# Patient Record
Sex: FEMALE | Race: WHITE | NOT HISPANIC OR LATINO | Employment: FULL TIME | URBAN - NONMETROPOLITAN AREA
[De-identification: names, ages, dates, MRNs, and addresses within clinical notes are randomized per-mention and may not be internally consistent; named-entity substitution may affect disease eponyms.]

---

## 2019-09-12 ENCOUNTER — OFFICE VISIT (OUTPATIENT)
Dept: FAMILY MEDICINE CLINIC | Facility: CLINIC | Age: 24
End: 2019-09-12
Payer: COMMERCIAL

## 2019-09-12 VITALS
HEIGHT: 64 IN | WEIGHT: 173.8 LBS | BODY MASS INDEX: 29.67 KG/M2 | DIASTOLIC BLOOD PRESSURE: 82 MMHG | SYSTOLIC BLOOD PRESSURE: 120 MMHG

## 2019-09-12 DIAGNOSIS — F41.9 ANXIETY: Primary | ICD-10-CM

## 2019-09-12 DIAGNOSIS — Z13.31 DEPRESSION SCREENING NEGATIVE: ICD-10-CM

## 2019-09-12 PROBLEM — E78.5 HYPERLIPIDEMIA: Status: ACTIVE | Noted: 2019-09-12

## 2019-09-12 PROBLEM — F33.1 MODERATE EPISODE OF RECURRENT MAJOR DEPRESSIVE DISORDER (HCC): Status: ACTIVE | Noted: 2017-01-31

## 2019-09-12 PROBLEM — E55.9 VITAMIN D DEFICIENCY: Status: ACTIVE | Noted: 2019-09-12

## 2019-09-12 PROCEDURE — 99204 OFFICE O/P NEW MOD 45 MIN: CPT | Performed by: PHYSICIAN ASSISTANT

## 2019-09-12 RX ORDER — SERTRALINE HYDROCHLORIDE 100 MG/1
100 TABLET, FILM COATED ORAL DAILY
COMMUNITY
End: 2019-09-12 | Stop reason: SDUPTHER

## 2019-09-12 RX ORDER — NITROFURANTOIN 25; 75 MG/1; MG/1
100 CAPSULE ORAL AS NEEDED
COMMUNITY
End: 2019-10-15 | Stop reason: ALTCHOICE

## 2019-09-12 RX ORDER — SERTRALINE HYDROCHLORIDE 100 MG/1
100 TABLET, FILM COATED ORAL DAILY
Qty: 90 TABLET | Refills: 0 | Status: SHIPPED | OUTPATIENT
Start: 2019-09-12 | End: 2020-01-07 | Stop reason: SDUPTHER

## 2019-09-12 RX ORDER — PROPRANOLOL HYDROCHLORIDE 10 MG/1
10 TABLET ORAL DAILY PRN
COMMUNITY
End: 2019-09-12 | Stop reason: SDUPTHER

## 2019-09-12 RX ORDER — PROPRANOLOL HYDROCHLORIDE 10 MG/1
TABLET ORAL
Qty: 90 TABLET | Refills: 0 | Status: SHIPPED | OUTPATIENT
Start: 2019-09-12 | End: 2020-08-31

## 2019-09-12 NOTE — PROGRESS NOTES
Assessment/Plan:    Problem List Items Addressed This Visit        Other    Anxiety - Primary    Relevant Medications    propranolol (INDERAL) 10 mg tablet    sertraline (ZOLOFT) 100 mg tablet      Other Visit Diagnoses     Depression screening negative        BMI 29 0-29 9,adult               Diagnoses and all orders for this visit:    Anxiety  -     propranolol (INDERAL) 10 mg tablet; Take 10 mg by mouth daily PRN  -     sertraline (ZOLOFT) 100 mg tablet; Take 1 tablet (100 mg total) by mouth daily    Depression screening negative    BMI 29 0-29 9,adult    Other orders  -     Discontinue: sertraline (ZOLOFT) 100 mg tablet; Take 100 mg by mouth daily  -     Norethin-Eth Estrad-Fe Biphas (LO LOESTRIN FE PO); Take by mouth  -     Discontinue: propranolol (INDERAL) 10 mg tablet; Take 10 mg by mouth daily as needed  -     nitrofurantoin (MACROBID) 100 mg capsule; Take 100 mg by mouth as needed              Subjective:      Patient ID: Solo Valladares is a 30 y o  female  Kavya is a new patient to our office  She is here to get established and to have medication refilled  She is doing well and does not have any new complaints/problems  The following portions of the patient's history were reviewed and updated as appropriate:   She has no past medical history on file  ,  does not have any pertinent problems on file  ,   has a past surgical history that includes Appendectomy  ,  family history includes No Known Problems in her mother  ,   reports that she has never smoked  She has never used smokeless tobacco  She reports that she drinks alcohol  Her drug history is not on file  ,  is allergic to lac bovis and pollen extract     Current Outpatient Medications   Medication Sig Dispense Refill    nitrofurantoin (MACROBID) 100 mg capsule Take 100 mg by mouth as needed      Norethin-Eth Estrad-Fe Biphas (LO LOESTRIN FE PO) Take by mouth      propranolol (INDERAL) 10 mg tablet Take 10 mg by mouth daily PRN 90 tablet 0    sertraline (ZOLOFT) 100 mg tablet Take 1 tablet (100 mg total) by mouth daily 90 tablet 0     No current facility-administered medications for this visit  Review of Systems   Constitutional: Negative for activity change, appetite change, chills, diaphoresis, fatigue, fever and unexpected weight change  HENT: Negative for congestion, ear pain, postnasal drip, rhinorrhea, sinus pressure, sinus pain, sneezing, sore throat, tinnitus and voice change  Eyes: Negative for pain, redness and visual disturbance  Respiratory: Negative for cough, chest tightness, shortness of breath and wheezing  Cardiovascular: Negative for chest pain, palpitations and leg swelling  Gastrointestinal: Negative for abdominal pain, blood in stool, constipation, diarrhea, nausea and vomiting  Genitourinary: Negative for difficulty urinating, dysuria, frequency, hematuria and urgency  Musculoskeletal: Negative for arthralgias, back pain, gait problem, joint swelling, myalgias, neck pain and neck stiffness  Skin: Negative for color change, pallor, rash and wound  Neurological: Negative for dizziness, tremors, weakness, light-headedness and headaches  Psychiatric/Behavioral: Negative for dysphoric mood, self-injury, sleep disturbance and suicidal ideas  The patient is not nervous/anxious  Objective:  Vitals:    09/12/19 1259   BP: 120/82   BP Location: Left arm   Patient Position: Sitting   Weight: 78 8 kg (173 lb 12 8 oz)   Height: 5' 4" (1 626 m)     Body mass index is 29 83 kg/m²  Physical Exam   Constitutional: She is oriented to person, place, and time  She appears well-developed and well-nourished  No distress  HENT:   Head: Normocephalic and atraumatic     Right Ear: Hearing, tympanic membrane, external ear and ear canal normal    Left Ear: Hearing, tympanic membrane, external ear and ear canal normal    Mouth/Throat: Uvula is midline, oropharynx is clear and moist and mucous membranes are normal  No oropharyngeal exudate  Eyes: Conjunctivae are normal  Right eye exhibits no discharge  Left eye exhibits no discharge  No scleral icterus  Neck: Neck supple  Carotid bruit is not present  No thyromegaly present  Cardiovascular: Normal rate, regular rhythm and normal heart sounds  No murmur heard  Pulmonary/Chest: Effort normal and breath sounds normal  No respiratory distress  She has no wheezes  Abdominal: Soft  Bowel sounds are normal  She exhibits no distension and no mass  There is no tenderness  There is no rebound and no guarding  Musculoskeletal: Normal range of motion  She exhibits no edema or tenderness  Lymphadenopathy:     She has no cervical adenopathy  Neurological: She is alert and oriented to person, place, and time  Skin: Skin is warm and dry  No rash noted  She is not diaphoretic  No erythema  Psychiatric: She has a normal mood and affect  Her behavior is normal  Judgment and thought content normal    Vitals reviewed  PHQ-9 Depression Screening    PHQ-9:    Frequency of the following problems over the past two weeks:       Little interest or pleasure in doing things:  1 - several days  Feeling down, depressed, or hopeless:  1 - several days  PHQ-2 Score:  2       BMI Counseling: Body mass index is 29 83 kg/m²  The BMI is above normal  Nutrition recommendations include reducing portion sizes  Exercise recommendations include exercising 3-5 times per week

## 2019-10-08 ENCOUNTER — TELEPHONE (OUTPATIENT)
Dept: FAMILY MEDICINE CLINIC | Facility: CLINIC | Age: 24
End: 2019-10-08

## 2019-10-08 DIAGNOSIS — Z12.4 SCREENING FOR CERVICAL CANCER: Primary | ICD-10-CM

## 2019-10-08 NOTE — TELEPHONE ENCOUNTER
Called requesting a referral for OBGyn for Yearly visit - Referral was put in    Pt has [employer] & will call back for ins referral when appt is set up and speak to someone at  about putting in referral    Pt will also be working on getting an eye appt for as well

## 2019-10-15 ENCOUNTER — OFFICE VISIT (OUTPATIENT)
Dept: FAMILY MEDICINE CLINIC | Facility: CLINIC | Age: 24
End: 2019-10-15
Payer: COMMERCIAL

## 2019-10-15 VITALS
TEMPERATURE: 97.8 F | BODY MASS INDEX: 30.05 KG/M2 | SYSTOLIC BLOOD PRESSURE: 112 MMHG | DIASTOLIC BLOOD PRESSURE: 70 MMHG | WEIGHT: 176 LBS | HEIGHT: 64 IN

## 2019-10-15 DIAGNOSIS — M54.50 ACUTE RIGHT-SIDED LOW BACK PAIN WITHOUT SCIATICA: ICD-10-CM

## 2019-10-15 DIAGNOSIS — R10.9 RIGHT FLANK PAIN: Primary | ICD-10-CM

## 2019-10-15 LAB
SL AMB  POCT GLUCOSE, UA: NORMAL
SL AMB LEUKOCYTE ESTERASE,UA: NORMAL
SL AMB POCT BILIRUBIN,UA: NORMAL
SL AMB POCT BLOOD,UA: NORMAL
SL AMB POCT CLARITY,UA: CLEAR
SL AMB POCT COLOR,UA: YELLOW
SL AMB POCT KETONES,UA: NORMAL
SL AMB POCT NITRITE,UA: NORMAL
SL AMB POCT PH,UA: 6
SL AMB POCT SPECIFIC GRAVITY,UA: 1.03
SL AMB POCT URINE PROTEIN: NORMAL
SL AMB POCT UROBILINOGEN: 0.2

## 2019-10-15 PROCEDURE — 3008F BODY MASS INDEX DOCD: CPT | Performed by: PHYSICIAN ASSISTANT

## 2019-10-15 PROCEDURE — 87086 URINE CULTURE/COLONY COUNT: CPT | Performed by: PHYSICIAN ASSISTANT

## 2019-10-15 PROCEDURE — 87186 SC STD MICRODIL/AGAR DIL: CPT | Performed by: PHYSICIAN ASSISTANT

## 2019-10-15 PROCEDURE — 81002 URINALYSIS NONAUTO W/O SCOPE: CPT | Performed by: PHYSICIAN ASSISTANT

## 2019-10-15 PROCEDURE — 99213 OFFICE O/P EST LOW 20 MIN: CPT | Performed by: PHYSICIAN ASSISTANT

## 2019-10-15 RX ORDER — CIPROFLOXACIN 500 MG/1
500 TABLET, FILM COATED ORAL EVERY 12 HOURS SCHEDULED
Qty: 14 TABLET | Refills: 0 | Status: SHIPPED | OUTPATIENT
Start: 2019-10-15 | End: 2019-10-22

## 2019-10-18 LAB
BACTERIA UR CULT: ABNORMAL
BACTERIA UR CULT: ABNORMAL

## 2020-01-07 DIAGNOSIS — F41.9 ANXIETY: ICD-10-CM

## 2020-01-07 RX ORDER — SERTRALINE HYDROCHLORIDE 100 MG/1
100 TABLET, FILM COATED ORAL DAILY
Qty: 90 TABLET | Refills: 0 | Status: SHIPPED | OUTPATIENT
Start: 2020-01-07 | End: 2020-03-31

## 2020-01-07 RX ORDER — SERTRALINE HYDROCHLORIDE 100 MG/1
100 TABLET, FILM COATED ORAL DAILY
Qty: 90 TABLET | Refills: 0 | Status: CANCELLED | OUTPATIENT
Start: 2020-01-07

## 2020-03-10 ENCOUNTER — OFFICE VISIT (OUTPATIENT)
Dept: URGENT CARE | Facility: CLINIC | Age: 25
End: 2020-03-10
Payer: COMMERCIAL

## 2020-03-10 VITALS
SYSTOLIC BLOOD PRESSURE: 123 MMHG | RESPIRATION RATE: 20 BRPM | DIASTOLIC BLOOD PRESSURE: 79 MMHG | HEIGHT: 64 IN | OXYGEN SATURATION: 98 % | HEART RATE: 74 BPM | BODY MASS INDEX: 30.56 KG/M2 | WEIGHT: 179 LBS | TEMPERATURE: 98.6 F

## 2020-03-10 DIAGNOSIS — A09 TRAVELER'S DIARRHEA: Primary | ICD-10-CM

## 2020-03-10 PROCEDURE — G0382 LEV 3 HOSP TYPE B ED VISIT: HCPCS | Performed by: PHYSICIAN ASSISTANT

## 2020-03-10 RX ORDER — CIPROFLOXACIN 500 MG/1
500 TABLET, FILM COATED ORAL EVERY 12 HOURS SCHEDULED
Qty: 6 TABLET | Refills: 0 | Status: SHIPPED | OUTPATIENT
Start: 2020-03-10 | End: 2020-03-13

## 2020-03-10 NOTE — PROGRESS NOTES
[facility] [address] [facility]  ([facility] [phone]  (fax) [phone]        NAME: Katiana Sanchez is a 30 y o  female  : 1995    MRN: [id number]  DATE: March 10, 2020  TIME: 4:11 PM    Assessment and Plan   Traveler's diarrhea [A09]  1  Traveler's diarrhea  ciprofloxacin (CIPRO) 500 mg tablet       Patient Instructions   Cipro as prescribed  The antibiotic you have been prescribed can affect the tendons  Patient warned of the risk of tendon rupture due to the antibiotic  Patient is to limit physical activity for 3-4 weeks after finishing the antibiotic  I have prescribed an antibiotic for the infection  Please take the antibiotic as prescribed and finish the entire prescription  I recommend that the patient takes an over the counter probiotic or eats yogurt with live cultures in it [place]) to keep good bacteria in the gut and help prevent diarrhea  If not improving over the next 3-5 days, follow up with PCP  Patient did verbalize understanding  To present to the ER if symptoms worsen  Chief Complaint     Chief Complaint   Patient presents with    Diarrhea     x 3 days Just got home from [address] [place] [address] ,[address] Unit Like Symptoms    Sore Throat         History of Present Illness   Katiana Sanchez presents to the clinic c/o  NO fevers or SOB  Diarrhea    This is a new problem  The current episode started in the past 7 days  The problem occurs 2 to 4 times per day  The problem has been unchanged  The stool consistency is described as watery  Associated symptoms include bloating  Pertinent negatives include no abdominal pain, arthralgias, chills, coughing, fever, headaches, increased  flatus, myalgias or vomiting  Nothing aggravates the symptoms  Risk factors include travel to endemic area ([facility])  Treatments tried: peptobismol  The treatment provided no relief         Review of Systems   Review of Systems   Constitutional: Negative for activity change, appetite change, chills, diaphoresis, fatigue and fever  HENT: Positive for sore throat  Negative for congestion, ear discharge, ear pain, facial swelling, rhinorrhea, sinus pressure, sinus pain and sneezing  Eyes: Negative for photophobia, pain, discharge, redness, itching and visual disturbance  Respiratory: Negative for apnea, cough, chest tightness, shortness of breath and wheezing  Cardiovascular: Negative for chest pain  Gastrointestinal: Positive for bloating and diarrhea  Negative for abdominal distention, abdominal pain, constipation, flatus, nausea and vomiting  Genitourinary: Negative for dysuria, flank pain, frequency, hematuria and urgency  Musculoskeletal: Negative for arthralgias, back pain, gait problem, joint swelling, myalgias, neck pain and neck stiffness  Skin: Negative for color change, rash and wound  Allergic/Immunologic: Negative for immunocompromised state  Neurological: Negative for dizziness and headaches  Hematological: Negative for adenopathy  Psychiatric/Behavioral: Negative for confusion  Current Medications     Long-Term Medications   Medication Sig Dispense Refill    propranolol (INDERAL) 10 mg tablet Take 10 mg by mouth daily PRN 90 tablet 0    sertraline (ZOLOFT) 100 mg tablet Take 1 tablet (100 mg total) by mouth daily 90 tablet 0       Current Allergies     Allergies as of 03/10/2020 - Reviewed 03/10/2020   Allergen Reaction Noted    Lac bovis GI Intolerance 11/04/2015    Pollen extract Other (See Comments) 06/01/2016            The following portions of the patient's history were reviewed and updated as appropriate: allergies, current medications, past family history, past medical history, past social history, past surgical history and problem list   History reviewed  No pertinent past medical history    Past Surgical History:   Procedure Laterality Date    APPENDECTOMY       Social History     Socioeconomic History    Marital status: /Civil Union     Spouse name: Not on file    Number of children: Not on file    Years of education: Not on file    Highest education level: Not on file   Occupational History    Not on file   Social Needs    Financial resource strain: Not on file    Food insecurity:     Worry: Not on file     Inability: Not on file    Transportation needs:     Medical: Not on file     Non-medical: Not on file   Tobacco Use    Smoking status: Never Smoker    Smokeless tobacco: Never Used   Substance and Sexual Activity    Alcohol use: Yes     Frequency: Monthly or less    Drug use: Never    Sexual activity: Not on file   Lifestyle    Physical activity:     Days per week: Not on file     Minutes per session: Not on file    Stress: Not on file   Relationships    Social connections:     Talks on phone: Not on file     Gets together: Not on file     Attends Catholic service: Not on file     Active member of club or organization: Not on file     Attends meetings of clubs or organizations: Not on file     Relationship status: Not on file    Intimate partner violence:     Fear of current or ex partner: Not on file     Emotionally abused: Not on file     Physically abused: Not on file     Forced sexual activity: Not on file   Other Topics Concern    Not on file   Social History Narrative    Not on file       Objective   /79 (BP Location: Right arm, Patient Position: Sitting, Cuff Size: Standard)   Pulse 74   Temp 98 6 °F (37 °C) (Tympanic)   Resp 20   Ht 5' 4" (1 626 m)   Wt 81 2 kg (179 lb)   LMP 02/18/2020 (Approximate)   SpO2 98%   BMI 30 73 kg/m²      Physical Exam     Physical Exam   Constitutional: She is oriented to person, place, and time  She appears well-developed and well-nourished  No distress  HENT:   Head: Normocephalic and atraumatic     Right Ear: Tympanic membrane and external ear normal    Left Ear: Tympanic membrane and external ear normal    Nose: Nose normal    Mouth/Throat: Oropharynx is clear and moist  No oropharyngeal exudate  Eyes: Pupils are equal, round, and reactive to light  Conjunctivae and EOM are normal  Right eye exhibits no discharge  Left eye exhibits no discharge  No scleral icterus  Neck: Normal range of motion  Neck supple  No JVD present  No tracheal deviation present  No thyromegaly present  Cardiovascular: Normal rate, regular rhythm and normal heart sounds  Exam reveals no gallop and no friction rub  No murmur heard  Pulmonary/Chest: Effort normal and breath sounds normal  No stridor  No respiratory distress  She has no decreased breath sounds  She has no wheezes  She has no rhonchi  She has no rales  She exhibits no tenderness  Abdominal: Soft  Normal appearance  Bowel sounds are increased  There is no hepatosplenomegaly  There is no tenderness  There is no rigidity, no rebound, no guarding, no CVA tenderness, no tenderness at McBurney's point and negative Ruiz's sign  Musculoskeletal: Normal range of motion  She exhibits no tenderness or deformity  Lymphadenopathy:     She has no cervical adenopathy  Neurological: She is alert and oriented to person, place, and time  Coordination normal    Skin: Skin is warm and dry  No rash noted  She is not diaphoretic  No erythema  No pallor  Psychiatric: She has a normal mood and affect  Her behavior is normal  Judgment and thought content normal    Nursing note and vitals reviewed        Marco Cullen PA-C

## 2020-03-18 ENCOUNTER — OFFICE VISIT (OUTPATIENT)
Dept: OBGYN CLINIC | Facility: MEDICAL CENTER | Age: 25
End: 2020-03-18
Payer: COMMERCIAL

## 2020-03-18 VITALS
SYSTOLIC BLOOD PRESSURE: 122 MMHG | DIASTOLIC BLOOD PRESSURE: 60 MMHG | HEIGHT: 64 IN | BODY MASS INDEX: 30.39 KG/M2 | WEIGHT: 178 LBS

## 2020-03-18 DIAGNOSIS — Z01.419 ENCNTR FOR GYN EXAM (GENERAL) (ROUTINE) W/O ABN FINDINGS: Primary | ICD-10-CM

## 2020-03-18 PROCEDURE — 99385 PREV VISIT NEW AGE 18-39: CPT | Performed by: NURSE PRACTITIONER

## 2020-03-18 NOTE — PROGRESS NOTES
ASSESSMENT & PLAN: Ethan Huston is a 30 y o  [place] with normal gynecologic exam     1   Routine well woman exam done today  2  Pap:  The patient's last pap was 2018  It was normal     Pap was  Not done today  Current ASCCP Guidelines reviewed  3   STD testing  was not done   4  Gardasil recommendations reviewed   5  The following were reviewed in today's visit: breast self exam, adequate intake of calcium and vitamin D, exercise, healthy diet and preconceptual counseling       Given  6  rto yearly gyn exam or with + upt    CC:  Annual Gynecologic Examination    HPI: Ethan Huston is a 30 y o  [place] who presents for annual gynecologic examination  She has the following concerns: about her meds with pregnancy, should d/c propanolol with + upt, can stay on zoloft   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, states needs to train someone so she can stop doing it when pregnant  Is discussing concerns  with manager  Health Maintenance:    She wears her seatbelt routinely  She does perform regular monthly self breast exams  She feels safe at home  History reviewed  No pertinent past medical history  Past Surgical History:   Procedure Laterality Date    APPENDECTOMY         OB/Gyn History:    Pt does not have menstrual issues  History of sexually transmitted infection: No   History of abnormal pap smears: No      Patient is currently sexually active  The current method of family planning is none      OB History        0    Para   0    Term   0       0    AB   0    Living   0       SAB   0    TAB   0    Ectopic   0    Multiple   0    Live Births   0                 Family History   Problem Relation Age of Onset    No Known Problems Mother        Social History:  Social History     Socioeconomic History    Marital status: /Civil Union     Spouse name: Not on file    Number of children: Not on file    Years of education: Not on file    Highest education level: Not on file Occupational History    Not on file   Social Needs    Financial resource strain: Not on file    Food insecurity:     Worry: Not on file     Inability: Not on file    Transportation needs:     Medical: Not on file     Non-medical: Not on file   Tobacco Use    Smoking status: Never Smoker    Smokeless tobacco: Never Used   Substance and Sexual Activity    Alcohol use: Yes     Frequency: Monthly or less    Drug use: Never    Sexual activity: Yes     Partners: Male     Birth control/protection: None   Lifestyle    Physical activity:     Days per week: Not on file     Minutes per session: Not on file    Stress: Not on file   Relationships    Social connections:     Talks on phone: Not on file     Gets together: Not on file     Attends Denominational service: Not on file     Active member of club or organization: Not on file     Attends meetings of clubs or organizations: Not on file     Relationship status: Not on file    Intimate partner violence:     Fear of current or ex partner: Not on file     Emotionally abused: Not on file     Physically abused: Not on file     Forced sexual activity: Not on file   Other Topics Concern    Not on file   Social History Narrative    Not on file     Patient is   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[place]  [employer]    Allergies   Allergen Reactions    Lac Bovis GI Intolerance    Pollen Extract Other (See Comments)     Post nasal drip         Current Outpatient Medications:     propranolol (INDERAL) 10 mg tablet, Take 10 mg by mouth daily PRN, Disp: 90 tablet, Rfl: 0    sertraline (ZOLOFT) 100 mg tablet, Take 1 tablet (100 mg total) by mouth daily, Disp: 90 tablet, Rfl: 0    Review of Systems:  Constitutional :no fever, feels well, no tiredness, no recent weight gain or loss  ENT: no ear ache, no loss of hearing, no nosebleeds or nasal discharge, no sore throat or hoarseness    Cardiovascular: no complaints of slow or fast heart beat, no chest pain, no palpitations, no leg claudication or lower extremity edema  Respiratory: no complaints of shortness of shortness of breath, no NERI  Breasts:no complaints of breast pain, breast lump, or nipple discharge  Gastrointestinal: no complaints of abdominal pain, constipation, nausea, vomiting, or diarrhea or bloody stools  Genitourinary : no complaints of dysuria, incontinence, pelvic pain, no dysmenorrhea, vaginal discharge or abnormal vaginal bleeding and as noted in HPI  Musculoskeletal: no complaints of arthralgia, no myalgia, no joint swelling or stiffness, no limb pain or swelling  Integumentary: no complaints of skin rash or lesion, itching or dry skin  Neurological: no complaints of headache, no confusion, no numbness or tingling, no dizziness or fainting    Objective      /60   Ht 5' 4" (1 626 m)   Wt 80 7 kg (178 lb)   LMP 02/24/2020   BMI 30 55 kg/m²     General:   appears stated age, cooperative, alert normal mood and affect   Neck: normal, supple,trachea midline, no masses   Heart: regular rate and rhythm, S1, S2 normal, no murmur, click, rub or gallop   Lungs: clear to auscultation bilaterally   Breasts: normal appearance, no masses or tenderness   Abdomen: soft, non-tender, without masses or organomegaly   Vulva: normal   Vagina: normal vagina   Urethra: normal   Cervix: Normal, no discharge  Uterus: normal size, contour, position, consistency, mobility, non-tender   Adnexa: no mass, fullness, tenderness   Lymphatic palpation of lymph nodes in neck, axilla, groin and/or other locations: no lymphadenopathy or masses noted   Skin normal skin turgor and no rashes     Psychiatric orientation to person, place, and time: normal  mood and affect: normal

## 2020-03-31 DIAGNOSIS — F41.9 ANXIETY: ICD-10-CM

## 2020-03-31 RX ORDER — SERTRALINE HYDROCHLORIDE 100 MG/1
TABLET, FILM COATED ORAL
Qty: 90 TABLET | Refills: 0 | Status: SHIPPED | OUTPATIENT
Start: 2020-03-31 | End: 2020-07-16

## 2020-07-15 DIAGNOSIS — F41.9 ANXIETY: ICD-10-CM

## 2020-07-16 RX ORDER — SERTRALINE HYDROCHLORIDE 100 MG/1
TABLET, FILM COATED ORAL
Qty: 90 TABLET | Refills: 0 | Status: SHIPPED | OUTPATIENT
Start: 2020-07-16 | End: 2020-12-01 | Stop reason: SDUPTHER

## 2020-08-17 ENCOUNTER — OFFICE VISIT (OUTPATIENT)
Dept: OBGYN CLINIC | Facility: MEDICAL CENTER | Age: 25
End: 2020-08-17
Payer: COMMERCIAL

## 2020-08-17 VITALS
SYSTOLIC BLOOD PRESSURE: 110 MMHG | TEMPERATURE: 99 F | HEIGHT: 64 IN | DIASTOLIC BLOOD PRESSURE: 80 MMHG | WEIGHT: 176 LBS | BODY MASS INDEX: 30.05 KG/M2

## 2020-08-17 DIAGNOSIS — N91.2 AMENORRHEA: Primary | ICD-10-CM

## 2020-08-17 LAB — SL AMB POCT URINE HCG: NEGATIVE

## 2020-08-17 PROCEDURE — 76801 OB US < 14 WKS SINGLE FETUS: CPT | Performed by: OBSTETRICS & GYNECOLOGY

## 2020-08-17 PROCEDURE — 1036F TOBACCO NON-USER: CPT | Performed by: OBSTETRICS & GYNECOLOGY

## 2020-08-17 PROCEDURE — 81025 URINE PREGNANCY TEST: CPT | Performed by: OBSTETRICS & GYNECOLOGY

## 2020-08-17 PROCEDURE — 99214 OFFICE O/P EST MOD 30 MIN: CPT | Performed by: OBSTETRICS & GYNECOLOGY

## 2020-08-17 PROCEDURE — 3008F BODY MASS INDEX DOCD: CPT | Performed by: OBSTETRICS & GYNECOLOGY

## 2020-08-18 NOTE — PROGRESS NOTES
Assessment Sebastian Renee was seen today for amenorrhea  Diagnoses and all orders for this visit:    Amenorrhea  -     POCT urine HCG  -     AMB US OB < 14 weeks single or first gestation level 1    US confirms IUP - see report   Will return for OB intake and prenatal care  PNV encouraged     Subjective   Sheldon Rocha is a 30 y o  female here for a problem visit  Patient is complaining of no menses , LMP 6/17/2020 states had positive pregnancy test  States feeling well   Denies nausea or vomiting  Denies fevers    Patient Active Problem List   Diagnosis    Hyperlipidemia    Moderate episode of recurrent major depressive disorder ([facility] [address] )    Vitamin D deficiency    Anxiety       Gynecologic History  Patient's last menstrual period was 06/17/2020 (exact date)  The current method of family planning is none  History reviewed  No pertinent past medical history    Past Surgical History:   Procedure Laterality Date    APPENDECTOMY       Family History   Problem Relation Age of Onset    No Known Problems Mother      Social History     Socioeconomic History    Marital status: /Civil Union     Spouse name: Not on file    Number of children: Not on file    Years of education: Not on file    Highest education level: Not on file   Occupational History    Not on file   Social Needs    Financial resource strain: Not on file    Food insecurity     Worry: Not on file     Inability: Not on file   [employer] needs     Medical: Not on file     Non-medical: Not on file   Tobacco Use    Smoking status: Never Smoker    Smokeless tobacco: Never Used   Substance and Sexual Activity    Alcohol use: Yes     Frequency: Monthly or less    Drug use: Never    Sexual activity: Yes     Partners: Male     Birth control/protection: None   Lifestyle    Physical activity     Days per week: Not on file     Minutes per session: Not on file    Stress: Not on file   Relationships    Social connections     Talks on phone: Not on file     Gets together: Not on file     Attends Muslim service: Not on file     Active member of club or organization: Not on file     Attends meetings of clubs or organizations: Not on file     Relationship status: Not on file    Intimate partner violence     Fear of current or ex partner: Not on file     Emotionally abused: Not on file     Physically abused: Not on file     Forced sexual activity: Not on file   Other Topics Concern    Not on file   Social History Narrative    Not on file     Allergies   Allergen Reactions    Lac Bovis GI Intolerance    Pollen Extract Other (See Comments)     Post nasal drip       Current Outpatient Medications:     Prenatal Vit-Fe Fumarate-FA (PRENATAL PO), Take by mouth, Disp: , Rfl:     sertraline (ZOLOFT) 100 mg tablet, take 1 tablet by mouth once daily, Disp: 90 tablet, Rfl: 0    propranolol (INDERAL) 10 mg tablet, Take 10 mg by mouth daily PRN (Patient not taking: Reported on 8/17/2020), Disp: 90 tablet, Rfl: 0    Review of Systems  Constitutional :no fever, feels well, no tiredness, no recent weight gain or loss  ENT: no ear ache, no loss of hearing, no nosebleeds or nasal discharge, no sore throat or hoarseness  Cardiovascular: no complaints of slow or fast heart beat, no chest pain, no palpitations, no leg claudication or lower extremity edema  Respiratory: no complaints of shortness of shortness of breath, no NERI  Breasts:no complaints of breast pain, breast lump, or nipple discharge  Gastrointestinal: no complaints of abdominal pain, constipation, nausea, vomiting, or diarrhea or bloody stools  Genitourinary : no complaints of dysuria, incontinence, pelvic pain, no dysmenorrhea, vaginal discharge or abnormal vaginal bleeding and as noted in HPI  Musculoskeletal: no complaints of arthralgia, no myalgia, no joint swelling or stiffness, no limb pain or swelling    Integumentary: no complaints of skin rash or lesion, itching or dry skin  Neurological: no complaints of headache, no confusion, no numbness or tingling, no dizziness or fainting     Objective     /80   Temp 99 °F (37 2 °C)   Ht 5' 4" (1 626 m)   Wt 79 8 kg (176 lb)   LMP 06/17/2020 (Exact Date)   BMI 30 21 kg/m²     General:   appears stated age, cooperative, alert normal mood and affect   Lungs: Unlabored breathing    Abdomen: soft, non-tender, without masses or organomegaly   Vulva: normal   Vagina: normal vagina, no discharge, exudate, lesion, or erythema   Urethra: normal   Cervix: Normal, no discharge  Nontender     Uterus: normal size, contour, position, consistency, mobility, non-tender   Adnexa: no mass, fullness, tenderness   Psychiatric orientation to person, place, and time: normal  mood and affect: normal

## 2020-08-24 DIAGNOSIS — O21.9 NAUSEA AND VOMITING IN PREGNANCY: Primary | ICD-10-CM

## 2020-08-24 RX ORDER — DOXYLAMINE SUCCINATE AND PYRIDOXINE HYDROCHLORIDE, DELAYED RELEASE TABLETS 10 MG/10 MG 10; 10 MG/1; MG/1
TABLET, DELAYED RELEASE ORAL
Qty: 60 TABLET | Refills: 1 | Status: SHIPPED | OUTPATIENT
Start: 2020-08-24 | End: 2020-11-16 | Stop reason: ALTCHOICE

## 2020-08-27 NOTE — PATIENT INSTRUCTIONS
Pregnancy at 7 to [address]:   What changes are happening to your body:  Pregnancy hormones may cause your body to go through many changes during this stage of your pregnancy  You may feel more tired than usual, and have mood swings, nausea and vomiting, and headaches  Your breasts may feel tender and swollen and you may urinate more frequently  Seek care immediately if:   · You have pain or cramping in your abdomen or low back  · You have heavy vaginal bleeding or clotting  · You pass material that looks like tissue or large clots  Collect the material and bring it with you  Contact your healthcare provider if:   · You have light bleeding  · You have chills or a fever  · You have vaginal itching, burning, or pain  · You have yellow, green, white, or foul-smelling vaginal discharge  · You have pain or burning when you urinate, less urine than usual, or pink or bloody urine  · You have questions or concerns about your condition or care  How to care for yourself at this stage of your pregnancy:   · Manage nausea and vomiting  Avoid fatty and spicy foods  Eat small meals throughout the day instead of large meals  Kristina may help to decrease nausea  Ask your healthcare provider about other ways of decreasing nausea and vomiting  · Eat a variety of healthy foods  Healthy foods include fruits, vegetables, whole-grain breads, low-fat dairy foods, beans, lean meats, and fish  Drink liquids as directed  Ask how much liquid to drink each day and which liquids are best for you  Limit caffeine to less than 200 milligrams each day  Limit your intake of fish to 2 servings each week  Choose fish low in mercury such as canned light tuna, shrimp, salmon, cod, or tilapia  Do not  eat fish high in mercury such as swordfish, tilefish, mey mackerel, and shark  · Take prenatal vitamins as directed    Your need for certain vitamins and minerals, such as folic acid, increases during pregnancy  Prenatal vitamins provide some of the extra vitamins and minerals you need  Prenatal vitamins may also help to decrease the risk of certain birth defects  · Ask how much weight you should gain each month  Too much or too little weight gain can be unhealthy for you and your baby  · Do not smoke  If you smoke, it is never too late to quit  Smoking increases your risk of a miscarriage and other health problems during your pregnancy  Smoking can cause your baby to be born too early or weigh less at birth  Ask your healthcare provider for information if you need help quitting  · Do not drink alcohol  Alcohol passes from your body to your baby through the placenta  It can affect your baby's brain development and cause fetal alcohol syndrome (FAS)  FAS is a group of conditions that causes mental, behavior, and growth problems  · Talk to your healthcare provider before you take any medicines  Many medicines may harm your baby if you take them when you are pregnant  Do not take any medicines, vitamins, herbs, or supplements without first talking to your healthcare provider  Never use illegal or street drugs (such as marijuana or cocaine) while you are pregnant  Safety tips during pregnancy:   · Avoid hot tubs and saunas  Do not use a hot tub or sauna while you are pregnant, especially during your first trimester  Hot tubs and saunas may raise your baby's temperature and increase the risk of birth defects  · Avoid toxoplasmosis  This is an infection caused by eating raw meat or being around infected cat feces  It can cause birth defects, miscarriages, and other problems  Wash your hands after you touch raw meat  Make sure any meat is well-cooked before you eat it  Avoid raw eggs and unpasteurized milk  Use gloves or ask someone else to clean your cat's litter box while you are pregnant    Changes that are happening with your baby:  By 10 weeks, your baby will be about 2 ½ inches long from the top of the head to the rump (baby's bottom)  Your baby weighs about ½ ounce  Major body organs, such as the brain, heart, and lungs, are forming  Your baby's facial features are also starting to form  What you need to know about prenatal care:  Prenatal care is a series of visits with your healthcare provider throughout your pregnancy  During the first 28 weeks of your pregnancy, you will see your healthcare provider once a month  Prenatal care can help prevent problems during pregnancy and childbirth  Your healthcare provider will ask questions about your health and any previous pregnancies you have had  He will also ask about any medicines you are taking  You may also need any of the following:  · A pap smear  will be done to check your cervix for abnormal cells  The cervix is the narrow opening at the bottom of your uterus  The cervix meets the top part of the vagina  · A pelvic exam  allows your healthcare provider to see your cervix (the bottom part of your uterus)  Your healthcare provider uses a speculum to gently open your vagina  He will check the size and shape of your uterus  · Blood tests  may be done to check for anemia or blood type  Your healthcare provider may also order other blood tests to check if you are immune to certain diseases such as Hepatitis B  He may also recommend an HIV test     · Urine tests  may also be done to check for signs of infection  · Your blood pressure and weight  will be checked  © 2017 [address] [employer] Information is for End User's use only and may not be sold, redistributed or otherwise used for commercial purposes  All illustrations and images included in CareNotes® are the copyrighted property of A D [employer] M [employer]  or Nehemias Jaime  The above information is an  only  It is not intended as medical advice for individual conditions or treatments   Talk to your doctor, nurse or pharmacist before following any medical regimen to see if it is safe and effective for you  Pregnancy at 11 to 14 Weeks   AMBULATORY CARE:   What changes are happening to your body: You are now at the end of your first trimester and entering your second trimester  Morning sickness usually goes away by this time  You may have other symptoms such as fatigue, frequent urination, and headaches  You may have gained between 2 to 4 pounds by now  Seek care immediately if:   · You have pain or cramping in your abdomen or low back  · You have heavy vaginal bleeding or clotting  · You pass material that looks like tissue or large clots  Collect the material and bring it with you  Contact your healthcare provider if:   · You cannot keep food or drinks down, and you are losing weight  · You have light bleeding  · You have chills or a fever  · You have vaginal itching, burning, or pain  · You have yellow, green, white, or foul-smelling vaginal discharge  · You have pain or burning when you urinate, less urine than usual, or pink or bloody urine  · You have questions or concerns about your condition or care  How to care for yourself at this stage of your pregnancy:   · Get plenty of rest   You may feel more tired than normal  You may need to take naps or go to bed earlier  · Manage nausea and vomiting  Avoid fatty and spicy foods  Eat small meals throughout the day instead of large meals  Kristina may help to decrease nausea  Ask your healthcare provider about other ways of decreasing nausea and vomiting  · Eat a variety of healthy foods  Healthy foods include fruits, vegetables, whole-grain breads, low-fat dairy foods, beans, lean meats, and fish  Drink liquids as directed  Ask how much liquid to drink each day and which liquids are best for you  Limit caffeine to less than 200 milligrams each day  Limit your intake of fish to 2 servings each week  Choose fish low in mercury such as canned light tuna, shrimp, salmon, cod, or tilapia   Do not  eat fish high in mercury such as swordfish, tilefish, mey mackerel, and shark  · Take prenatal vitamins as directed  Your need for certain vitamins and minerals, such as folic acid, increases during pregnancy  Prenatal vitamins provide some of the extra vitamins and minerals you need  Prenatal vitamins may also help to decrease the risk of certain birth defects  · Do not smoke  If you smoke, it is never too late to quit  Smoking increases your risk of a miscarriage and other health problems during your pregnancy  Smoking can cause your baby to be born too early or weigh less at birth  Ask your healthcare provider for information if you need help quitting  · Do not drink alcohol  Alcohol passes from your body to your baby through the placenta  It can affect your baby's brain development and cause fetal alcohol syndrome (FAS)  FAS is a group of conditions that causes mental, behavior, and growth problems  · Talk to your healthcare provider before you take any medicines  Many medicines may harm your baby if you take them when you are pregnant  Do not take any medicines, vitamins, herbs, or supplements without first talking to your healthcare provider  Never use illegal or street drugs (such as marijuana or cocaine) while you are pregnant  Safety tips during pregnancy:   · Avoid hot tubs and saunas  Do not use a hot tub or sauna while you are pregnant, especially during your first trimester  Hot tubs and saunas may raise your baby's temperature and increase the risk of birth defects  · Avoid toxoplasmosis  This is an infection caused by eating raw meat or being around infected cat feces  It can cause birth defects, miscarriages, and other problems  Wash your hands after you touch raw meat  Make sure any meat is well-cooked before you eat it  Avoid raw eggs and unpasteurized milk  Use gloves or ask someone else to clean your cat's litter box while you are pregnant    Changes that are happening with your baby: Your baby has fully formed fingernails and toenails  Your baby's heartbeat can now be heard  Ask your healthcare provider if you can listen to your baby's heartbeat  By week 14, your baby is over 4 inches long from the top of the head to the rump (baby's bottom)  Your baby weighs over 3 ounces  What you need to know about prenatal care:  During the first 28 weeks of your pregnancy, you will see your healthcare provider once a month  Prenatal care can help prevent problems during pregnancy and childbirth  Your healthcare provider will check your blood pressure and weight  You may also need any of the following:  · A urine test  may also be done to check for sugar and protein  These can be signs of gestational diabetes or infection  · Genetic disorders screening tests  may be offered to you  This screening test checks your baby's risk of genetic disorders such as Down syndrome  The screening test includes a blood test and ultrasound  · Your baby's heart rate  will be checked  © 2017 [address]  Information is for End User's use only and may not be sold, redistributed or otherwise used for commercial purposes  All illustrations and images included in CareNotes® are the copyrighted property of A [employer] M [employer]  or [employer]hem[employer] Jaime  The above information is an  only  It is not intended as medical advice for individual conditions or treatments  Talk to your doctor, nurse or pharmacist before following any medical regimen to see if it is safe and effective for you

## 2020-08-31 ENCOUNTER — INITIAL PRENATAL (OUTPATIENT)
Dept: OBGYN CLINIC | Facility: MEDICAL CENTER | Age: 25
End: 2020-08-31

## 2020-08-31 DIAGNOSIS — Z34.91 ENCOUNTER FOR PREGNANCY RELATED EXAMINATION IN FIRST TRIMESTER: Primary | ICD-10-CM

## 2020-08-31 PROCEDURE — OBC: Performed by: OBSTETRICS & GYNECOLOGY

## 2020-08-31 NOTE — PROGRESS NOTES
OB INTAKE INTERVIEW      Pt presents for OB intake    OB History    Para Term  AB Living   1 0 0 0 0 0   SAB TAB Ectopic Multiple Live Births   0 0 0 0 0      # Outcome Date GA Lbr Anirudh/2nd Weight Sex Delivery Anes PTL Lv   1 Current                  Hx of  delivery prior to 36 weeks 6 days:  NO     Last Menstrual Period:   Patient's last menstrual period was 2020 (exact date)  Ultrasound date:   2020 8 weeks  Estimated date of delivery:   Estimated Date of Delivery: 2020  confirmed by LMP ? History of Diabetes: NO  History of Hypertension: NO      Infection Screening: Does the pt have a hx of MRSA? NO    H&P visit scheduled  ?  Interview education  Information on [facility] Pregnancy Essentials reviewed  Handouts given: Baby [facility] Me support center  [facility] Vikash Barbozaon in Pregnancy information sheet   COVID-19: precautions and travel restrictions reviewed    Interview education    [facility]  Discussed genetic testing-    - pt interested   In sequential screen  Information on CF and SMA carrier screening reviewed-will let office know at next appointment if screening is desired          Discussed Tdap and Influenza vaccines         Depression Screening Follow-up Plan: Patient's depression screening was Negative  with an Wilton score of  2                  The patient was oriented to our practice and all questions were answered    Interviewed by: Adriana Cramer RN 20

## 2020-09-08 ENCOUNTER — TELEPHONE (OUTPATIENT)
Dept: OBGYN CLINIC | Facility: MEDICAL CENTER | Age: 25
End: 2020-09-08

## 2020-09-08 NOTE — TELEPHONE ENCOUNTER
----- Message from Alka Bhatia sent at 9/8/2020  7:45 AM EDT -----  Regarding: Non-Urgent Medical Question  Contact: [phone]  Hello, yesterday I had some red spotting when going to the bathroom and today its more of a brown discharge in my underwear and when going to the bathroom  Just curious if cause for concern?

## 2020-09-09 LAB
EXTERNAL HEMATOCRIT: 38.9 %
EXTERNAL HEMOGLOBIN: 13.6 G/DL
EXTERNAL HEPATITIS B SURFACE ANTIGEN: NORMAL
EXTERNAL HIV-1/2 AB-AG: NORMAL
EXTERNAL PLATELET COUNT: 160 K/ΜL
EXTERNAL RH FACTOR: POSITIVE
EXTERNAL RUBELLA IGG QUANTITATION: NORMAL
EXTERNAL SYPHILIS RPR SCREEN: NORMAL

## 2020-09-11 DIAGNOSIS — R82.71 GBS BACTERIURIA: Primary | ICD-10-CM

## 2020-09-11 LAB
ABO GROUP BLD: ABNORMAL
APPEARANCE UR: CLEAR
BASOPHILS # BLD AUTO: 13 CELLS/UL (ref 0–200)
BASOPHILS NFR BLD AUTO: 0.2 %
BLD GP AB SCN SERPL QL: ABNORMAL
COLOR UR: YELLOW
EOSINOPHIL # BLD AUTO: 98 CELLS/UL (ref 15–500)
EOSINOPHIL NFR BLD AUTO: 1.5 %
ERYTHROCYTE [DISTWIDTH] IN BLOOD BY AUTOMATED COUNT: 12.9 % (ref 11–15)
GLUCOSE UR QL STRIP: NEGATIVE
HBV SURFACE AG SERPL QL IA: ABNORMAL
HCT VFR BLD AUTO: 38.9 % (ref 35–45)
HGB BLD-MCNC: 13.4 G/DL (ref 11.7–15.5)
HGB UR QL STRIP: NEGATIVE
HIV 1+2 AB+HIV1 P24 AG SERPL QL IA: ABNORMAL
KETONES UR QL STRIP: NEGATIVE
LYMPHOCYTES # BLD AUTO: 585 CELLS/UL (ref 850–3900)
LYMPHOCYTES NFR BLD AUTO: 9 %
MCH RBC QN AUTO: 31.3 PG (ref 27–33)
MCHC RBC AUTO-ENTMCNC: 34.4 G/DL (ref 32–36)
MCV RBC AUTO: 90.9 FL (ref 80–100)
MONOCYTES # BLD AUTO: 540 CELLS/UL (ref 200–950)
MONOCYTES NFR BLD AUTO: 8.3 %
NEUTROPHILS # BLD AUTO: 5265 CELLS/UL (ref 1500–7800)
NEUTROPHILS NFR BLD AUTO: 81 %
PH UR STRIP: 6.5 [PH] (ref 5–8)
PLATELET # BLD AUTO: 160 THOUSAND/UL (ref 140–400)
PMV BLD REES-ECKER: 11.1 FL (ref 7.5–12.5)
PROT UR QL STRIP: NEGATIVE
RBC # BLD AUTO: 4.28 MILLION/UL (ref 3.8–5.1)
RH BLD: ABNORMAL
RPR SER QL: ABNORMAL
RUBV IGG SERPL IA-ACNC: 8.26 INDEX
SP GR UR STRIP: 1 (ref 1–1.03)
WBC # BLD AUTO: 6.5 THOUSAND/UL (ref 3.8–10.8)

## 2020-09-11 RX ORDER — AMOXICILLIN 875 MG/1
875 TABLET, COATED ORAL 2 TIMES DAILY
Qty: 10 TABLET | Refills: 0 | Status: SHIPPED | OUTPATIENT
Start: 2020-09-11 | End: 2020-09-16

## 2020-09-14 ENCOUNTER — INITIAL PRENATAL (OUTPATIENT)
Dept: OBGYN CLINIC | Facility: MEDICAL CENTER | Age: 25
End: 2020-09-14

## 2020-09-14 VITALS — BODY MASS INDEX: 30.04 KG/M2 | WEIGHT: 175 LBS

## 2020-09-14 DIAGNOSIS — K59.00 CONSTIPATION IN PREGNANCY IN FIRST TRIMESTER: ICD-10-CM

## 2020-09-14 DIAGNOSIS — O21.9 NAUSEA AND VOMITING OF PREGNANCY, ANTEPARTUM: ICD-10-CM

## 2020-09-14 DIAGNOSIS — O99.211 OBESITY AFFECTING PREGNANCY IN FIRST TRIMESTER: ICD-10-CM

## 2020-09-14 DIAGNOSIS — Z34.91 ENCOUNTER FOR PREGNANCY RELATED EXAMINATION IN FIRST TRIMESTER: Primary | ICD-10-CM

## 2020-09-14 DIAGNOSIS — O99.611 CONSTIPATION IN PREGNANCY IN FIRST TRIMESTER: ICD-10-CM

## 2020-09-14 LAB
EXTERNAL CHLAMYDIA SCREEN: NORMAL
EXTERNAL GONORRHEA SCREEN: NORMAL

## 2020-09-14 PROCEDURE — PNV: Performed by: STUDENT IN AN ORGANIZED HEALTH CARE EDUCATION/TRAINING PROGRAM

## 2020-09-14 RX ORDER — VITAMIN A, VITAMIN C, VITAMIN D-3, VITAMIN E, VITAMIN B-1, VITAMIN B-2, NIACIN, VITAMIN B-6, CALCIUM, IRON, ZINC, COPPER 4000; 120; 400; 22; 1.84; 3; 20; 10; 1; 12; 200; 27; 25; 2 [IU]/1; MG/1; [IU]/1; MG/1; MG/1; MG/1; MG/1; MG/1; MG/1; UG/1; MG/1; MG/1; MG/1; MG/1
1 TABLET ORAL DAILY
Qty: 90 TABLET | Refills: 3 | Status: SHIPPED | OUTPATIENT
Start: 2020-09-14 | End: 2021-09-23

## 2020-09-14 RX ORDER — PYRIDOXINE HCL (VITAMIN B6) 50 MG
50 TABLET ORAL 3 TIMES DAILY
Qty: 270 TABLET | Refills: 0 | Status: SHIPPED | OUTPATIENT
Start: 2020-09-14 | End: 2020-11-16 | Stop reason: ALTCHOICE

## 2020-09-14 NOTE — PROGRESS NOTES
Initial Prenatal Visit  [facility] [address] [place] [address]    Assessment/Plan:  Syl Hampton is a 30y o  year old  at 12w5d who presents for initial prenatal visit  Supervision of normal pregnancy  - Prenatal labs reviewed and normal   Blood type: A positive  - Aneuploidy screening discussed  Patient declines aneuploidy screening  Ordered Level 2 US today to be completed 18-20 weeks  - Routine cervical cancer screening: Pap Up to date  - Routine STI Screening: GC/Chlamydia sent today  HIV/Hep B/Syphilis ordered in prenatal panel   - Patient Education: Patient was counseled regarding diet, exercise, weight gain, foods to avoid, vaccines in pregnancy, aneuploidy screening, travel precautions to include seat belt use and VTE risk reduction  She has been provided our pregnancy packet which includes how and when to contact providers, medication recommendations, dietary suggestions, breastfeeding information as well as websites for additional information, hospital and delivery concerns  Additional Pregnancy Problems:   1  Encounter for pregnancy related examination in first trimester  -     Chlamydia/GC amplified DNA by PCR  -     Ambulatory Referral to Maternal Fetal Medicine; Future; Expected date: 09/15/2020  -     Prenatal Vit-Fe Fumarate-FA (Prenatal Vitamin Plus Low Iron) 27-1 MG TABS; Take 1 tablet by mouth daily    2  Nausea and vomiting of pregnancy, antepartum  Assessment & Plan:  - Discussed options including Pyridoxine 50 mg q 8 hours     Orders:  -     pyridoxine (VITAMIN B6) 50 mg tablet; Take 1 tablet (50 mg total) by mouth 3 (three) times a day    3  Constipation in pregnancy in first trimester  Assessment & Plan:  - We discussed various over the counter options for management of constipation in pregnancy      4   Obesity affecting pregnancy in first trimester  Assessment & Plan:  - Only 1 risk factor (BMI 30) and no first degree relatives with diabetes so no indication for early glucola at this time  Subjective:   CC:  Desires prenatal care  Tatiana Dubois is a 30 y o   female who presents for prenatal care  Pregnancy ROS:  Reports some nausea/vomiting  Reports constipation  The following portions of the patient's history were reviewed and updated as appropriate: allergies, current medications, past family history, past medical history, obstetric history, gynecologic history, past social history, past surgical history and problem list       Objective: Wt 79 4 kg (175 lb)   LMP 2020 (Exact Date)   BMI 30 04 kg/m²   Pregravid Weight/BMI: 77 1 kg (170 lb) (BMI 29 17)  Current Weight: 79 4 kg (175 lb)   Total Weight Gain: 2 268 kg (5 lb)   Pre- Vitals      Most Recent Value   Prenatal Assessment   Fetal Heart Rate  164   Prenatal Vitals   Weight - Scale  79 4 kg (175 lb)   Urine Albumin/Glucose   Dilation/Effacement/Station   Vaginal Drainage   Edema   LLE Edema  None   RLE Edema  None         General: Well appearing, no distress  Respiratory: Normal respiratory rate, lungs clear to auscultation, no wheezing or rales  Cardiovascular: Regular rate and rhythm, no murmurs, rubs, or gallops  Breasts: Normal bilaterally, nontender without masses, asymmetry, or nipple discharge  Abdomen: Soft, gravid, nontender  : Urethra normal  Normal labia majora and minora  Vagina normal   No vaginal bleeding  No vaginal discharge  Cervix visually closed  Extremities: Warm and well perfused  Non tender  No edema      Ernestine Hampton MD  [address] [facility]  2020 3:58 PM

## 2020-09-14 NOTE — ASSESSMENT & PLAN NOTE
- Only 1 risk factor (BMI 30) and no first degree relatives with diabetes so no indication for early glucola at this time

## 2020-09-15 LAB
C TRACH RRNA SPEC QL NAA+PROBE: NOT DETECTED
N GONORRHOEA RRNA SPEC QL NAA+PROBE: NOT DETECTED

## 2020-10-12 ENCOUNTER — ROUTINE PRENATAL (OUTPATIENT)
Dept: OBGYN CLINIC | Facility: MEDICAL CENTER | Age: 25
End: 2020-10-12

## 2020-10-12 VITALS — DIASTOLIC BLOOD PRESSURE: 70 MMHG | WEIGHT: 175.3 LBS | SYSTOLIC BLOOD PRESSURE: 120 MMHG | BODY MASS INDEX: 30.09 KG/M2

## 2020-10-12 DIAGNOSIS — Z3A.16 16 WEEKS GESTATION OF PREGNANCY: ICD-10-CM

## 2020-10-12 DIAGNOSIS — R82.71 GBS BACTERIURIA: ICD-10-CM

## 2020-10-12 DIAGNOSIS — O21.9 NAUSEA AND VOMITING OF PREGNANCY, ANTEPARTUM: Primary | ICD-10-CM

## 2020-10-12 PROCEDURE — PNV: Performed by: ADVANCED PRACTICE MIDWIFE

## 2020-10-12 RX ORDER — ONDANSETRON 8 MG/1
8 TABLET, ORALLY DISINTEGRATING ORAL EVERY 8 HOURS PRN
Qty: 20 TABLET | Refills: 1 | Status: SHIPPED | OUTPATIENT
Start: 2020-10-12 | End: 2020-10-29 | Stop reason: SDUPTHER

## 2020-10-29 DIAGNOSIS — O21.9 NAUSEA AND VOMITING OF PREGNANCY, ANTEPARTUM: ICD-10-CM

## 2020-10-29 DIAGNOSIS — F41.9 ANXIETY: ICD-10-CM

## 2020-10-29 RX ORDER — ONDANSETRON 8 MG/1
8 TABLET, ORALLY DISINTEGRATING ORAL EVERY 8 HOURS PRN
Qty: 20 TABLET | Refills: 1 | Status: SHIPPED | OUTPATIENT
Start: 2020-10-29 | End: 2020-11-16 | Stop reason: ALTCHOICE

## 2020-10-29 RX ORDER — SERTRALINE HYDROCHLORIDE 100 MG/1
TABLET, FILM COATED ORAL
Qty: 90 TABLET | Refills: 0 | OUTPATIENT
Start: 2020-10-29

## 2020-11-09 ENCOUNTER — ROUTINE PRENATAL (OUTPATIENT)
Dept: OBGYN CLINIC | Facility: MEDICAL CENTER | Age: 25
End: 2020-11-09

## 2020-11-09 VITALS
SYSTOLIC BLOOD PRESSURE: 122 MMHG | BODY MASS INDEX: 30.66 KG/M2 | TEMPERATURE: 97.9 F | HEIGHT: 64 IN | WEIGHT: 179.6 LBS | DIASTOLIC BLOOD PRESSURE: 74 MMHG

## 2020-11-09 DIAGNOSIS — Z34.91 ENCOUNTER FOR PREGNANCY RELATED EXAMINATION IN FIRST TRIMESTER: Primary | ICD-10-CM

## 2020-11-09 PROCEDURE — PNV: Performed by: OBSTETRICS & GYNECOLOGY

## 2020-11-13 ENCOUNTER — TELEPHONE (OUTPATIENT)
Dept: PERINATAL CARE | Facility: CLINIC | Age: 25
End: 2020-11-13

## 2020-11-16 ENCOUNTER — ROUTINE PRENATAL (OUTPATIENT)
Dept: PERINATAL CARE | Facility: OTHER | Age: 25
End: 2020-11-16
Payer: COMMERCIAL

## 2020-11-16 VITALS
SYSTOLIC BLOOD PRESSURE: 106 MMHG | HEIGHT: 64 IN | WEIGHT: 177 LBS | TEMPERATURE: 97.7 F | HEART RATE: 89 BPM | DIASTOLIC BLOOD PRESSURE: 64 MMHG | BODY MASS INDEX: 30.22 KG/M2

## 2020-11-16 DIAGNOSIS — Z3A.22 22 WEEKS GESTATION OF PREGNANCY: ICD-10-CM

## 2020-11-16 DIAGNOSIS — Z36.86 ENCOUNTER FOR ANTENATAL SCREENING FOR CERVICAL LENGTH: ICD-10-CM

## 2020-11-16 DIAGNOSIS — O99.212 OBESITY AFFECTING PREGNANCY IN SECOND TRIMESTER: Primary | ICD-10-CM

## 2020-11-16 DIAGNOSIS — O21.9 NAUSEA AND VOMITING OF PREGNANCY, ANTEPARTUM: ICD-10-CM

## 2020-11-16 DIAGNOSIS — Z34.91 ENCOUNTER FOR PREGNANCY RELATED EXAMINATION IN FIRST TRIMESTER: ICD-10-CM

## 2020-11-16 PROCEDURE — 1036F TOBACCO NON-USER: CPT | Performed by: OBSTETRICS & GYNECOLOGY

## 2020-11-16 PROCEDURE — 76805 OB US >/= 14 WKS SNGL FETUS: CPT | Performed by: OBSTETRICS & GYNECOLOGY

## 2020-11-16 PROCEDURE — 76817 TRANSVAGINAL US OBSTETRIC: CPT | Performed by: OBSTETRICS & GYNECOLOGY

## 2020-11-16 PROCEDURE — 3008F BODY MASS INDEX DOCD: CPT | Performed by: OBSTETRICS & GYNECOLOGY

## 2020-11-16 PROCEDURE — 99213 OFFICE O/P EST LOW 20 MIN: CPT | Performed by: OBSTETRICS & GYNECOLOGY

## 2020-11-16 PROCEDURE — NC001 PR NO CHARGE: Performed by: OBSTETRICS & GYNECOLOGY

## 2020-12-01 DIAGNOSIS — F41.9 ANXIETY: ICD-10-CM

## 2020-12-01 RX ORDER — SERTRALINE HYDROCHLORIDE 100 MG/1
100 TABLET, FILM COATED ORAL DAILY
Qty: 90 TABLET | Refills: 0 | Status: SHIPPED | OUTPATIENT
Start: 2020-12-01 | End: 2021-02-23

## 2020-12-07 ENCOUNTER — ROUTINE PRENATAL (OUTPATIENT)
Dept: OBGYN CLINIC | Facility: MEDICAL CENTER | Age: 25
End: 2020-12-07

## 2020-12-07 VITALS — BODY MASS INDEX: 31.07 KG/M2 | DIASTOLIC BLOOD PRESSURE: 70 MMHG | WEIGHT: 181 LBS | SYSTOLIC BLOOD PRESSURE: 120 MMHG

## 2020-12-07 DIAGNOSIS — Z34.02 ENCOUNTER FOR SUPERVISION OF NORMAL FIRST PREGNANCY IN SECOND TRIMESTER: Primary | ICD-10-CM

## 2020-12-07 DIAGNOSIS — O21.9 NAUSEA AND VOMITING OF PREGNANCY, ANTEPARTUM: ICD-10-CM

## 2020-12-07 DIAGNOSIS — Z3A.24 24 WEEKS GESTATION OF PREGNANCY: ICD-10-CM

## 2020-12-07 PROCEDURE — PNV: Performed by: OBSTETRICS & GYNECOLOGY

## 2020-12-15 DIAGNOSIS — O21.9 NAUSEA AND VOMITING DURING PREGNANCY: Primary | ICD-10-CM

## 2020-12-15 RX ORDER — ONDANSETRON 8 MG/1
TABLET, ORALLY DISINTEGRATING ORAL
Qty: 20 TABLET | Refills: 1 | Status: SHIPPED | OUTPATIENT
Start: 2020-12-15 | End: 2021-03-05 | Stop reason: HOSPADM

## 2020-12-21 ENCOUNTER — ROUTINE PRENATAL (OUTPATIENT)
Dept: OBGYN CLINIC | Facility: MEDICAL CENTER | Age: 25
End: 2020-12-21

## 2020-12-21 VITALS
BODY MASS INDEX: 31.41 KG/M2 | WEIGHT: 184 LBS | DIASTOLIC BLOOD PRESSURE: 80 MMHG | SYSTOLIC BLOOD PRESSURE: 122 MMHG | HEIGHT: 64 IN

## 2020-12-21 DIAGNOSIS — Z34.03 ENCOUNTER FOR SUPERVISION OF NORMAL FIRST PREGNANCY IN THIRD TRIMESTER: ICD-10-CM

## 2020-12-21 DIAGNOSIS — Z3A.28 28 WEEKS GESTATION OF PREGNANCY: Primary | ICD-10-CM

## 2020-12-21 PROCEDURE — 3008F BODY MASS INDEX DOCD: CPT | Performed by: OBSTETRICS & GYNECOLOGY

## 2020-12-21 PROCEDURE — PNV: Performed by: OBSTETRICS & GYNECOLOGY

## 2020-12-22 LAB
BASOPHILS # BLD AUTO: 17 CELLS/UL (ref 0–200)
BASOPHILS NFR BLD AUTO: 0.2 %
EOSINOPHIL # BLD AUTO: 50 CELLS/UL (ref 15–500)
EOSINOPHIL NFR BLD AUTO: 0.6 %
ERYTHROCYTE [DISTWIDTH] IN BLOOD BY AUTOMATED COUNT: 13.2 % (ref 11–15)
GLUCOSE 1H P 50 G GLC PO SERPL-MCNC: 132 MG/DL
HCT VFR BLD AUTO: 36.3 % (ref 35–45)
HGB BLD-MCNC: 12.3 G/DL (ref 11.7–15.5)
LYMPHOCYTES # BLD AUTO: 730 CELLS/UL (ref 850–3900)
LYMPHOCYTES NFR BLD AUTO: 8.8 %
MCH RBC QN AUTO: 31.6 PG (ref 27–33)
MCHC RBC AUTO-ENTMCNC: 33.9 G/DL (ref 32–36)
MCV RBC AUTO: 93.3 FL (ref 80–100)
MONOCYTES # BLD AUTO: 531 CELLS/UL (ref 200–950)
MONOCYTES NFR BLD AUTO: 6.4 %
NEUTROPHILS # BLD AUTO: 6972 CELLS/UL (ref 1500–7800)
NEUTROPHILS NFR BLD AUTO: 84 %
PLATELET # BLD AUTO: 151 THOUSAND/UL (ref 140–400)
PMV BLD REES-ECKER: 11.3 FL (ref 7.5–12.5)
RBC # BLD AUTO: 3.89 MILLION/UL (ref 3.8–5.1)
WBC # BLD AUTO: 8.3 THOUSAND/UL (ref 3.8–10.8)

## 2021-01-11 ENCOUNTER — ROUTINE PRENATAL (OUTPATIENT)
Dept: OBGYN CLINIC | Facility: MEDICAL CENTER | Age: 26
End: 2021-01-11
Payer: COMMERCIAL

## 2021-01-11 VITALS — WEIGHT: 188.6 LBS | SYSTOLIC BLOOD PRESSURE: 124 MMHG | DIASTOLIC BLOOD PRESSURE: 70 MMHG | BODY MASS INDEX: 32.37 KG/M2

## 2021-01-11 DIAGNOSIS — F41.9 ANXIETY: ICD-10-CM

## 2021-01-11 DIAGNOSIS — Z23 NEED FOR TDAP VACCINATION: ICD-10-CM

## 2021-01-11 DIAGNOSIS — Z3A.29 29 WEEKS GESTATION OF PREGNANCY: Primary | ICD-10-CM

## 2021-01-11 DIAGNOSIS — R82.71 GBS BACTERIURIA: ICD-10-CM

## 2021-01-11 PROCEDURE — PNV: Performed by: STUDENT IN AN ORGANIZED HEALTH CARE EDUCATION/TRAINING PROGRAM

## 2021-01-11 PROCEDURE — 90715 TDAP VACCINE 7 YRS/> IM: CPT | Performed by: STUDENT IN AN ORGANIZED HEALTH CARE EDUCATION/TRAINING PROGRAM

## 2021-01-11 PROCEDURE — 90471 IMMUNIZATION ADMIN: CPT | Performed by: STUDENT IN AN ORGANIZED HEALTH CARE EDUCATION/TRAINING PROGRAM

## 2021-01-11 NOTE — ASSESSMENT & PLAN NOTE
- Glucose tolerance test and CBC reviewed and normal  - Follow up US scheduled at 32 weeks per [facility] for BMI > 30  - Had GBS UTI this pregnancy  - Delivery consent previously signed  - Birth plan and gisela returned  - Tdap given today  - Patient Education: Fetal kick counts reviewed

## 2021-01-11 NOTE — PROGRESS NOTES
Routine Prenatal Visit  [facility] [address] [place] [address]    Assessment/Plan:  Ashlyn Ambrocio is a 30y o  year old  at 34w10d who presents for routine prenatal visit  1  29 weeks gestation of pregnancy  Assessment & Plan:  - Glucose tolerance test and CBC reviewed and normal  - Follow up US scheduled at 32 weeks per [facility] for BMI > 30  - Had GBS UTI this pregnancy  - Delivery consent previously signed  - Birth plan and storkpump returned  - Tdap given today  - Patient Education: Fetal kick counts reviewed  Orders:  -     TDAP Vaccine greater than or equal to 6yo  -     Ambulatory Referral to Maternal Fetal Medicine; Future; Expected date: 2021    2  Need for Tdap vaccination  -     TDAP Vaccine greater than or equal to 6yo    3  GBS bacteriuria    4  Anxiety  Assessment & Plan:  - Reports good control on Zoloft 100 mg daily  - Previously counseled about possible  side effects          Subjective:     CC: Prenatal care    Gely Marie is a 30 y o  Mariea Hauula female who presents for routine prenatal care at 29w5d  Pregnancy ROS: Denies leakage of fluid, pelvic pain, or vaginal bleeding  Reports normal fetal movement      The following portions of the patient's history were reviewed and updated as appropriate: allergies, current medications, past family history, past medical history, obstetric history, gynecologic history, past social history, past surgical history and problem list       Objective:  /70   Wt 85 5 kg (188 lb 9 6 oz)   LMP 2020 (Exact Date)   BMI 32 37 kg/m²   Pregravid Weight/BMI: 77 1 kg (170 lb) (BMI 29 17)  Current Weight: 85 5 kg (188 lb 9 6 oz)   Total Weight Gain: 8 437 kg (18 lb 9 6 oz)   Pre- Vitals      Most Recent Value   Prenatal Assessment   Fetal Heart Rate  135   Movement  Present   Prenatal Vitals   Blood Pressure  124/70   Weight - Scale  85 5 kg (188 lb 9 6 oz)   Urine Albumin/Glucose Dilation/Effacement/Station   Vaginal Drainage   Edema   LLE Edema  Trace   RLE Edema  Trace           General: Well appearing, no distress  Respiratory: Unlabored breathing  Cardiovascular: Regular rate  Abdomen: Soft, gravid, nontender  Fundal Height: Appropriate for gestational age  Extremities: Warm and well perfused  Non tender      Petr Blair MD  [address] [facility]  1/11/2021 6:10 PM

## 2021-01-12 ENCOUNTER — TELEPHONE (OUTPATIENT)
Dept: OBGYN CLINIC | Facility: MEDICAL CENTER | Age: 26
End: 2021-01-12

## 2021-01-12 NOTE — TELEPHONE ENCOUNTER
I left the pt a VM to call back with [facility] information, and how long she plans on being out

## 2021-01-18 ENCOUNTER — TELEPHONE (OUTPATIENT)
Dept: OBGYN CLINIC | Facility: MEDICAL CENTER | Age: 26
End: 2021-01-18

## 2021-01-18 NOTE — TELEPHONE ENCOUNTER
l called the patient to inform her that her fmla forms had been completed and she can complete the $15 payment   Patient will complete the payment and  forms 01/19/2021

## 2021-01-26 ENCOUNTER — ROUTINE PRENATAL (OUTPATIENT)
Dept: OBGYN CLINIC | Facility: MEDICAL CENTER | Age: 26
End: 2021-01-26
Payer: COMMERCIAL

## 2021-01-26 VITALS — SYSTOLIC BLOOD PRESSURE: 120 MMHG | BODY MASS INDEX: 31.76 KG/M2 | WEIGHT: 185 LBS | DIASTOLIC BLOOD PRESSURE: 74 MMHG

## 2021-01-26 DIAGNOSIS — Z34.03 ENCOUNTER FOR SUPERVISION OF NORMAL FIRST PREGNANCY IN THIRD TRIMESTER: Primary | ICD-10-CM

## 2021-01-26 DIAGNOSIS — R82.71 GBS BACTERIURIA: ICD-10-CM

## 2021-01-26 PROCEDURE — 99214 OFFICE O/P EST MOD 30 MIN: CPT | Performed by: OBSTETRICS & GYNECOLOGY

## 2021-01-26 NOTE — PROGRESS NOTES
Melody Spencer is a 30y o  year old  at [address] for routine prenatal visit    + FM, no vaginal bleeding, contractions, or LOF  Complaints: No   Most recent ultrasound and labs reviewed    Has repeat growth 21-      CBC     Went over the covid vaccine and recommendation

## 2021-01-27 ENCOUNTER — TELEPHONE (OUTPATIENT)
Dept: PERINATAL CARE | Facility: CLINIC | Age: 26
End: 2021-01-27

## 2021-01-27 NOTE — TELEPHONE ENCOUNTER
Spoke with patient and confirmed appointment with [facility]  1 support person ( must be over age of 15) may accompany patient  Will you and your support person be able to wear a mask ,without a valve , during entire appointment? YES   To minimize your exposure in our waiting area,check in and rooming questions will be done via phone  When you arrive in the parking lot please call the following inside line # prior to entering office:    Jim Dominguez: [phone]    Have you or your support person traveled outside the state in the last 2 weeks? NO  If yes, what state did you travel to? Do you or your support person have:  Fever or flu- like symptoms? NO  Symptoms of upper respiratory infection like runny nose, sore throat or cough? NO  Do you have new headache that you have not had in the past?NO  Have you experienced any new shortness of breath recently? NO  Do you have any new loss of taste or smell? NO  Do you have any new diarrhea, nausea or vomiting? NO  Have you recently been in contact with anyone who has been sick or diagnosed with COVID-19 infection? NO  Have you been recommended to quarantine because of an exposure to a confirmed positive COVID19 person? NO  Have you recently been tested for COVID19? NO    Patient verbalized understanding of all instructions   -------------------------------------------------------------

## 2021-01-28 ENCOUNTER — ULTRASOUND (OUTPATIENT)
Dept: PERINATAL CARE | Facility: OTHER | Age: 26
End: 2021-01-28
Payer: COMMERCIAL

## 2021-01-28 VITALS
WEIGHT: 187 LBS | DIASTOLIC BLOOD PRESSURE: 72 MMHG | BODY MASS INDEX: 31.92 KG/M2 | SYSTOLIC BLOOD PRESSURE: 118 MMHG | HEIGHT: 64 IN | HEART RATE: 90 BPM

## 2021-01-28 DIAGNOSIS — O99.212 OBESITY AFFECTING PREGNANCY IN SECOND TRIMESTER: ICD-10-CM

## 2021-01-28 DIAGNOSIS — Z3A.29 29 WEEKS GESTATION OF PREGNANCY: ICD-10-CM

## 2021-01-28 DIAGNOSIS — Z36.89 ENCOUNTER FOR ULTRASOUND TO ASSESS FETAL GROWTH: Primary | ICD-10-CM

## 2021-01-28 DIAGNOSIS — Z3A.32 32 WEEKS GESTATION OF PREGNANCY: ICD-10-CM

## 2021-01-28 PROCEDURE — 76816 OB US FOLLOW-UP PER FETUS: CPT | Performed by: OBSTETRICS & GYNECOLOGY

## 2021-01-28 NOTE — LETTER
January 28, 2021     Al Ochoa MD  [address]     Patient: Siddharth Burdick   YOB: 1995   Date of Visit: 1/28/2021       Dear Dr Maya Zuñiga: Thank you for referring Siddharth Burdick to me for evaluation  Below are my notes for this consultation  If you have questions, please do not hesitate to call me  I look forward to following your patient along with you  Sincerely,        Ada Rivera MD        CC: No Recipients  Ada Rivera MD  1/28/2021  5:11 PM  Sign when Signing Visit  [address]: Ms Sean Ferrell was seen today at 32w1d for fetal growth assessment ultrasound  See ultrasound report under "OB Procedures" tab    Please don't hesitate to contact our office with any concerns or questions   -Ada Rivera MD

## 2021-01-28 NOTE — PROGRESS NOTES
[address]: Ms Denys Duran was seen today at 32w1d for fetal growth assessment ultrasound  See ultrasound report under "OB Procedures" tab    Please don't hesitate to contact our office with any concerns or questions   -Dennis Horvath MD

## 2021-02-08 ENCOUNTER — TELEPHONE (OUTPATIENT)
Dept: OBGYN CLINIC | Facility: MEDICAL CENTER | Age: 26
End: 2021-02-08

## 2021-02-08 DIAGNOSIS — O99.212 OBESITY AFFECTING PREGNANCY IN SECOND TRIMESTER: Primary | ICD-10-CM

## 2021-02-08 DIAGNOSIS — R82.71 GBS BACTERIURIA: ICD-10-CM

## 2021-02-08 NOTE — TELEPHONE ENCOUNTER
----- Message from Reji Memory sent at 2/5/2021  4:09 PM EST -----  Regarding: Non-Urgent Medical Question  Contact: [phone]  Gildardo Ireland been having Luzmaria Woods or what seems to be  Its a hard lump mostly on the right side of my stomach that comes and goes, tightening more frequently  Is this something to be concerned of? I have an appointment Wednesday afternoon

## 2021-02-10 ENCOUNTER — ROUTINE PRENATAL (OUTPATIENT)
Dept: OBGYN CLINIC | Facility: MEDICAL CENTER | Age: 26
End: 2021-02-10

## 2021-02-10 VITALS — BODY MASS INDEX: 32.84 KG/M2 | SYSTOLIC BLOOD PRESSURE: 126 MMHG | DIASTOLIC BLOOD PRESSURE: 78 MMHG | WEIGHT: 191.3 LBS

## 2021-02-10 DIAGNOSIS — Z3A.34 34 WEEKS GESTATION OF PREGNANCY: Primary | ICD-10-CM

## 2021-02-10 PROCEDURE — PNV: Performed by: STUDENT IN AN ORGANIZED HEALTH CARE EDUCATION/TRAINING PROGRAM

## 2021-02-10 NOTE — ASSESSMENT & PLAN NOTE
- GBS positive by bacteruria  - Delivery Plan: Await spontaneous labor  - APFS not indicated  - Last growth: 1/28/21  EFW (Ac/Fl/Hc)  2239 grams  (74%)  - Feeding: Breastfeeding awaiting Storkpump  - Wet prep done today for vaginal discharge; no yeast no pathogens no ferning or pooling; c/w normal dscharge  - Patient Education: Fetal kick counts and labor precautions reviewed  Perineal massage education reviewed

## 2021-02-10 NOTE — PROGRESS NOTES
Routine Prenatal Visit  [facility] [address] [place] [address]    Assessment/Plan:  Sofia Wright is a 30y o  year old  at 34w0d who presents for routine prenatal visit  1  34 weeks gestation of pregnancy  Assessment & Plan:  - GBS positive by bacteruria  - Delivery Plan: Await spontaneous labor  - APFS not indicated  - Last growth: 21  EFW (Ac/Fl/Hc)  2239 grams  (74%)  - Feeding: Breastfeeding awaiting Storkpump  - Wet prep done today for vaginal discharge; no yeast no pathogens no ferning or pooling; c/w normal dscharge  - Patient Education: Fetal kick counts and labor precautions reviewed  Perineal massage education reviewed  Subjective:     CC: Prenatal care    Fang Peña is a 30 y o   female who presents for routine prenatal care at 34w0d  Pregnancy ROS: Denies leakage of fluid, pelvic pain, or vaginal bleeding  Reports normal fetal movement  Vaginal discharge  Pressure in groin  The following portions of the patient's history were reviewed and updated as appropriate: allergies, current medications, past family history, past medical history, obstetric history, gynecologic history, past social history, past surgical history and problem list       Objective:  /78   Wt 86 8 kg (191 lb 4 8 oz)   LMP 2020 (Exact Date)   BMI 32 84 kg/m²   Pregravid Weight/BMI: 77 1 kg (170 lb) (BMI 29 17)  Current Weight: 86 8 kg (191 lb 4 8 oz)   Total Weight Gain: 9 662 kg (21 lb 4 8 oz)   Pre-Dana Vitals      Most Recent Value   Prenatal Assessment   Fetal Heart Rate  136   Movement  Present   Prenatal Vitals   Blood Pressure  126/78   Weight - Scale  86 8 kg (191 lb 4 8 oz)   Urine Albumin/Glucose   Dilation/Effacement/Station   Vaginal Drainage   Edema   LLE Edema  Trace   RLE Edema  Trace           General: Well appearing, no distress  Respiratory: Unlabored breathing  Cardiovascular: Regular rate    Abdomen: Soft, gravid, nontender  Fundal Height: Appropriate for gestational age  Extremities: Warm and well perfused  Non tender      Astrid Rosales MD  [address] [place] [address]  2/10/2021 3:50 PM

## 2021-02-23 DIAGNOSIS — F41.9 ANXIETY: ICD-10-CM

## 2021-02-23 RX ORDER — SERTRALINE HYDROCHLORIDE 100 MG/1
TABLET, FILM COATED ORAL
Qty: 90 TABLET | Refills: 0 | Status: SHIPPED | OUTPATIENT
Start: 2021-02-23 | End: 2021-06-29

## 2021-02-25 ENCOUNTER — ROUTINE PRENATAL (OUTPATIENT)
Dept: OBGYN CLINIC | Facility: MEDICAL CENTER | Age: 26
End: 2021-02-25
Payer: COMMERCIAL

## 2021-02-25 VITALS
DIASTOLIC BLOOD PRESSURE: 78 MMHG | SYSTOLIC BLOOD PRESSURE: 136 MMHG | WEIGHT: 194 LBS | HEIGHT: 64 IN | BODY MASS INDEX: 33.12 KG/M2

## 2021-02-25 DIAGNOSIS — O16.3 HYPERTENSION DURING PREGNANCY IN THIRD TRIMESTER, UNSPECIFIED HYPERTENSION IN PREGNANCY TYPE: Primary | ICD-10-CM

## 2021-02-25 DIAGNOSIS — R82.71 GBS BACTERIURIA: ICD-10-CM

## 2021-02-25 LAB
ALBUMIN SERPL BCP-MCNC: 3.1 G/DL (ref 3.5–5)
ALP SERPL-CCNC: 135 U/L (ref 46–116)
ALT SERPL W P-5'-P-CCNC: 21 U/L (ref 12–78)
ANION GAP SERPL CALCULATED.3IONS-SCNC: 11 MMOL/L (ref 4–13)
AST SERPL W P-5'-P-CCNC: 12 U/L (ref 5–45)
BASOPHILS # BLD AUTO: 0.02 THOUSANDS/ΜL (ref 0–0.1)
BASOPHILS NFR BLD AUTO: 0 % (ref 0–1)
BILIRUB SERPL-MCNC: 0.27 MG/DL (ref 0.2–1)
BUN SERPL-MCNC: 8 MG/DL (ref 5–25)
CALCIUM ALBUM COR SERPL-MCNC: 11 MG/DL (ref 8.3–10.1)
CALCIUM SERPL-MCNC: 10.3 MG/DL (ref 8.3–10.1)
CHLORIDE SERPL-SCNC: 111 MMOL/L (ref 100–108)
CO2 SERPL-SCNC: 22 MMOL/L (ref 21–32)
CREAT SERPL-MCNC: 0.76 MG/DL (ref 0.6–1.3)
CREAT UR-MCNC: 57.1 MG/DL
EOSINOPHIL # BLD AUTO: 0.05 THOUSAND/ΜL (ref 0–0.61)
EOSINOPHIL NFR BLD AUTO: 1 % (ref 0–6)
ERYTHROCYTE [DISTWIDTH] IN BLOOD BY AUTOMATED COUNT: 13.5 % (ref 11.6–15.1)
GFR SERPL CREATININE-BSD FRML MDRD: 108 ML/MIN/1.73SQ M
GLUCOSE SERPL-MCNC: 76 MG/DL (ref 65–140)
HCT VFR BLD AUTO: 41 % (ref 34.8–46.1)
HGB BLD-MCNC: 13.9 G/DL (ref 11.5–15.4)
IMM GRANULOCYTES # BLD AUTO: 0.07 THOUSAND/UL (ref 0–0.2)
IMM GRANULOCYTES NFR BLD AUTO: 1 % (ref 0–2)
LYMPHOCYTES # BLD AUTO: 0.8 THOUSANDS/ΜL (ref 0.6–4.47)
LYMPHOCYTES NFR BLD AUTO: 9 % (ref 14–44)
MCH RBC QN AUTO: 31.1 PG (ref 26.8–34.3)
MCHC RBC AUTO-ENTMCNC: 33.9 G/DL (ref 31.4–37.4)
MCV RBC AUTO: 92 FL (ref 82–98)
MONOCYTES # BLD AUTO: 0.93 THOUSAND/ΜL (ref 0.17–1.22)
MONOCYTES NFR BLD AUTO: 10 % (ref 4–12)
NEUTROPHILS # BLD AUTO: 7.24 THOUSANDS/ΜL (ref 1.85–7.62)
NEUTS SEG NFR BLD AUTO: 79 % (ref 43–75)
NRBC BLD AUTO-RTO: 0 /100 WBCS
PLATELET # BLD AUTO: 147 THOUSANDS/UL (ref 149–390)
PMV BLD AUTO: 12.2 FL (ref 8.9–12.7)
POTASSIUM SERPL-SCNC: 3.9 MMOL/L (ref 3.5–5.3)
PROT SERPL-MCNC: 6.5 G/DL (ref 6.4–8.2)
PROT UR-MCNC: 13 MG/DL
PROT/CREAT UR: 0.23 MG/G{CREAT} (ref 0–0.1)
RBC # BLD AUTO: 4.47 MILLION/UL (ref 3.81–5.12)
SODIUM SERPL-SCNC: 144 MMOL/L (ref 136–145)
WBC # BLD AUTO: 9.11 THOUSAND/UL (ref 4.31–10.16)

## 2021-02-25 PROCEDURE — 85025 COMPLETE CBC W/AUTO DIFF WBC: CPT | Performed by: OBSTETRICS & GYNECOLOGY

## 2021-02-25 PROCEDURE — 84156 ASSAY OF PROTEIN URINE: CPT | Performed by: OBSTETRICS & GYNECOLOGY

## 2021-02-25 PROCEDURE — 80053 COMPREHEN METABOLIC PANEL: CPT | Performed by: OBSTETRICS & GYNECOLOGY

## 2021-02-25 PROCEDURE — PNV: Performed by: OBSTETRICS & GYNECOLOGY

## 2021-02-25 PROCEDURE — 82570 ASSAY OF URINE CREATININE: CPT | Performed by: OBSTETRICS & GYNECOLOGY

## 2021-02-25 PROCEDURE — 36415 COLL VENOUS BLD VENIPUNCTURE: CPT | Performed by: OBSTETRICS & GYNECOLOGY

## 2021-02-26 NOTE — PROGRESS NOTES
Routine Prenatal Visit  [facility] [address] [place] [address]    Assessment/Plan:  Melody Spencer is a 30y o  year old  at 36w1d who presents for routine prenatal visit  1  Hypertension during pregnancy in third trimester, unspecified hypertension in pregnancy type  -     Protein / creatinine ratio, urine  -     CBC and differential  -     Comprehensive metabolic panel          Subjective:     CC: Prenatal care    Wil Smith is a 30 y o  Evlyn Netters female who presents for routine prenatal care at 36w1d  Pregnancy ROS: no leakage of fluid, pelvic pain, or vaginal bleeding  yes fetal movement  Pt complaints of hands swelling she does have history of carpal tunnel   Concern for developing PEC     The following portions of the patient's history were reviewed and updated as appropriate: allergies, current medications, past family history, past medical history, obstetric history, gynecologic history, past social history, past surgical history and problem list       Objective:  /78   Ht 5' 4" (1 626 m)   Wt 88 kg (194 lb)   LMP 2020 (Exact Date)   BMI 33 30 kg/m²   Pregravid Weight/BMI: 77 1 kg (170 lb) (BMI 29 17)  Current Weight: 88 kg (194 lb)   Total Weight Gain: 10 9 kg (24 lb)   Pre-Dana Vitals      Most Recent Value   Prenatal Assessment   Fetal Heart Rate  135   Movement  Present   Prenatal Vitals   Blood Pressure  136/78   Weight - Scale  88 kg (194 lb)   Urine Albumin/Glucose   Dilation/Effacement/Station   Vaginal Drainage   Edema   LLE Edema  Trace   RLE Edema  Trace           General: Well appearing, no distress  Respiratory: Unlabored breathing  Cardiovascular: Regular rate  Abdomen: Soft, gravid, nontender  Fundal Height: Appropriate for gestational age  Extremities: Warm and well perfused  Non tender

## 2021-02-27 ENCOUNTER — HOSPITAL ENCOUNTER (OUTPATIENT)
Facility: HOSPITAL | Age: 26
Discharge: HOME | End: 2021-02-27
Attending: OBSTETRICS & GYNECOLOGY | Admitting: OBSTETRICS & GYNECOLOGY
Payer: COMMERCIAL

## 2021-02-27 ENCOUNTER — TELEPHONE (OUTPATIENT)
Dept: OBGYN CLINIC | Facility: CLINIC | Age: 26
End: 2021-02-27

## 2021-02-27 ENCOUNTER — NURSE TRIAGE (OUTPATIENT)
Dept: OTHER | Facility: OTHER | Age: 26
End: 2021-02-27

## 2021-02-27 VITALS
WEIGHT: 190 LBS | HEIGHT: 64 IN | DIASTOLIC BLOOD PRESSURE: 81 MMHG | BODY MASS INDEX: 32.44 KG/M2 | SYSTOLIC BLOOD PRESSURE: 123 MMHG | RESPIRATION RATE: 18 BRPM | HEART RATE: 76 BPM | TEMPERATURE: 98.3 F

## 2021-02-27 DIAGNOSIS — D69.6 BENIGN GESTATIONAL THROMBOCYTOPENIA IN THIRD TRIMESTER (HCC): Primary | ICD-10-CM

## 2021-02-27 DIAGNOSIS — O99.113 BENIGN GESTATIONAL THROMBOCYTOPENIA IN THIRD TRIMESTER (HCC): Primary | ICD-10-CM

## 2021-02-27 PROBLEM — O14.00 ANTEPARTUM MILD PREECLAMPSIA: Status: ACTIVE | Noted: 2021-02-27

## 2021-02-27 PROBLEM — Z3A.36 36 WEEKS GESTATION OF PREGNANCY: Status: ACTIVE | Noted: 2020-12-07

## 2021-02-27 LAB
ALBUMIN SERPL BCP-MCNC: 2.6 G/DL (ref 3.5–5)
ALP SERPL-CCNC: 116 U/L (ref 46–116)
ALT SERPL W P-5'-P-CCNC: 17 U/L (ref 12–78)
ANION GAP SERPL CALCULATED.3IONS-SCNC: 8 MMOL/L (ref 4–13)
AST SERPL W P-5'-P-CCNC: 10 U/L (ref 5–45)
BILIRUB SERPL-MCNC: 0.19 MG/DL (ref 0.2–1)
BUN SERPL-MCNC: 8 MG/DL (ref 5–25)
CALCIUM ALBUM COR SERPL-MCNC: 10.1 MG/DL (ref 8.3–10.1)
CALCIUM SERPL-MCNC: 9 MG/DL (ref 8.3–10.1)
CHLORIDE SERPL-SCNC: 105 MMOL/L (ref 100–108)
CO2 SERPL-SCNC: 24 MMOL/L (ref 21–32)
CREAT SERPL-MCNC: 0.69 MG/DL (ref 0.6–1.3)
CREAT UR-MCNC: 27.3 MG/DL
ERYTHROCYTE [DISTWIDTH] IN BLOOD BY AUTOMATED COUNT: 13.3 % (ref 11.6–15.1)
GFR SERPL CREATININE-BSD FRML MDRD: 120 ML/MIN/1.73SQ M
GLUCOSE SERPL-MCNC: 72 MG/DL (ref 65–140)
HCT VFR BLD AUTO: 38 % (ref 34.8–46.1)
HGB BLD-MCNC: 12.7 G/DL (ref 11.5–15.4)
MCH RBC QN AUTO: 31.3 PG (ref 26.8–34.3)
MCHC RBC AUTO-ENTMCNC: 33.4 G/DL (ref 31.4–37.4)
MCV RBC AUTO: 94 FL (ref 82–98)
PLATELET # BLD AUTO: 130 THOUSANDS/UL (ref 149–390)
PMV BLD AUTO: 11.5 FL (ref 8.9–12.7)
POTASSIUM SERPL-SCNC: 3.6 MMOL/L (ref 3.5–5.3)
PROT SERPL-MCNC: 6.4 G/DL (ref 6.4–8.2)
PROT UR-MCNC: 12 MG/DL
PROT/CREAT UR: 0.44 MG/G{CREAT} (ref 0–0.1)
RBC # BLD AUTO: 4.06 MILLION/UL (ref 3.81–5.12)
SODIUM SERPL-SCNC: 137 MMOL/L (ref 136–145)
WBC # BLD AUTO: 7.18 THOUSAND/UL (ref 4.31–10.16)

## 2021-02-27 PROCEDURE — 84156 ASSAY OF PROTEIN URINE: CPT | Performed by: OBSTETRICS & GYNECOLOGY

## 2021-02-27 PROCEDURE — 85027 COMPLETE CBC AUTOMATED: CPT | Performed by: OBSTETRICS & GYNECOLOGY

## 2021-02-27 PROCEDURE — 99213 OFFICE O/P EST LOW 20 MIN: CPT

## 2021-02-27 PROCEDURE — 80053 COMPREHEN METABOLIC PANEL: CPT | Performed by: OBSTETRICS & GYNECOLOGY

## 2021-02-27 PROCEDURE — 82570 ASSAY OF URINE CREATININE: CPT | Performed by: OBSTETRICS & GYNECOLOGY

## 2021-02-27 PROCEDURE — 99214 OFFICE O/P EST MOD 30 MIN: CPT | Performed by: OBSTETRICS & GYNECOLOGY

## 2021-02-27 RX ORDER — METHYLPREDNISOLONE 4 MG/1
TABLET ORAL
Qty: 21 TABLET | Refills: 0 | Status: SHIPPED | OUTPATIENT
Start: 2021-02-27 | End: 2021-03-05 | Stop reason: HOSPADM

## 2021-02-27 NOTE — TELEPHONE ENCOUNTER
Additional Information   Commented on: [1] Follow-up call from patient regarding patient's clinical status AND [2] information urgent     TT sent to on-call provider with patient's concerns and symptoms      Protocols used: PCP CALL - NO TRIAGE-ADULT-

## 2021-02-27 NOTE — DISCHARGE INSTRUCTIONS
Preeclampsia During Pregnancy   WHAT YOU NEED TO KNOW:   Preeclampsia is high blood pressure (BP) that usually develops after week 20 of pregnancy  It can also develop days or weeks after delivery  Your blood pressure may be 140/90 or higher  One or both numbers may be high  You may also have protein in your urine or damage to organs such as your kidneys or liver  Chronic hypertension with superimposed preeclampsia is preeclampsia in a woman with a history of hypertension before pregnancy  It can also be preeclampsia that develops before week 20 of pregnancy  Preeclampsia can lead to life-threatening conditions such as a stroke, eclampsia (seizures), or HELLP syndrome (blood cell destruction)  It is important to get screened for high BP during pregnancy  High BP does not always cause symptoms  Symptoms that do develop may be general, such as headaches and swelling that you may think are not serious  DISCHARGE INSTRUCTIONS:   Call your local emergency number (911 in the [address] [place] [address],[address]) if:   · You have a seizure  · You have chest pain  Seek care immediately if:   · You have severe abdominal pain with or without nausea and vomiting  · You develop a severe headache that does not go away with medicine  · You have blurred or spotted vision that does not go away  · You are bleeding from your vagina  Call your doctor or obstetrician if:   · You have new or increased swelling in your face or hands  · You are urinating little or not at all  · You do not feel your baby's movements as often as usual     · You have questions or concerns about your condition or care  Medicines: You may need any of the following:  · Blood pressure medicine  helps lower your blood pressure and protects your heart, lungs, brain, and kidneys  Take your blood pressure medicine exactly as directed  · Steroid medicine  helps your baby's lungs develop   These may be given if you have to deliver before 37 weeks of pregnancy  · Low doses of aspirin  may be recommended after 12 weeks of pregnancy if you are at high risk for preeclampsia  Aspirin may help prevent preeclampsia or problems that can happen from preeclampsia  Do not take aspirin unless directed by your healthcare provider  · Take your medicine as directed  Contact your healthcare provider if you think your medicine is not helping or if you have side effects  Tell him or her if you are allergic to any medicine  Keep a list of the medicines, vitamins, and herbs you take  Include the amounts, and when and why you take them  Bring the list or the pill bottles to follow-up visits  Carry your medicine list with you in case of an emergency  Manage preeclampsia:  Your BP will need to be checked by healthcare providers 1 to 2 times each week until your baby is born  The following are ways you can help manage high BP during pregnancy:  · Rest as directed  Your healthcare provider may tell you to rest more often if you have mild symptoms of preeclampsia  You may need to be in the hospital if your condition worsens  · Do not drink alcohol or smoke  Alcohol, nicotine, and other chemicals in cigarettes and cigars, can increase your BP  They can also harm your baby  Ask your healthcare provider for information if you currently drink alcohol or smoke and need help to quit  E-cigarettes or smokeless tobacco still contain nicotine  Talk to your healthcare provider before you use these products  · Do kick counts as directed  You may need to keep track of how often your baby moves or kicks over a certain amount of time  Ask your obstetrician how to do kick counts and how often to do them  · Check your weight each day  Weigh yourself every day before breakfast  Weight gain can be a sign of extra fluid in your body  Call your obstetrician if you have gained 2 or more pounds in a week  Follow up with your obstetrician as directed:   You will need tests 1 to 2 times a week to check your condition  Tests include blood pressure checks, urine and blood tests, and fetal monitoring  Write down your questions so you remember to ask them during your visits  © Copyright [address] Information is for End User's use only and may not be sold, redistributed or otherwise used for commercial purposes  All illustrations and images included in CareNotes® are the copyrighted property of A D A M , Inc  or [facility]   The above information is an  only  It is not intended as medical advice for individual conditions or treatments  Talk to your doctor, nurse or pharmacist before following any medical regimen to see if it is safe and effective for you

## 2021-02-27 NOTE — TELEPHONE ENCOUNTER
Regarding: " I am concerned about my blood pressure, 139/101  I am 36 weeks pregnant "   ----- Message from Lennox Burrs, RN sent at 2/27/2021 11:16 AM EST -----  Pt instructed to take additional BP measurement and await RN call

## 2021-02-27 NOTE — PROGRESS NOTES
L&D Triage Note - OB/GYN  Denia Melvin 30 y o  female MRN: [id number]  Unit/Bed#: L&D 327-01 Encounter: [id number]      Assessment:  30 y o  Evelio Hurtado at 36w3d with preeclampsia withOUT severe features    Plan:  1  PreE w/o SF  - Elevated blood pressure of 133/91 while in triage  - P:C ratio 0 44 elevated from 0 23 on 2/25  - Platelets decreased from 147 to 130 from 2/25  - No other signs or symptoms of preeclampsia at this time  - Recommended 37 week IOL which patient is agreeable to  She is currently scheduled for Wednesday morning at 0800  We reviewed methods of induction  - Reviewed preeclampsia and labor precautions    Patient for discharge home  Discussed with Dr Latina Lanes  ______________________________________________________________________      Chief Compliant: elevated blood pressures at home    TIME: 1400  Subjective:  30 y o  Evelio Hurtado at 36w3d who presents with elevated blood pressures at home  Her BP at home was 139/101 and 145/89  Her preeclampsia labs completed on 2/25/2021 showed gestational thrombocytopenia  She reports a mild headache earlier today that resolved without intervention  She has occasional RUQ pain from baby's foot in her ribs  She denies changes in her vision  She does not report feeling contractions  She denies loss of fluid or vaginal bleeding  She reports good fetal movement         Objective:  Vitals:    02/27/21 1348   BP: 123/81   Pulse: 76   Resp: 18   Temp: 98 3 °F (36 8 °C)       SVE: Closed/ thick/ high  FHT:  120 / Moderate 6 - 25 bpm / accelerations, no decelerations  [place]: q2-6 minutes, irritability, mild    Belia Gordon MD 2/27/2021 2:43 PM

## 2021-02-27 NOTE — TELEPHONE ENCOUNTER
Patient was contacted by triage nurse  Patient's OB/Gyn office is not an office that [id number] [place] [address] for  Listened to patient's BP readings and symptoms and told patient that her concerns will be paged out to the on-call provider for the office  TT sent to on-call, Dr Justyna Min, "[facility]   1356/ patient is 36w3d and called due to high BP readings  This morning was 139/101 and about 10 minutes ago it was 145/89  Reports weakness, denies other symptoms  Normal fetal movement  She said she was in the office last week and was being tested for preeclampsia but no results yet  Patient's contact number is [phone]"

## 2021-02-27 NOTE — TELEPHONE ENCOUNTER
Patient called with recordings of elevated BPs at home  139/101 and 145/89  Patient states she had a headache last night, but has since resolved  No epigastric pain or blurry vision  Reviewed labs from 2/25/2021  P:C ratio 0 23   Hb 13 9, platelets 216  Instructed to proceed to L&D for rule out pre-eclampsia

## 2021-03-01 ENCOUNTER — TELEPHONE (OUTPATIENT)
Dept: OBGYN CLINIC | Facility: MEDICAL CENTER | Age: 26
End: 2021-03-01

## 2021-03-03 ENCOUNTER — ANESTHESIA (INPATIENT)
Dept: ANESTHESIOLOGY | Facility: HOSPITAL | Age: 26
End: 2021-03-03
Payer: COMMERCIAL

## 2021-03-03 ENCOUNTER — ANESTHESIA EVENT (INPATIENT)
Dept: ANESTHESIOLOGY | Facility: HOSPITAL | Age: 26
End: 2021-03-03
Payer: COMMERCIAL

## 2021-03-03 ENCOUNTER — HOSPITAL ENCOUNTER (OUTPATIENT)
Dept: LABOR AND DELIVERY | Facility: HOSPITAL | Age: 26
Discharge: HOME | End: 2021-03-03
Payer: COMMERCIAL

## 2021-03-03 ENCOUNTER — HOSPITAL ENCOUNTER (INPATIENT)
Facility: HOSPITAL | Age: 26
LOS: 2 days | Discharge: HOME | End: 2021-03-05
Attending: OBSTETRICS & GYNECOLOGY | Admitting: OBSTETRICS & GYNECOLOGY
Payer: COMMERCIAL

## 2021-03-03 PROBLEM — O99.611 CONSTIPATION IN PREGNANCY IN FIRST TRIMESTER: Status: RESOLVED | Noted: 2020-09-14 | Resolved: 2021-03-03

## 2021-03-03 PROBLEM — O14.93 PRE-ECLAMPSIA IN THIRD TRIMESTER: Status: ACTIVE | Noted: 2021-02-27

## 2021-03-03 PROBLEM — K59.00 CONSTIPATION IN PREGNANCY IN FIRST TRIMESTER: Status: RESOLVED | Noted: 2020-09-14 | Resolved: 2021-03-03

## 2021-03-03 LAB
ABO GROUP BLD: NORMAL
ALBUMIN SERPL BCP-MCNC: 2.9 G/DL (ref 3.5–5)
ALP SERPL-CCNC: 125 U/L (ref 46–116)
ALT SERPL W P-5'-P-CCNC: 23 U/L (ref 12–78)
ANION GAP SERPL CALCULATED.3IONS-SCNC: 12 MMOL/L (ref 4–13)
AST SERPL W P-5'-P-CCNC: 16 U/L (ref 5–45)
BASE EXCESS BLDCOV CALC-SCNC: -6.4 MMOL/L (ref 1–9)
BASOPHILS # BLD AUTO: 0.01 THOUSANDS/ΜL (ref 0–0.1)
BASOPHILS NFR BLD AUTO: 0 % (ref 0–1)
BILIRUB SERPL-MCNC: 0.23 MG/DL (ref 0.2–1)
BLD GP AB SCN SERPL QL: NEGATIVE
BUN SERPL-MCNC: 11 MG/DL (ref 5–25)
CALCIUM ALBUM COR SERPL-MCNC: 10.4 MG/DL (ref 8.3–10.1)
CALCIUM SERPL-MCNC: 9.5 MG/DL (ref 8.3–10.1)
CHLORIDE SERPL-SCNC: 106 MMOL/L (ref 100–108)
CO2 SERPL-SCNC: 21 MMOL/L (ref 21–32)
CREAT SERPL-MCNC: 0.72 MG/DL (ref 0.6–1.3)
CREAT UR-MCNC: 68.7 MG/DL
EOSINOPHIL # BLD AUTO: 0.04 THOUSAND/ΜL (ref 0–0.61)
EOSINOPHIL NFR BLD AUTO: 1 % (ref 0–6)
ERYTHROCYTE [DISTWIDTH] IN BLOOD BY AUTOMATED COUNT: 13.2 % (ref 11.6–15.1)
EXTERNAL GROUP B STREP ANTIGEN: POSITIVE
GFR SERPL CREATININE-BSD FRML MDRD: 115 ML/MIN/1.73SQ M
GLUCOSE SERPL-MCNC: 91 MG/DL (ref 65–140)
HCO3 BLDCOV-SCNC: 20.1 MMOL/L (ref 12.2–28.6)
HCT VFR BLD AUTO: 38.6 % (ref 34.8–46.1)
HGB BLD-MCNC: 13.3 G/DL (ref 11.5–15.4)
IMM GRANULOCYTES # BLD AUTO: 0.06 THOUSAND/UL (ref 0–0.2)
IMM GRANULOCYTES NFR BLD AUTO: 1 % (ref 0–2)
LYMPHOCYTES # BLD AUTO: 0.51 THOUSANDS/ΜL (ref 0.6–4.47)
LYMPHOCYTES NFR BLD AUTO: 7 % (ref 14–44)
MCH RBC QN AUTO: 31.7 PG (ref 26.8–34.3)
MCHC RBC AUTO-ENTMCNC: 34.5 G/DL (ref 31.4–37.4)
MCV RBC AUTO: 92 FL (ref 82–98)
MONOCYTES # BLD AUTO: 0.6 THOUSAND/ΜL (ref 0.17–1.22)
MONOCYTES NFR BLD AUTO: 8 % (ref 4–12)
NEUTROPHILS # BLD AUTO: 6.33 THOUSANDS/ΜL (ref 1.85–7.62)
NEUTS SEG NFR BLD AUTO: 83 % (ref 43–75)
NRBC BLD AUTO-RTO: 0 /100 WBCS
OXYHGB MFR BLDCOV: 57.9 %
PCO2 BLDCOV: 43.2 MM HG (ref 27–43)
PH BLDCOV: 7.29 [PH] (ref 7.19–7.49)
PLATELET # BLD AUTO: 138 THOUSANDS/UL (ref 149–390)
PMV BLD AUTO: 11.9 FL (ref 8.9–12.7)
PO2 BLDCOV: 24.6 MM HG (ref 15–45)
POTASSIUM SERPL-SCNC: 3.6 MMOL/L (ref 3.5–5.3)
PROT SERPL-MCNC: 7 G/DL (ref 6.4–8.2)
PROT UR-MCNC: 27 MG/DL
PROT/CREAT UR: 0.39 MG/G{CREAT} (ref 0–0.1)
RBC # BLD AUTO: 4.19 MILLION/UL (ref 3.81–5.12)
RH BLD: POSITIVE
SAO2 % BLDCOV: 13 ML/DL
SODIUM SERPL-SCNC: 139 MMOL/L (ref 136–145)
SPECIMEN EXPIRATION DATE: NORMAL
WBC # BLD AUTO: 7.55 THOUSAND/UL (ref 4.31–10.16)

## 2021-03-03 PROCEDURE — 0HQ9XZZ REPAIR PERINEUM SKIN, EXTERNAL APPROACH: ICD-10-PCS | Performed by: OBSTETRICS & GYNECOLOGY

## 2021-03-03 PROCEDURE — 10907ZC DRAINAGE OF AMNIOTIC FLUID, THERAPEUTIC FROM PRODUCTS OF CONCEPTION, VIA NATURAL OR ARTIFICIAL OPENING: ICD-10-PCS | Performed by: OBSTETRICS & GYNECOLOGY

## 2021-03-03 PROCEDURE — 88307 TISSUE EXAM BY PATHOLOGIST: CPT | Performed by: PATHOLOGY

## 2021-03-03 PROCEDURE — 86900 BLOOD TYPING SEROLOGIC ABO: CPT | Performed by: OBSTETRICS & GYNECOLOGY

## 2021-03-03 PROCEDURE — 3E0P7VZ INTRODUCTION OF HORMONE INTO FEMALE REPRODUCTIVE, VIA NATURAL OR ARTIFICIAL OPENING: ICD-10-PCS | Performed by: OBSTETRICS & GYNECOLOGY

## 2021-03-03 PROCEDURE — 59400 OBSTETRICAL CARE: CPT | Performed by: OBSTETRICS & GYNECOLOGY

## 2021-03-03 PROCEDURE — 86901 BLOOD TYPING SEROLOGIC RH(D): CPT | Performed by: OBSTETRICS & GYNECOLOGY

## 2021-03-03 PROCEDURE — 85025 COMPLETE CBC W/AUTO DIFF WBC: CPT | Performed by: OBSTETRICS & GYNECOLOGY

## 2021-03-03 PROCEDURE — 86850 RBC ANTIBODY SCREEN: CPT | Performed by: OBSTETRICS & GYNECOLOGY

## 2021-03-03 PROCEDURE — 82805 BLOOD GASES W/O2 SATURATION: CPT | Performed by: OBSTETRICS & GYNECOLOGY

## 2021-03-03 PROCEDURE — 80053 COMPREHEN METABOLIC PANEL: CPT | Performed by: OBSTETRICS & GYNECOLOGY

## 2021-03-03 PROCEDURE — 84156 ASSAY OF PROTEIN URINE: CPT | Performed by: OBSTETRICS & GYNECOLOGY

## 2021-03-03 PROCEDURE — NC001 PR NO CHARGE: Performed by: OBSTETRICS & GYNECOLOGY

## 2021-03-03 PROCEDURE — 82570 ASSAY OF URINE CREATININE: CPT | Performed by: OBSTETRICS & GYNECOLOGY

## 2021-03-03 RX ORDER — DIAPER,BRIEF,INFANT-TODD,DISP
1 EACH MISCELLANEOUS 4 TIMES DAILY PRN
Status: DISCONTINUED | OUTPATIENT
Start: 2021-03-03 | End: 2021-03-06 | Stop reason: HOSPADM

## 2021-03-03 RX ORDER — ACETAMINOPHEN 325 MG/1
650 TABLET ORAL EVERY 4 HOURS PRN
Status: DISCONTINUED | OUTPATIENT
Start: 2021-03-03 | End: 2021-03-06 | Stop reason: HOSPADM

## 2021-03-03 RX ORDER — BUTORPHANOL TARTRATE 1 MG/ML
1 INJECTION, SOLUTION INTRAMUSCULAR; INTRAVENOUS ONCE
Status: COMPLETED | OUTPATIENT
Start: 2021-03-03 | End: 2021-03-03

## 2021-03-03 RX ORDER — LIDOCAINE HYDROCHLORIDE AND EPINEPHRINE 15; 5 MG/ML; UG/ML
INJECTION, SOLUTION EPIDURAL
Status: COMPLETED | OUTPATIENT
Start: 2021-03-03 | End: 2021-03-03

## 2021-03-03 RX ORDER — SERTRALINE HYDROCHLORIDE 100 MG/1
100 TABLET, FILM COATED ORAL DAILY
Status: DISCONTINUED | OUTPATIENT
Start: 2021-03-04 | End: 2021-03-06 | Stop reason: HOSPADM

## 2021-03-03 RX ORDER — DIPHENHYDRAMINE HCL 25 MG
25 TABLET ORAL EVERY 6 HOURS PRN
Status: DISCONTINUED | OUTPATIENT
Start: 2021-03-03 | End: 2021-03-06 | Stop reason: HOSPADM

## 2021-03-03 RX ORDER — ROPIVACAINE HYDROCHLORIDE 2 MG/ML
INJECTION, SOLUTION EPIDURAL; INFILTRATION; PERINEURAL
Status: COMPLETED
Start: 2021-03-03 | End: 2021-03-03

## 2021-03-03 RX ORDER — ONDANSETRON 2 MG/ML
4 INJECTION INTRAMUSCULAR; INTRAVENOUS EVERY 8 HOURS PRN
Status: DISCONTINUED | OUTPATIENT
Start: 2021-03-03 | End: 2021-03-06 | Stop reason: HOSPADM

## 2021-03-03 RX ORDER — OXYTOCIN/RINGER'S LACTATE 30/500 ML
1-30 PLASTIC BAG, INJECTION (ML) INTRAVENOUS
Status: DISCONTINUED | OUTPATIENT
Start: 2021-03-03 | End: 2021-03-03

## 2021-03-03 RX ORDER — IBUPROFEN 600 MG/1
600 TABLET ORAL EVERY 6 HOURS PRN
Status: DISCONTINUED | OUTPATIENT
Start: 2021-03-03 | End: 2021-03-06 | Stop reason: HOSPADM

## 2021-03-03 RX ORDER — ROPIVACAINE HYDROCHLORIDE 2 MG/ML
INJECTION, SOLUTION EPIDURAL; INFILTRATION; PERINEURAL AS NEEDED
Status: DISCONTINUED | OUTPATIENT
Start: 2021-03-03 | End: 2021-03-03 | Stop reason: HOSPADM

## 2021-03-03 RX ORDER — CALCIUM CARBONATE 200(500)MG
1000 TABLET,CHEWABLE ORAL DAILY PRN
Status: DISCONTINUED | OUTPATIENT
Start: 2021-03-03 | End: 2021-03-06 | Stop reason: HOSPADM

## 2021-03-03 RX ORDER — SODIUM CHLORIDE, SODIUM LACTATE, POTASSIUM CHLORIDE, CALCIUM CHLORIDE 600; 310; 30; 20 MG/100ML; MG/100ML; MG/100ML; MG/100ML
125 INJECTION, SOLUTION INTRAVENOUS CONTINUOUS
Status: DISCONTINUED | OUTPATIENT
Start: 2021-03-03 | End: 2021-03-03

## 2021-03-03 RX ADMIN — LIDOCAINE HYDROCHLORIDE AND EPINEPHRINE 3 ML: 15; 5 INJECTION, SOLUTION EPIDURAL at 19:04

## 2021-03-03 RX ADMIN — SODIUM CHLORIDE 5 MILLION UNITS: 0.9 INJECTION, SOLUTION INTRAVENOUS at 09:44

## 2021-03-03 RX ADMIN — ROPIVACAINE HYDROCHLORIDE 5 ML: 2 INJECTION, SOLUTION EPIDURAL; INFILTRATION; PERINEURAL at 19:10

## 2021-03-03 RX ADMIN — ROPIVACAINE HYDROCHLORIDE: 2 INJECTION, SOLUTION EPIDURAL; INFILTRATION at 19:20

## 2021-03-03 RX ADMIN — BUTORPHANOL TARTRATE 1 MG: 1 INJECTION, SOLUTION INTRAMUSCULAR; INTRAVENOUS at 17:39

## 2021-03-03 RX ADMIN — ROPIVACAINE HYDROCHLORIDE 5 ML: 2 INJECTION, SOLUTION EPIDURAL; INFILTRATION; PERINEURAL at 19:13

## 2021-03-03 RX ADMIN — SODIUM CHLORIDE 2.5 MILLION UNITS: 9 INJECTION, SOLUTION INTRAVENOUS at 18:30

## 2021-03-03 RX ADMIN — MISOPROSTOL 50 MCG: 100 TABLET ORAL at 09:43

## 2021-03-03 RX ADMIN — SODIUM CHLORIDE 2.5 MILLION UNITS: 9 INJECTION, SOLUTION INTRAVENOUS at 22:20

## 2021-03-03 RX ADMIN — SODIUM CHLORIDE, SODIUM LACTATE, POTASSIUM CHLORIDE, AND CALCIUM CHLORIDE 125 ML/HR: .6; .31; .03; .02 INJECTION, SOLUTION INTRAVENOUS at 09:44

## 2021-03-03 RX ADMIN — SODIUM CHLORIDE 2.5 MILLION UNITS: 9 INJECTION, SOLUTION INTRAVENOUS at 14:15

## 2021-03-03 RX ADMIN — Medication 2 MILLI-UNITS/MIN: at 14:21

## 2021-03-03 RX ADMIN — SODIUM CHLORIDE, SODIUM LACTATE, POTASSIUM CHLORIDE, AND CALCIUM CHLORIDE 125 ML/HR: .6; .31; .03; .02 INJECTION, SOLUTION INTRAVENOUS at 20:13

## 2021-03-03 RX ADMIN — SODIUM CHLORIDE, SODIUM LACTATE, POTASSIUM CHLORIDE, AND CALCIUM CHLORIDE 999 ML/HR: .6; .31; .03; .02 INJECTION, SOLUTION INTRAVENOUS at 18:30

## 2021-03-03 NOTE — PLAN OF CARE
Problem: Knowledge Deficit  Goal: Verbalizes understanding of labor plan  Description: Assess patient/family/caregiver's baseline knowledge level and ability to understand information  Provide education via patient/family/caregiver's preferred learning method at appropriate level of understanding  1  Provide teaching at level of understanding  2  Provide teaching via preferred learning method(s)  Outcome: Progressing  Goal: Patient/family/caregiver demonstrates understanding of disease process, treatment plan, medications, and discharge instructions  Description: Complete learning assessment and assess knowledge base  Interventions:  - Provide teaching at level of understanding  - Provide teaching via preferred learning methods  Outcome: Progressing     Problem: Labor & Delivery  Goal: Manages discomfort  Description: Assess and monitor for signs and symptoms of discomfort  Assess patient's pain level regularly and per hospital policy  Administer medications as ordered  Support use of nonpharmacological methods to help control pain such as distraction, imagery, relaxation, and application of heat and cold  Collaborate with interdisciplinary team and patient to determine appropriate pain management plan  1  Include patient in decisions related to comfort  2  Offer non-pharmacological pain management interventions  3  Report ineffective pain management to physician  Outcome: Progressing  Goal: Patient vital signs are stable  Description: 1  Assess vital signs - vaginal delivery    Outcome: Progressing     Problem: PAIN - ADULT  Goal: Verbalizes/displays adequate comfort level or baseline comfort level  Description: Interventions:  - Encourage patient to monitor pain and request assistance  - Assess pain using appropriate pain scale  - Administer analgesics based on type and severity of pain and evaluate response  - Implement non-pharmacological measures as appropriate and evaluate response  - Consider cultural and social influences on pain and pain management  - Notify physician/advanced practitioner if interventions unsuccessful or patient reports new pain  Outcome: Progressing     Problem: INFECTION - ADULT  Goal: Absence or prevention of progression during hospitalization  Description: INTERVENTIONS:  - Assess and monitor for signs and symptoms of infection  - Monitor lab/diagnostic results  - Monitor all insertion sites, i e  indwelling lines, tubes, and drains  - Monitor endotracheal if appropriate and nasal secretions for changes in amount and color  - [place] appropriate cooling/warming therapies per order  - Administer medications as ordered  - Instruct and encourage patient and family to use good hand hygiene technique  - Identify and instruct in appropriate isolation precautions for identified infection/condition  Outcome: Progressing  Goal: Absence of fever/infection during neutropenic period  Description: INTERVENTIONS:  - Monitor WBC    Outcome: Progressing     Problem: SAFETY ADULT  Goal: Patient will remain free of falls  Description: INTERVENTIONS:  - Assess patient frequently for physical needs  -  Identify cognitive and physical deficits and behaviors that affect risk of falls    -  [place] fall precautions as indicated by assessment   - Educate patient/family on patient safety including physical limitations  - Instruct patient to call for assistance with activity based on assessment  - Modify environment to reduce risk of injury  - Consider OT/PT consult to assist with strengthening/mobility  Outcome: Progressing  Goal: Maintain or return to baseline ADL function  Description: INTERVENTIONS:  -  Assess patient's ability to carry out ADLs; assess patient's baseline for ADL function and identify physical deficits which impact ability to perform ADLs (bathing, care of mouth/teeth, toileting, grooming, dressing, etc )  - Assess/evaluate cause of self-care deficits   - Assess range of motion  - Assess patient's mobility; develop plan if impaired  - Assess patient's need for assistive devices and provide as appropriate  - Encourage maximum independence but intervene and supervise when necessary  - Involve family in performance of ADLs  - Assess for home care needs following discharge   - Consider OT consult to assist with ADL evaluation and planning for discharge  - Provide patient education as appropriate  Outcome: Progressing  Goal: Maintain or return mobility status to optimal level  Description: INTERVENTIONS:  - Assess patient's baseline mobility status (ambulation, transfers, stairs, etc )    - Identify cognitive and physical deficits and behaviors that affect mobility  - Identify mobility aids required to assist with transfers and/or ambulation (gait belt, sit-to-stand, lift, walker, cane, etc )  - [place] fall precautions as indicated by assessment  - Record patient progress and toleration of activity level on Mobility SBAR; progress patient to next Phase/Stage  - Instruct patient to call for assistance with activity based on assessment  - Consider rehabilitation consult to assist with strengthening/weightbearing, etc   Outcome: Progressing     Problem: DISCHARGE PLANNING  Goal: Discharge to home or other facility with appropriate resources  Description: INTERVENTIONS:  - Identify barriers to discharge w/patient and caregiver  - Arrange for needed discharge resources and transportation as appropriate  - Identify discharge learning needs (meds, wound care, etc )  - Arrange for interpretive services to assist at discharge as needed  - Refer to Case Management Department for coordinating discharge planning if the patient needs post-hospital services based on physician/advanced practitioner order or complex needs related to functional status, cognitive ability, or social support system  Outcome: Progressing

## 2021-03-03 NOTE — ANESTHESIA PREPROCEDURE EVALUATION
Procedure:  LABOR ANALGESIA    Relevant Problems   CARDIO   (+) Hyperlipidemia   (+) Hypertension during pregnancy in third trimester   (+) Pre-eclampsia in third trimester      GYN   (+) 36 weeks gestation of pregnancy   (+) Encounter for supervision of normal first pregnancy in second trimester      NEURO/PSYCH   (+) Anxiety   (+) Moderate episode of recurrent major depressive disorder (HCC)        Physical Exam    Airway    Mallampati score: II  TM Distance: >3 FB  Neck ROM: full     Dental       Cardiovascular  Cardiovascular exam normal    Pulmonary  Pulmonary exam normal     Other Findings        Anesthesia Plan  ASA Score- 2     Anesthesia Type- epidural with ASA Monitors  Additional Monitors:   Airway Plan:           Plan Factors-    Chart reviewed  Existing labs reviewed  Patient summary reviewed  Induction-     Postoperative Plan-     Informed Consent- Anesthetic plan and risks discussed with patient

## 2021-03-03 NOTE — OB LABOR/OXYTOCIN SAFETY PROGRESS
Oxytocin Safety Progress Check Note - Zeenat Captain 30 y o  female MRN: [id number]    Unit/Bed#: L&D 326-01 Encounter: [id number]    Dose (marii-units/min) Oxytocin: 6 marii-units/min  Contraction Frequency (minutes): 2-4  Contraction Quality: Moderate  Tachysystole: No   Cervical Dilation: 4        Cervical Effacement: 60  Fetal Station: -3  Baseline Rate: 130 bpm  Fetal Heart Rate: 130 BPM  FHR Category: Category I               Vital Signs:     Vitals:    03/03/21 1555   BP: 122/66   Pulse: 76   Resp:    Temp:    SpO2:            Notes/comments:     AROM clear   Again noted to have a narrow pubic arch the fetal head did come down   Large amount of fluid noted       Real MD Nighat 3/3/2021 4:45 PM

## 2021-03-03 NOTE — OB LABOR/OXYTOCIN SAFETY PROGRESS
Labor Progress Note - Kalie Ly 30 y o  female MRN: [id number]    Unit/Bed#: L&D 326-01 Encounter: [id number]       Contraction Frequency (minutes): 7  Contraction Quality: Mild  Tachysystole: No   Cervical Dilation: 3        Cervical Effacement: 60  Fetal Station: Ballotable  Baseline Rate: 135 bpm  Fetal Heart Rate: 130 BPM  FHR Category: Category I               Vital Signs:   Vitals:    03/03/21 1320   BP: 141/83   Pulse: 86   Resp:    Temp:    SpO2:            Notes/comments:   Pt feeling light cramping after one dose of Cytotec placed vaginally at 0944  SVE 3/60/-4 at this time  Will start pitocin titration  Pitocin titration discussed with patient, she agrees  D/w Dr Denisse Marrero MD 3/3/2021 1:45 PM

## 2021-03-03 NOTE — H&P
H&P Exam - Obstetrics   Kris Feliz 30 y o  female MRN: [id number]  Unit/Bed#: L&D 326-01 Encounter: [id number]    Assessment/Plan     Assessment:  IOL for PEC without severe features  Plan:  Admit  Labs  cytotec  PCN for GBS     We had a discussion about the narrow pubic arch and the possibility of the baby not fitting into the pelvis and coming under such   She is aware that we will be unable to determine that with out labor but will keep and eye on such         History of Present Illness   Chief Complaint: for my induction of labor     HPI:  Kris Feliz is a 30 y o   female with an JULIETTE of 3/24/2021, by Last Menstrual Period at 37w0d weeks gestation who is being admitted for IOL  Her current obstetrical history is significant for pre-eclampsia, GBS colonizer  Contractions: None  Leakage of fluid: None  Bleeding: None  Fetal movement: present  Pregnancy complications: anxiety and depression - currently on zoloft   PEC - with out severe features     Review of Systems   Cardiovascular: Positive for leg swelling  Gastrointestinal: Positive for abdominal distention (gravid)  All other systems reviewed and are negative        Historical Information   OB History    Para Term  AB Living   1 0 0 0 0 0   SAB TAB Ectopic Multiple Live Births   0 0 0 0 0      # Outcome Date GA Lbr Anirudh/2nd Weight Sex Delivery Anes PTL Lv   1 Current              Baby complications/comments:   Past Medical History:   Diagnosis Date    Anxiety     Depression     Hypertension during pregnancy in third trimester 2021    Urinary tract infection     Varicella      Past Surgical History:   Procedure Laterality Date    APPENDECTOMY      WISDOM TOOTH EXTRACTION       Social History   Social History     Substance and Sexual Activity   Alcohol Use Not Currently    Frequency: Monthly or less    Comment: prior to pregnancy     Social History     Substance and Sexual Activity   Drug Use Never     Social History Tobacco Use   Smoking Status Never Smoker   Smokeless Tobacco Never Used     E-Cigarette/Vaping    E-Cigarette Use Never User      E-Cigarette/Vaping Substances    Nicotine No     THC No     CBD No     Flavoring No     Other No     Unknown No      Family History: non-contributory    Meds/Allergies   all medications and allergies reviewed  Allergies   Allergen Reactions    Lac Bovis GI Intolerance    Pollen Extract Other (See Comments)     Post nasal drip       Objective   Vitals: Blood pressure 122/80, pulse 98, temperature 98 4 °F (36 9 °C), temperature source Oral, resp  rate 18, height 5' 4" (1 626 m), weight 86 2 kg (190 lb), last menstrual period 06/17/2020  Body mass index is 32 61 kg/m²  Invasive Devices     Peripheral Intravenous Line            Peripheral IV 03/03/21 Distal;Dorsal (posterior); Right Forearm less than 1 day                Physical Exam  Vitals signs and nursing note reviewed  Exam conducted with a chaperone present  Cardiovascular:      Rate and Rhythm: Normal rate and regular rhythm  Pulses: Normal pulses  Heart sounds: Normal heart sounds  Pulmonary:      Effort: Pulmonary effort is normal       Breath sounds: Normal breath sounds  Abdominal:      General: There is distension (gravid)  Genitourinary:     General: Normal vulva  Vagina: Normal       Cervix: Normal       Uterus: Normal        Adnexa: Right adnexa normal       Rectum: Normal       Comments: patient has a narrow pubic arch room posterior   VE difficult to asse do to the arch narrow  External os feels about 2-3 cm internal fingertip             Prenatal Labs: I have personally reviewed pertinent reports    , Blood Type:   Lab Results   Component Value Date/Time    ABO Grouping A 09/09/2020 03:26 PM     , D (Rh type):   Lab Results   Component Value Date/Time    Rh Type RH(D) POSITIVE 09/09/2020 03:26 PM     , Antibody Screen: No results found for: ANTIBODYSCR , HCT/HGB:   Lab Results Component Value Date/Time    Hematocrit 38 6 03/03/2021 08:55 AM    Hemoglobin 13 3 03/03/2021 08:55 AM      , MCV:   Lab Results   Component Value Date/Time    MCV 92 03/03/2021 08:55 AM      , Platelets:   Lab Results   Component Value Date/Time    Platelets 851 (L) 70/91/1117 08:55 AM      , 1 hour Glucola:   Lab Results   Component Value Date/Time    Glucose 132 12/21/2020 04:15 PM   , Rubella: No results found for: RUBELLAIGGQT     , VDRL/RPR:   Lab Results   Component Value Date/Time    RPR NON-REACTIVE 09/09/2020 03:26 PM      , Urine Culture/Screen:   Lab Results   Component Value Date/Time    Urine Culture >100,000 cfu/ml Staphylococcus coagulase negative (A) 10/15/2019 03:42 PM    Urine Culture 20,000-29,000 cfu/ml  10/15/2019 03:42 PM       , Hep B:   Lab Results   Component Value Date/Time    Hepatitis B Surface Ag nr 09/09/2020     , HIV:   Lab Results   Component Value Date/Time    HIV-1/HIV-2 AB Non-Reactive 09/09/2020    HIV AG/AB, 4th Gen NON-REACTIVE 09/09/2020 03:26 PM     , Chlamydia:   Lab Results   Component Value Date/Time    External Chlamydia Screen neg 09/14/2020     , Gonorrhea: No results found for: LABNGO  , Group B Strep:  No results found for: STREPGRPB   - urine was positive for GBS first trimester     Imaging, EKG, Pathology, and Other Studies: I have personally reviewed pertinent reports

## 2021-03-04 PROCEDURE — 99024 POSTOP FOLLOW-UP VISIT: CPT | Performed by: OBSTETRICS & GYNECOLOGY

## 2021-03-04 RX ORDER — DOCUSATE SODIUM 100 MG/1
100 CAPSULE, LIQUID FILLED ORAL 2 TIMES DAILY
Status: DISCONTINUED | OUTPATIENT
Start: 2021-03-04 | End: 2021-03-06 | Stop reason: HOSPADM

## 2021-03-04 RX ADMIN — SERTRALINE HYDROCHLORIDE 100 MG: 100 TABLET ORAL at 08:46

## 2021-03-04 RX ADMIN — HYDROCORTISONE 1 APPLICATION: 1 CREAM TOPICAL at 00:15

## 2021-03-04 RX ADMIN — ACETAMINOPHEN 650 MG: 325 TABLET, COATED ORAL at 14:16

## 2021-03-04 RX ADMIN — IBUPROFEN 600 MG: 600 TABLET ORAL at 08:46

## 2021-03-04 RX ADMIN — WITCH HAZEL 1 PAD: 500 SOLUTION RECTAL; TOPICAL at 00:15

## 2021-03-04 RX ADMIN — BENZOCAINE AND LEVOMENTHOL: 200; 5 SPRAY TOPICAL at 00:15

## 2021-03-04 RX ADMIN — DOCUSATE SODIUM 100 MG: 100 CAPSULE, LIQUID FILLED ORAL at 08:46

## 2021-03-04 RX ADMIN — IBUPROFEN 600 MG: 600 TABLET ORAL at 16:48

## 2021-03-04 RX ADMIN — IBUPROFEN 600 MG: 600 TABLET ORAL at 02:44

## 2021-03-04 RX ADMIN — DOCUSATE SODIUM 100 MG: 100 CAPSULE, LIQUID FILLED ORAL at 16:49

## 2021-03-04 NOTE — OB LABOR/OXYTOCIN SAFETY PROGRESS
Oxytocin Safety Progress Check Note - Albina Jiménez 30 y o  female MRN: [id number]    Unit/Bed#: L&D 326-01 Encounter: [id number]    Dose (marii-units/min) Oxytocin: 8 marii-units/min  Contraction Frequency (minutes): 2-3 5  Contraction Quality: Mild  Tachysystole: No   Cervical Dilation: 6        Cervical Effacement: 80  Fetal Station: -2  Baseline Rate: 125 bpm  Fetal Heart Rate: 130 BPM  FHR Category: Category I               Vital Signs:     Vitals:    21   BP: 142/72   Pulse: 78   Resp:    Temp:    SpO2:            Notes/comments:     Pt is having some early decelerations with moderate LTV   Recommend to change position   She is progressing in labor and the fetal head came down nicely   She does have a prominent pubic arch but a lot of room posterior   Expect         Lavetta Nageotte, MD 3/3/2021 8:08 PM

## 2021-03-04 NOTE — ANESTHESIA POSTPROCEDURE EVALUATION
Post-Op Assessment Note    CV Status:  Stable    Pain management: adequate     Mental Status:  Alert and awake   Hydration Status:  Euvolemic   PONV Controlled:  Controlled   Airway Patency:  Patent      Post Op Vitals Reviewed: Yes      Staff: Anesthesiologist     Post-op block assessment: no complications and catheter intact      No complications documented      BP      Temp      Pulse     Resp      SpO2

## 2021-03-04 NOTE — CASE MANAGEMENT
CM consult for upgraded breast pump to Spectra S1  Submitted order in [facility] to [facility]  Received response from [facility] at [facility] that she made contact with patient and collected out of pocket cost  [facility] requested CM deliver breast pump but they are not in stock here at hospital  Met with patient and confirmed that [employer] has processed the pump but that it will have to be shipped to their home  Patient is requesting pump ship immediately  Sent follow up message in [facility] to [facility] alerting her of conversation with patient and requested they ship pump immediately  No other CM needs

## 2021-03-04 NOTE — PROGRESS NOTES
Pt was turned from side to side   She is having regular contraction q 1-3 min  The pitocin was 1/2  She having early decelerations  Cont change in position and watch for fetal distress       Close observation

## 2021-03-04 NOTE — ANESTHESIA PROCEDURE NOTES
Epidural Block    Patient location during procedure: OB  Start time: 3/3/2021 7:10 PM  Reason for block: at surgeon's request and primary anesthetic  Staffing  Anesthesiologist: Damon Rodney DO  Performed: anesthesiologist   Preanesthetic Checklist  Completed: patient identified, surgical consent, pre-op evaluation, timeout performed, IV checked, risks and benefits discussed and monitors and equipment checked  Epidural  Patient position: sitting  Prep: Betadine  Patient monitoring: frequent blood pressure checks  Approach: midline  Location: lumbar (1-5)  Injection technique: CHRIS air  Needle  Needle type: Tuohy   Needle gauge: 18 G  Catheter type: side hole  Catheter size: 20 G  Catheter at skin depth: 10 cm  Test dose: negativelidocaine 1 5% with epinephrine 1:200,000 test dose, 3 mL

## 2021-03-04 NOTE — PLAN OF CARE
Problem: Knowledge Deficit  Goal: Verbalizes understanding of labor plan  Description: Assess patient/family/caregiver's baseline knowledge level and ability to understand information  Provide education via patient/family/caregiver's preferred learning method at appropriate level of understanding  1  Provide teaching at level of understanding  2  Provide teaching via preferred learning method(s)  Outcome: Progressing  Goal: Patient/family/caregiver demonstrates understanding of disease process, treatment plan, medications, and discharge instructions  Description: Complete learning assessment and assess knowledge base  Interventions:  - Provide teaching at level of understanding  - Provide teaching via preferred learning methods  Outcome: Progressing     Problem: Labor & Delivery  Goal: Manages discomfort  Description: Assess and monitor for signs and symptoms of discomfort  Assess patient's pain level regularly and per hospital policy  Administer medications as ordered  Support use of nonpharmacological methods to help control pain such as distraction, imagery, relaxation, and application of heat and cold  Collaborate with interdisciplinary team and patient to determine appropriate pain management plan  1  Include patient in decisions related to comfort  2  Offer non-pharmacological pain management interventions  3  Report ineffective pain management to physician  Outcome: Progressing  Goal: Patient vital signs are stable  Description: 1  Assess vital signs - vaginal delivery    Outcome: Progressing     Problem: PAIN - ADULT  Goal: Verbalizes/displays adequate comfort level or baseline comfort level  Description: Interventions:  - Encourage patient to monitor pain and request assistance  - Assess pain using appropriate pain scale  - Administer analgesics based on type and severity of pain and evaluate response  - Implement non-pharmacological measures as appropriate and evaluate response  - Consider cultural and social influences on pain and pain management  - Notify physician/advanced practitioner if interventions unsuccessful or patient reports new pain  Outcome: Progressing     Problem: INFECTION - ADULT  Goal: Absence or prevention of progression during hospitalization  Description: INTERVENTIONS:  - Assess and monitor for signs and symptoms of infection  - Monitor lab/diagnostic results  - Monitor all insertion sites, i e  indwelling lines, tubes, and drains  - Monitor endotracheal if appropriate and nasal secretions for changes in amount and color  - [place] appropriate cooling/warming therapies per order  - Administer medications as ordered  - Instruct and encourage patient and family to use good hand hygiene technique  - Identify and instruct in appropriate isolation precautions for identified infection/condition  Outcome: Progressing  Goal: Absence of fever/infection during neutropenic period  Description: INTERVENTIONS:  - Monitor WBC    Outcome: Progressing     Problem: SAFETY ADULT  Goal: Patient will remain free of falls  Description: INTERVENTIONS:  - Assess patient frequently for physical needs  -  Identify cognitive and physical deficits and behaviors that affect risk of falls    -  [place] fall precautions as indicated by assessment   - Educate patient/family on patient safety including physical limitations  - Instruct patient to call for assistance with activity based on assessment  - Modify environment to reduce risk of injury  - Consider OT/PT consult to assist with strengthening/mobility  Outcome: Progressing  Goal: Maintain or return to baseline ADL function  Description: INTERVENTIONS:  -  Assess patient's ability to carry out ADLs; assess patient's baseline for ADL function and identify physical deficits which impact ability to perform ADLs (bathing, care of mouth/teeth, toileting, grooming, dressing, etc )  - Assess/evaluate cause of self-care deficits   - Assess range of motion  - Assess patient's mobility; develop plan if impaired  - Assess patient's need for assistive devices and provide as appropriate  - Encourage maximum independence but intervene and supervise when necessary  - Involve family in performance of ADLs  - Assess for home care needs following discharge   - Consider OT consult to assist with ADL evaluation and planning for discharge  - Provide patient education as appropriate  Outcome: Progressing  Goal: Maintain or return mobility status to optimal level  Description: INTERVENTIONS:  - Assess patient's baseline mobility status (ambulation, transfers, stairs, etc )    - Identify cognitive and physical deficits and behaviors that affect mobility  - Identify mobility aids required to assist with transfers and/or ambulation (gait belt, sit-to-stand, lift, walker, cane, etc )  - [place] fall precautions as indicated by assessment  - Record patient progress and toleration of activity level on Mobility SBAR; progress patient to next Phase/Stage  - Instruct patient to call for assistance with activity based on assessment  - Consider rehabilitation consult to assist with strengthening/weightbearing, etc   Outcome: Progressing     Problem: DISCHARGE PLANNING  Goal: Discharge to home or other facility with appropriate resources  Description: INTERVENTIONS:  - Identify barriers to discharge w/patient and caregiver  - Arrange for needed discharge resources and transportation as appropriate  - Identify discharge learning needs (meds, wound care, etc )  - Arrange for interpretive services to assist at discharge as needed  - Refer to Case Management Department for coordinating discharge planning if the patient needs post-hospital services based on physician/advanced practitioner order or complex needs related to functional status, cognitive ability, or social support system  Outcome: Progressing

## 2021-03-04 NOTE — OB LABOR/OXYTOCIN SAFETY PROGRESS
Oxytocin Safety Progress Check Note - Meryle Knuckles 30 y o  female MRN: [id number]    Unit/Bed#: L&D 326-01 Encounter: [id number]    Dose (marii-units/min) Oxytocin: 4 marii-units/min(per Dr [place] Izaguirre)  Contraction Frequency (minutes): 1 5-3  Contraction Quality: Mild  Tachysystole: No   Cervical Dilation: 10  Dilation Complete Date: 03/03/21  Dilation Complete Time: 2117  Cervical Effacement: 100  Fetal Station: 2  Baseline Rate: 125 bpm  Fetal Heart Rate: 130 BPM  FHR Category: Category I               Vital Signs:   /79   Pulse 74   Temp 97 7 °F (36 5 °C) (Oral)   Resp 18   Ht 5' 4" (1 626 m)   Wt 86 2 kg (190 lb)   LMP 06/17/2020 (Exact Date)   SpO2 95%   BMI 32 61 kg/m²     Vitals:    03/03/21 2050   BP: 145/79   Pulse: 74   Resp:    Temp:    SpO2:            Notes/comments:   Pt is fully dilated will start to push        Keyon Dias MD 3/3/2021 9:17 PM

## 2021-03-04 NOTE — DISCHARGE SUMMARY
Discharge Summary - OB/GYN   Zeenat Martinez 30 y o  female MRN: [id number]  Unit/Bed#: L&D 326-01 Encounter: [id number]      Admission Date: 3/3/2021     Discharge Date: 3/05/2021    Admitting Diagnosis:   1  Pregnancy at 37w0d  2  Preeclampsia without severe features   3  GBS bacteriuria  4  Anxiety     Discharge Diagnosis:   Same, delivered    Procedures: spontaneous vaginal delivery    Admitting and Delivery Attending: Justice Disla MD  Discharge Attending: Dr Meron Ibrahim Course:     Zeenat Martinez is a 30 y o   at 37w0d wks who was initially admitted for an induction of labor for preeclampsia without severe features  On presentation, her cervix was 2/50/-4  She received one dose of Cytotec for cervical ripening and was ultimately started on a pitocin titration  She received an epidural for analgesia and was artificially ruptured for clear fluids  She progressed to complete cervical dilation prior to pushing  She delivered a viable female  on 3/3/21 at 2302  Weight 6lbs 11 8oz via spontaneous vaginal delivery  Apgars were 8 (1 min) and 9 (5 min)   stayed in the delivery room after birth  Patient tolerated the procedure well and was transferred to recovery in stable condition  Her post-partum course was uncomplicated  Her blood pressures remained well-controlled  Her post-partum pain was well controlled with oral analgesics  On day of discharge, she was ambulating and able to reasonably perform all ADLs  She was voiding and had appropriate bowel function  Pain was well controlled  She was discharged home on post-partum day #2 without complications  Patient was instructed to follow up with her OB as an outpatient and was given appropriate warnings to call provider if she develops signs of infection or uncontrolled pain      Complications: none apparent    Condition at discharge: good     Discharge instructions/Information to patient and family:   See after visit summary for information provided to patient and family  Provisions for Follow-Up Care:  See after visit summary for information related to follow-up care and any pertinent home health orders  Disposition: Home    Planned Readmission: No    Discharge Medications: For a complete list of the patient's medications, please refer to her med rec

## 2021-03-04 NOTE — L&D DELIVERY NOTE
DELIVERY NOTE  Cain Zhou 30 y o  female MRN: [id number]  Unit/Bed#: L&D 326-01 Encounter: [id number]    Obstetrician:   Jenn    Assistant:     Pre-Delivery Diagnosis: Term pregnancy  Single fetus  Preeclampsia  without severe features    Post-Delivery Diagnosis: Same as above - Delivered  Viable female fetus  1st degree laceration  Labial tear    Procedure: Spontaneous vaginal delivery    Delivery Date and Time: 3/3/21 @ 1102 pm     Estimated Blood Loss:  QBL pending            Complications:  None    Brief description of labor:  Please see additional notes for details of the labor course  Description of Procedure: With maternal expulsive efforts, the patient delivered a viable female   The position at delivery was OP  The fetal vertex delivered spontaneously  There was no nuchal cord  The anterior shoulder delivered atraumatically with maternal expulsive forces and the assistance of gentle downward traction  The posterior shoulder delivered with maternal expulsive forces and the assistance of gentle upward traction  The remainder of the fetus delivered spontaneously  Upon delivery, the  was placed on the mother's abdomen and the umbilical cord was clamped and cut  The  resuscitator evaluated the  at bedside  Arterial and venous cord blood gases and cord blood were collected for analysis  These were sent to the lab  Active management of the third stage of labor included uterine massage and administration of IV Pitocin at 250 chriss-units per minute  The uterus was noted to contract down well  The placenta delivered spontaneously and was noted to have a centrally-inserted 3-vessel cord  It was sent for storage  The vagina, cervix, perineum, and rectum were inspected and there was noted to be 1st degree laceration with a labial laceration     Laceration Repair  Patient was comfortable with epidural at that time   A 1st degree and labial was identified and required repair  The laceration was repaired with 2 Vicryl rapid  The repair inspected and found to have good tissue reapproximation and hemostasis  Hemostasis was confirmed at the conclusion of this procedure  At the conclusion of the delivery, all needle, sponge, and instrument counts were noted to be correct  The patient tolerated the procedure well and was allowed to recover in labor and delivery room  Dr Ozzie Ann MD was present

## 2021-03-04 NOTE — PROGRESS NOTES
Progress Note - OB/GYN   Vee Jordan 30 y o  female MRN: [id number]  Unit/Bed#: L&D 312-01 Encounter: [id number]    ASSESSMENT:  Vee Jordan is a 30 y o  Lizzie Garza female, PPD#1 s/p Spontaneous Vaginal Delivery at 37w0d  Recovering well  PLAN:  1  Post partum   - Continue routine post partum care  - Rh positive   - Pain control: PO meds on board   - DVT ppx: SCDs, ambulation  - Encourage ambulation  - Encourage breastfeeding  - Contraception plan:   - Vaccines: n/a   - Anticipate d/c PPD#2    2  Preeclampsia w/o SF   - BP's 120-140s/60-70s  - P/C 0 39, CBC/CMP WNL       SUBJECTIVE:  Pt feels well  She has no major complaints  Pain: some, responding to PO meds  Tolerating PO: yes  Voiding: yes  Flatus: yes  Bm: no  Ambulating: yes  Breastfeeding: yes  Chest pain: no  Shortness of breath: no  Leg pain: no  Lochia: WNL    OBJECTIVE:   Vitals:    03/04/21 0303   BP: 129/75   Pulse: 80   Resp: 18   Temp: 97 8 °F (36 6 °C)   SpO2: 96%     Physical Exam:   Body mass index is 32 61 kg/m²  Constitutional: Well-developed, well-nourished  NAD  HEENT: NC/AT  Conjunctivae normal    Cardiovascular: RRR, S1/S2 normal    Pulmonary: Normal respiratory effort  Lung sounds CTAB  Abdominal: Soft, non-distended, non-tender  Uterus firm below umbilicus  MSK: Normal range of motion  Extremities: non-tender  Neurological: Alert and oriented to person, place, and time  Skin: Skin is warm and dry       I/O       03/02 0701 - 03/03 0700 03/03 0701 - 03/04 0700    Urine (mL/kg/hr)  1150    Blood  282    Total Output  1432    Net  -1432              Lab Results   Component Value Date    WBC 7 55 03/03/2021    HGB 13 3 03/03/2021    HCT 38 6 03/03/2021    MCV 92 03/03/2021     (L) 03/03/2021       Kyra Rene MD  PGY-2, OB/GYN  3/4/2021 6:35 AM

## 2021-03-04 NOTE — PLAN OF CARE
Problem: Knowledge Deficit  Goal: Verbalizes understanding of labor plan  Description: Assess patient/family/caregiver's baseline knowledge level and ability to understand information  Provide education via patient/family/caregiver's preferred learning method at appropriate level of understanding  1  Provide teaching at level of understanding  2  Provide teaching via preferred learning method(s)  Outcome: Progressing  Goal: Patient/family/caregiver demonstrates understanding of disease process, treatment plan, medications, and discharge instructions  Description: Complete learning assessment and assess knowledge base  Interventions:  - Provide teaching at level of understanding  - Provide teaching via preferred learning methods  Outcome: Progressing     Problem: Labor & Delivery  Goal: Manages discomfort  Description: Assess and monitor for signs and symptoms of discomfort  Assess patient's pain level regularly and per hospital policy  Administer medications as ordered  Support use of nonpharmacological methods to help control pain such as distraction, imagery, relaxation, and application of heat and cold  Collaborate with interdisciplinary team and patient to determine appropriate pain management plan  1  Include patient in decisions related to comfort  2  Offer non-pharmacological pain management interventions  3  Report ineffective pain management to physician  Outcome: Progressing  Goal: Patient vital signs are stable  Description: 1  Assess vital signs - vaginal delivery    Outcome: Progressing     Problem: PAIN - ADULT  Goal: Verbalizes/displays adequate comfort level or baseline comfort level  Description: Interventions:  - Encourage patient to monitor pain and request assistance  - Assess pain using appropriate pain scale  - Administer analgesics based on type and severity of pain and evaluate response  - Implement non-pharmacological measures as appropriate and evaluate response  - Consider cultural and social influences on pain and pain management  - Notify physician/advanced practitioner if interventions unsuccessful or patient reports new pain  Outcome: Progressing     Problem: INFECTION - ADULT  Goal: Absence or prevention of progression during hospitalization  Description: INTERVENTIONS:  - Assess and monitor for signs and symptoms of infection  - Monitor lab/diagnostic results  - Monitor all insertion sites, i e  indwelling lines, tubes, and drains  - Monitor nasal secretions for changes in amount and color  - Administer medications as ordered  - Instruct and encourage patient and family to use good hand hygiene technique  - Identify and instruct in appropriate isolation precautions for identified infection/condition  Outcome: Progressing  Goal: Absence of fever/infection during neutropenic period  Description: INTERVENTIONS:  - Monitor WBC    Outcome: Progressing     Problem: SAFETY ADULT  Goal: Patient will remain free of falls  Description: INTERVENTIONS:  - Assess patient frequently for physical needs  -  Identify cognitive and physical deficits and behaviors that affect risk of falls    -  [place] fall precautions as indicated by assessment   - Educate patient/family on patient safety including physical limitations  - Instruct patient to call for assistance with activity based on assessment  - Modify environment to reduce risk of injury  - Consider OT/PT consult to assist with strengthening/mobility  Outcome: Progressing  Goal: Maintain or return to baseline ADL function  Description: INTERVENTIONS:  -  Assess patient's ability to carry out ADLs; assess patient's baseline for ADL function and identify physical deficits which impact ability to perform ADLs (bathing, care of mouth/teeth, toileting, grooming, dressing, etc )  - Assess/evaluate cause of self-care deficits   - Assess range of motion  - Assess patient's mobility; develop plan if impaired  - Assess patient's need for assistive devices and provide as appropriate  - Encourage maximum independence but intervene and supervise when necessary  - Involve family in performance of ADLs  - Assess for home care needs following discharge   - Consider OT consult to assist with ADL evaluation and planning for discharge  - Provide patient education as appropriate  Outcome: Progressing  Goal: Maintain or return mobility status to optimal level  Description: INTERVENTIONS:  - Assess patient's baseline mobility status (ambulation, transfers, stairs, etc )    - Identify cognitive and physical deficits and behaviors that affect mobility  - Identify mobility aids required to assist with transfers and/or ambulation (gait belt, sit-to-stand, lift, walker, cane, etc )  - [place] fall precautions as indicated by assessment  - Record patient progress and toleration of activity level on Mobility SBAR; progress patient to next Phase/Stage  - Instruct patient to call for assistance with activity based on assessment  - Consider rehabilitation consult to assist with strengthening/weightbearing, etc   Outcome: Progressing     Problem: DISCHARGE PLANNING  Goal: Discharge to home or other facility with appropriate resources  Description: INTERVENTIONS:  - Identify barriers to discharge w/patient and caregiver  - Arrange for needed discharge resources and transportation as appropriate  - Identify discharge learning needs (meds, wound care, etc )  - Arrange for interpretive services to assist at discharge as needed  - Refer to Case Management Department for coordinating discharge planning if the patient needs post-hospital services based on physician/advanced practitioner order or complex needs related to functional status, cognitive ability, or social support system  Outcome: Progressing

## 2021-03-05 VITALS
HEIGHT: 64 IN | DIASTOLIC BLOOD PRESSURE: 80 MMHG | WEIGHT: 190 LBS | OXYGEN SATURATION: 96 % | HEART RATE: 76 BPM | RESPIRATION RATE: 16 BRPM | BODY MASS INDEX: 32.44 KG/M2 | TEMPERATURE: 97.7 F | SYSTOLIC BLOOD PRESSURE: 138 MMHG

## 2021-03-05 PROCEDURE — 99024 POSTOP FOLLOW-UP VISIT: CPT | Performed by: OBSTETRICS & GYNECOLOGY

## 2021-03-05 RX ORDER — IBUPROFEN 600 MG/1
600 TABLET ORAL EVERY 6 HOURS PRN
Qty: 30 TABLET | Refills: 0
Start: 2021-03-05 | End: 2021-12-22

## 2021-03-05 RX ORDER — DOCUSATE SODIUM 100 MG/1
100 CAPSULE, LIQUID FILLED ORAL 2 TIMES DAILY
Qty: 30 CAPSULE | Refills: 0
Start: 2021-03-05 | End: 2021-12-22

## 2021-03-05 RX ORDER — ACETAMINOPHEN 325 MG/1
650 TABLET ORAL EVERY 4 HOURS PRN
Refills: 0
Start: 2021-03-05 | End: 2021-12-22

## 2021-03-05 RX ORDER — POLYETHYLENE GLYCOL 3350 17 G/17G
17 POWDER, FOR SOLUTION ORAL DAILY
Refills: 0
Start: 2021-03-05 | End: 2021-12-22

## 2021-03-05 RX ORDER — POLYETHYLENE GLYCOL 3350 17 G/17G
17 POWDER, FOR SOLUTION ORAL ONCE
Status: COMPLETED | OUTPATIENT
Start: 2021-03-05 | End: 2021-03-05

## 2021-03-05 RX ADMIN — ACETAMINOPHEN 650 MG: 325 TABLET, COATED ORAL at 22:12

## 2021-03-05 RX ADMIN — DOCUSATE SODIUM 100 MG: 100 CAPSULE, LIQUID FILLED ORAL at 17:30

## 2021-03-05 RX ADMIN — SERTRALINE HYDROCHLORIDE 100 MG: 100 TABLET ORAL at 08:58

## 2021-03-05 RX ADMIN — IBUPROFEN 600 MG: 600 TABLET ORAL at 22:12

## 2021-03-05 RX ADMIN — POLYETHYLENE GLYCOL 3350 17 G: 17 POWDER, FOR SOLUTION ORAL at 22:12

## 2021-03-05 RX ADMIN — IBUPROFEN 600 MG: 600 TABLET ORAL at 08:58

## 2021-03-05 RX ADMIN — DOCUSATE SODIUM 100 MG: 100 CAPSULE, LIQUID FILLED ORAL at 08:58

## 2021-03-05 NOTE — PROGRESS NOTES
Discharge instructions given and explained to pt  Pt  Azar Rouse understanding  Pt  Asked about prescriptions for Colace and Ibuprofen  Called Dr Karlos Gosselin and made aware  Dr  To send Rxs to [facility]  Pt going to board overnight in room due to baby being detained due to jaundice  Pt aware that after 11:59pm she will be officially discharged as a pt, and RN will not be able to administer meds to pt  Pt  Verbalized understanding   to  meds at [facility]

## 2021-03-05 NOTE — PLAN OF CARE
Problem: Knowledge Deficit  Goal: Verbalizes understanding of labor plan  Description: Assess patient/family/caregiver's baseline knowledge level and ability to understand information  Provide education via patient/family/caregiver's preferred learning method at appropriate level of understanding  1  Provide teaching at level of understanding  2  Provide teaching via preferred learning method(s)  Outcome: Completed  Goal: Patient/family/caregiver demonstrates understanding of disease process, treatment plan, medications, and discharge instructions  Description: Complete learning assessment and assess knowledge base  Interventions:  - Provide teaching at level of understanding  - Provide teaching via preferred learning methods  Outcome: Completed     Problem: Labor & Delivery  Goal: Manages discomfort  Description: Assess and monitor for signs and symptoms of discomfort  Assess patient's pain level regularly and per hospital policy  Administer medications as ordered  Support use of nonpharmacological methods to help control pain such as distraction, imagery, relaxation, and application of heat and cold  Collaborate with interdisciplinary team and patient to determine appropriate pain management plan  1  Include patient in decisions related to comfort  2  Offer non-pharmacological pain management interventions  3  Report ineffective pain management to physician  Outcome: Completed  Goal: Patient vital signs are stable  Description: 1  Assess vital signs - vaginal delivery    Outcome: Completed     Problem: PAIN - ADULT  Goal: Verbalizes/displays adequate comfort level or baseline comfort level  Description: Interventions:  - Encourage patient to monitor pain and request assistance  - Assess pain using appropriate pain scale  - Administer analgesics based on type and severity of pain and evaluate response  - Implement non-pharmacological measures as appropriate and evaluate response  - Consider cultural and social influences on pain and pain management  - Notify physician/advanced practitioner if interventions unsuccessful or patient reports new pain  3/4/2021 2030 by Cramen Goldstein RN  Outcome: Progressing  3/4/2021 2029 by Carmen Goldstein RN  Outcome: Progressing     Problem: INFECTION - ADULT  Goal: Absence or prevention of progression during hospitalization  Description: INTERVENTIONS:  - Assess and monitor for signs and symptoms of infection  - Monitor lab/diagnostic results  - Monitor all insertion sites, i e  indwelling lines, tubes, and drains  - Monitor nasal secretions for changes in amount and color  - Administer medications as ordered  - Instruct and encourage patient and family to use good hand hygiene technique  - Identify and instruct in appropriate isolation precautions for identified infection/condition  3/4/2021 2030 by Carmen Goldstein RN  Outcome: Progressing  3/4/2021 2029 by Carmen Goldstein RN  Outcome: Progressing  Goal: Absence of fever/infection during neutropenic period  Description: INTERVENTIONS:  - Monitor WBC    3/4/2021 2030 by Carmen Goldstein RN  Outcome: Progressing  3/4/2021 2029 by Carmen Goldstein RN  Outcome: Progressing     Problem: SAFETY ADULT  Goal: Patient will remain free of falls  Description: INTERVENTIONS:  - Assess patient frequently for physical needs  -  Identify cognitive and physical deficits and behaviors that affect risk of falls    -  [place] fall precautions as indicated by assessment   - Educate patient/family on patient safety including physical limitations  - Instruct patient to call for assistance with activity based on assessment  - Modify environment to reduce risk of injury  - Consider OT/PT consult to assist with strengthening/mobility  3/4/2021 2030 by Carmen Goldstein RN  Outcome: Progressing  3/4/2021 2029 by Carmen Goldstein RN  Outcome: Progressing  Goal: Maintain or return to baseline ADL function  Description: INTERVENTIONS:  -  Assess patient's ability to carry out ADLs; assess patient's baseline for ADL function and identify physical deficits which impact ability to perform ADLs (bathing, care of mouth/teeth, toileting, grooming, dressing, etc )  - Assess/evaluate cause of self-care deficits   - Assess range of motion  - Assess patient's mobility; develop plan if impaired  - Assess patient's need for assistive devices and provide as appropriate  - Encourage maximum independence but intervene and supervise when necessary  - Involve family in performance of ADLs  - Assess for home care needs following discharge   - Consider OT consult to assist with ADL evaluation and planning for discharge  - Provide patient education as appropriate  3/4/2021 2030 by Anselmo Blanchard RN  Outcome: Progressing  3/4/2021 2029 by Anselmo Blanchard RN  Outcome: Progressing  Goal: Maintain or return mobility status to optimal level  Description: INTERVENTIONS:  - Assess patient's baseline mobility status (ambulation, transfers, stairs, etc )    - Identify cognitive and physical deficits and behaviors that affect mobility  - Identify mobility aids required to assist with transfers and/or ambulation (gait belt, sit-to-stand, lift, walker, cane, etc )  - [place] fall precautions as indicated by assessment  - Record patient progress and toleration of activity level on Mobility SBAR; progress patient to next Phase/Stage  - Instruct patient to call for assistance with activity based on assessment  - Consider rehabilitation consult to assist with strengthening/weightbearing, etc   3/4/2021 2030 by Anselmo Blanchard RN  Outcome: Progressing  3/4/2021 2029 by Anselmo Blanchard RN  Outcome: Progressing     Problem: DISCHARGE PLANNING  Goal: Discharge to home or other facility with appropriate resources  Description: INTERVENTIONS:  - Identify barriers to discharge w/patient and caregiver  - Arrange for needed discharge resources and transportation as appropriate  - Identify discharge learning needs (meds, wound care, etc )  - Arrange for interpretive services to assist at discharge as needed  - Refer to Case Management Department for coordinating discharge planning if the patient needs post-hospital services based on physician/advanced practitioner order or complex needs related to functional status, cognitive ability, or social support system  3/4/2021 2030 by Sapphire Krishnamurthy RN  Outcome: Progressing  3/4/2021 2029 by Sapphire Krishnamurthy RN  Outcome: Progressing     Problem: POSTPARTUM  Goal: Experiences normal postpartum course  Description: INTERVENTIONS:  - Monitor maternal vital signs  - Assess uterine involution and lochia  Outcome: Progressing  Goal: Appropriate maternal -  bonding  Description: INTERVENTIONS:  - Identify family support  - Assess for appropriate maternal/infant bonding   -Encourage maternal/infant bonding opportunities  - Referral to  or  as needed  Outcome: Progressing  Goal: Establishment of infant feeding pattern  Description: INTERVENTIONS:  - Assess breast/bottle feeding  - Refer to lactation as needed  Outcome: Progressing  Goal: Incision(s), wounds(s) or drain site(s) healing without S/S of infection  Description: INTERVENTIONS  - Assess and document risk factors for skin impairment   - Assess and document dressing, incision, wound bed, drain sites and surrounding tissue  - Consider nutrition services referral as needed  - Oral mucous membranes remain intact  - Provide patient/ family education  Outcome: Progressing

## 2021-03-05 NOTE — PROGRESS NOTES
Progress Note - OB/GYN   Patty Shoemaker 30 y o  female MRN: [id number]  Unit/Bed#: L&D 312-01 Encounter: [id number]    ASSESSMENT:  Patty Shoemaker is a 30 y o  [place] Yefri female, PPD#2 s/p Spontaneous Vaginal Delivery at 37w0d  Recovering well  PLAN:  1  Post partum   - Continue routine post partum care  - Rh positive   - Pain control: PO meds on board   - DVT ppx: SCDs, ambulation  - Encourage ambulation  - Encourage breastfeeding  - Contraception plan:   - Vaccines: n/a   - Anticipate d/c PPD#2    2  Preeclampsia w/o SF   - BP's 120-140s/60-70s  - P/C 0 39, CBC/CMP WNL   - Asymptomatic  - Encourage taking BP's regularly at home and 1 week follow up in the office     SUBJECTIVE:  Pt feels well  She has no major complaints  Pain: controlled, responding to PO meds  Tolerating PO: yes  Voiding: yes  Flatus: yes  Bm: no  Ambulating: yes  Breastfeeding: yes  Chest pain: no  Shortness of breath: no  Leg pain: no  Lochia: WNL    OBJECTIVE:   Vitals:    03/04/21 2300   BP: 126/76   Pulse: 77   Resp: 16   Temp: 97 6 °F (36 4 °C)   SpO2:      Physical Exam:   Body mass index is 32 61 kg/m²  Constitutional: Well-developed, well-nourished  NAD  HEENT: NC/AT  Conjunctivae normal    Cardiovascular: RRR, S1/S2 normal    Pulmonary: Normal respiratory effort  Lung sounds CTAB  Abdominal: Soft, non-distended, non-tender  Uterus firm below umbilicus  MSK: Normal range of motion  Extremities: non-tender  Neurological: Alert and oriented to person, place, and time  Skin: Skin is warm and dry       I/O       03/02 0701 - 03/03 0700 03/03 0701 - 03/04 0700    Urine (mL/kg/hr)  1150    Blood  282    Total Output  1432    Net  -1432              Lab Results   Component Value Date    WBC 7 55 03/03/2021    HGB 13 3 03/03/2021    HCT 38 6 03/03/2021    MCV 92 03/03/2021     (L) 03/03/2021       Jolanta Mcdonough MD  PGY-2, OB/GYN  3/5/2021 7:18 AM

## 2021-03-05 NOTE — DISCHARGE INSTRUCTIONS
Self Care After Delivery   AMBULATORY CARE:   The postpartum period  is the period of time from delivery to about 6 weeks  During this time you may experience many physical and emotional changes  It is important to understand what is normal and when you need to call your healthcare provider  It is also important to know how to care for yourself during this time  Call your local emergency number (911 in the [address] [facility] [address]) for any of the following:   · You see or hear things that are not there, or have thoughts of harming yourself or your baby  · You soak through 1 pad in 15 minutes, have blurry vision, clammy or pale skin, and feel faint  · You faint or lose consciousness  · You have trouble breathing  · You cough up blood  · Your  incision comes apart  Seek care immediately if:   · Your heart is beating faster than usual     · You have a bad headache or changes in your vision  · Your episiotomy or  incision is red, swollen, bleeding, or draining pus  · You have severe abdominal pain  Call your doctor or obstetrician if:   · Your leg is painful, red, and larger than usual     · You soak through 1 or more pads in an hour, or pass blood clots larger than a quarter from your vagina  · You have a fever  · You have new or worsening pain in your abdomen or vagina  · You continue to have depression 1 to 2 weeks after you deliver  · You have trouble sleeping  · You have foul-smelling discharge from your vagina  · You have pain or burning when you urinate  · You do not have a bowel movement for 3 days or more  · You have nausea or are vomiting  · You have hard lumps or red streaks over your breasts  · You have cracked nipples or bleed from your nipples  · You have questions or concerns about your condition or care  Physical changes:   The following are normal changes after you give birth:  · Pain in the area between your anus and vagina    · Breast pain    · Constipation or hemorrhoids    · Hot or cold flashes    · Vaginal bleeding or discharge    · Mild to moderate abdominal cramping    · Difficulty controlling bowel movements or urine    Emotional changes:  A drop in hormone levels after you deliver may cause changes in your emotions  You may feel irritable, sad, or anxious  You may cry easily or for no reason  You may also feel depressed  Depression that continues can be a sign of postpartum depression, a condition that can be treated  Treatment may include talk therapy, medicines, or both  Healthcare providers will ask how you are feeling and if you have any depression  These talks can happen during appointments for your medical care and for your baby's care, such as well child visits  Providers can help you find ways to care for yourself and your baby  Talk to your providers about the following:  · When emotional changes or depression started, and if it is getting worse over time    · Problems you are having with daily activities, sleep, or caring for your baby    · If anything makes you feel worse, or makes you feel better    · Feeling that you are not bonding with your baby the way you want    · Any problems your baby has with sleeping or feeding    · Your baby is fussy or cries a lot    · Support you have from friends, family, or others    Breast care for breastfeeding mothers: You may have sore breasts for 3 to 6 days after you give birth  This happens as your milk begins to fill your breasts  You may also have sore breasts if you do not breastfeed frequently  Do the following to care for your breasts:  · Apply a moist, warm, compress to your breast as directed  This may help soothe your breasts  Make sure the washcloth is not too hot before you apply it to your breast     · Nurse your baby or pump your milk frequently  This may prevent clogged milk ducts  Ask your healthcare provider how often to nurse or pump      · Massage your breasts as directed  This may help increase your milk flow  Gently rub your breasts in a circular motion before you breastfeed  You may need to gently squeeze your breast or nipple to help release milk  You can also use a breast pump to help release milk from your breast     · Wash your breasts with warm water only  Do not put soap on your nipples  Soap may cause your nipples to become dry  · Apply lanolin cream to your nipples as directed  Lanolin cream may add moisture to your skin and prevent nipple dryness  Always  wash off lanolin cream with warm water before you breastfeed  · Place pads in your bra  Your nipples may leak milk when you are not breastfeeding  You can place pads inside of your bra to help prevent leaking onto your clothing  Ask your healthcare provider where to purchase bra pads  · Get breastfeeding support if needed  Healthcare providers can answer questions about breastfeeding and provide you with support  Ask your healthcare provider who you can contact if you need breastfeeding support  Breast care for non-breastfeeding mothers:  Milk will fill your breasts even if you bottle feed your baby  Do the following to help stop your milk from filling your breasts and causing pain:  · Wear a bra with support at all times  A sports bra or a tight-fitting bra will help stop your milk from coming in  · Apply ice on each breast for 15 to 20 minutes every hour or as directed  Use an ice pack, or put crushed ice in a plastic bag  Cover it with a towel before you apply it to your breast  Ice helps your milk ducts shrink  · Keep your breasts away from warm water  Warm water will make it easier for milk to fill your breasts  Stand with your breasts away from warm water in the shower  · Limit how much you touch your breasts  This will prevent them from filling with milk  Perineum care: Your perineum is the area between your rectum and vagina   It is normal to have swelling and pain in this area after you give birth  If you had an episiotomy, your healthcare provider may give you special instructions  · Clean your perineum after you use the bathroom  This may prevent infection and help with healing  Use a spray bottle with warm water to clean your perineum  You may also gently spray warm water against your perineum when you urinate  Always wipe front to back  · Take a sitz bath as directed  A sitz bath may help relieve swelling and pain  Fill your bath tub or bucket with water up to your hips and sit in the water  Use cold water for 2 days after you deliver  Then use warm water  Ask your healthcare provider for more information about a sitz bath  · Apply ice packs for the first 24 hours or as directed  Use a plastic glove filled with ice or buy an ice pack  Wrap the ice pack or plastic glove in a small towel or wash cloth  Place the ice pack on your perineum for 20 minutes at a time  · Sit on a donut-shaped pillow  This may relieve pressure on your perineum when you sit  · Use wipes that contain medicine or take pills as directed  Your healthcare provider may tell you to use witch hazel pads  You can place witch hazel pads in the refrigerator before you apply them to your perineum  Your provider may also tell you to take NSAIDs  Ask him or her how often to take pills or use the wipes  · Do not go swimming or take tub baths for 4 to 6 weeks or as directed  This will help prevent an infection in your vagina or uterus  Bowel and bladder care: It may take 3 to 5 days to have a bowel movement after you deliver your baby  You can do the following to prevent or manage constipation, and get control of your bowel or bladder:  · Take stool softeners as directed  A stool softener is medicine that will make your bowel movements softer  This may prevent or relieve constipation  A stool softener may also make bowel movements less painful  · Drink plenty of liquids    Ask how much liquid to drink each day and which liquids are best for you  Liquids may help prevent constipation  · Eat foods high in fiber  Examples include fruits, vegetables, grains, beans, and lentils  Ask your healthcare provider how much fiber you need each day  Fiber may prevent constipation  · Do Kegel exercises as directed  Kegel exercises will help strengthen the muscles that control bowel movements and urination  Ask your healthcare provider for more information on Kegel exercises  · Apply cold compresses or medicine to hemorrhoids as directed  This may relieve swelling and pain  Your healthcare provider may tell you to apply ice or wipes that contain medicine to your hemorrhoids  He or she may also tell you to use a sitz bath  Ask your provider for more information on how to manage hemorrhoids  Nutrition:  Good nutrition is important in the postpartum period  It will help you return to a healthy weight, increase your energy levels, and prevent constipation  It will also help you get enough nutrients and calories if you are going to breastfeed your baby  · Eat a variety of healthy foods  Healthy foods include fruits, vegetables, whole-grain breads, low-fat dairy products, beans, lean meats, and fish  You may need 500 to 700 extra calories each day if you breastfeed your baby  You may also need extra protein  · Limit foods with added sugar and high amounts of fat  These foods are high in calories and low in healthy nutrients  Read food labels so you know how much sugar and fat is in the food you want to eat  · Drink 8 to 10 glasses of water per day  Water will help you make plenty of milk for your baby  It will also help prevent constipation  Drink a glass of water every time you breastfeed your baby  · Take vitamins as directed  Ask your healthcare provider what vitamins you need  · Limit caffeine and alcohol if you are breastfeeding    Caffeine and alcohol can get into your breast milk  Caffeine and alcohol can make your baby fussy  They can also interfere with your baby's sleep  Ask your healthcare provider if you can drink alcohol or caffeine  Rest and sleep: You may feel very tired in the postpartum period  Enough sleep will help you heal and give you energy to care for your baby  The following may help you get sleep and rest:  · Nap when your baby naps  Your baby may nap several times during the day  Get rest during this time  · Limit visitors  Many people may want to see you and your baby  Ask friends or family to visit on different days  This will give you time to rest     · Do not plan too much for one day  Put off household chores so that you have time to rest  Gradually do more each day  · Ask for help from family, friends, or neighbors  Ask them to help you with laundry, cleaning, or errands  Also ask someone to watch the baby while you take a nap or relax  Ask your partner to help with the care of your baby  Pump some of your breast milk so your partner can feed your baby during the night  Exercise after delivery:  Wait until your healthcare provider says it is okay to exercise  Exercise can help you lose weight, increase your energy levels, and manage your mood  It can also prevent constipation and blood clots  Start with gentle exercises such as walking  Do more as you have more energy  You may need to avoid abdominal exercises for 1 to 2 weeks after you deliver  Talk to your healthcare provider about an exercise plan that is right for you  Sexual activity after delivery:   · Do not have sex until your healthcare provider says it is okay  You may need to wait 4 to 6 weeks before you have sex  This may prevent infection and allow time to heal     · Your menstrual cycle may begin as soon as 3 weeks after you deliver  Your period may be delayed if you breastfeed your baby  You can become pregnant before you get your first postpartum period   Talk to your healthcare provider about birth control that is right for you  Some types of birth control are not safe during breastfeeding  For support and more information:  Join a support group for new mothers  Ask for help from family and friends with chores, errands, and care of your baby  · Office of Women's Health,  Department of Health and Human Services  [address], [address] [facility] [address]  [address], [address] [facility] [address]  Phone: [phone]  Web Address: www womenshealth gov  · March of [facility] [address] [facility] , [address]  [address] [facility] , [address] [facility]  Web Address: [email or web address]  Follow up with your doctor or obstetrician as directed: You will need to follow up within 2 to 6 weeks of delivery  Write down your questions so you remember to ask them at your visits  © Copyright [facility] [address] Information is for End User's use only and may not be sold, redistributed or otherwise used for commercial purposes  All illustrations and images included in CareNotes® are the copyrighted property of A D A M , Inc  or [facility]  The above information is an  only  It is not intended as medical advice for individual conditions or treatments  Talk to your doctor, nurse or pharmacist before following any medical regimen to see if it is safe and effective for you  COVID-19 (Coronavirus Disease 2019)   WHAT YOU NEED TO KNOW:   What do I need to know about coronavirus disease 2019 (COVID-19)? COVID-19 is the disease caused by the novel (new) coronavirus first discovered in December 2019  Coronaviruses generally cause upper respiratory (nose, throat, and lung) infections, such as a cold  The new virus can also cause serious lower respiratory conditions, such as pneumonia or acute respiratory distress syndrome (ARDS)   Anyone can develop serious problems from the new virus, but your risk is higher if you are 65 or older  A weak immune system, diabetes, or a heart or lung condition can also increase your risk  What are the signs and symptoms of COVID-19? You may not develop any signs or symptoms  Signs and symptoms that do develop usually start about 5 days after infection but can take 2 to 14 days  Signs and symptoms range from mild to severe  You may feel like you have the flu or a bad cold  Information on COVID-19 is still being learned  Tell your healthcare provider if you think you were infected but develop signs or symptoms not listed below:  · A cough    · Shortness of breath or trouble breathing that may become severe    · A fever of at least 100 4°F, or 38°C (may be lower in adults 65 or older)    · Chills that might include shaking    · Muscle pain, body aches, or a headache    · A sore throat    · Suddenly not being able to taste or smell anything    · Feeling mentally and physically tired (fatigue)    · Congestion (stuffy head and nose), or a runny nose    · Diarrhea, nausea, or vomiting    How is COVID-19 diagnosed? If you think you have COVID-19, call your healthcare provider  In some areas, testing is only done if a person has severe symptoms or is hospitalized  Testing is done more widely in other places  Your provider will tell you what to do based on your symptoms and the rules in your area  In general, the following may be used:  · A viral test  shows if you have a current infection  Samples are taken from your nose and throat, usually with swabs  You may need to wait several days to get the test results  Your healthcare provider will tell you how to get your results  You will need to quarantine (stay physically away from others) until you get your results  If results show you have COVID-19, you will need to quarantine until you are well  Your provider or other health official may give you more directions   You will also need to prevent another infection until it is known if you can get COVID-19 again  · An antibody test  shows if you had a past infection  Blood samples are used for this test  Antibodies are made by your immune system to attack the virus that causes COVID-19  Antibodies will form 1 to 3 weeks after you are infected  It is not known if antibodies prevent a second infection, or for how long a person might be protected  If you have antibodies, you will still need to be careful around others until more is known  · CT scans or x-rays  may be used to check for signs of pneumonia  The 2019 coronavirus causes a specific kind of pneumonia, usually in both lungs  How is COVID-19 treated? No medicine or specific treatment is currently approved for COVID-19  The following may be used to manage your symptoms or treat the effects of COVID-19:  · Mild symptoms  may get better on their own  If you do not need to be treated in a hospital, you will be given instructions to use at home  Your condition will be closely monitored  You will need to watch for worsening symptoms and seek immediate care if needed  Talk to your healthcare provider about the following:    ? Relieve your symptoms  To soothe a sore throat, gargle with warm salt water, or use throat lozenges or a throat spray  Your healthcare provider may recommend a cough medicine  Drink more liquids to thin and loosen mucus and to prevent dehydration  Use decongestants or saline drops as directed for nasal congestion  ? NSAIDs or acetaminophen  can help lower a fever and relieve body aches or a headache  Follow directions  If not taken correctly, NSAIDs can cause kidney damage and acetaminophen can cause liver damage  · Severe or life-threatening symptoms  are treated in the hospital  You may need a combination of the following:    ? Medicines  may be given to reduce inflammation or to fight the virus  You may also need blood thinners to prevent or treat blood clots  If you have a deep vein thrombosis (DVT) or pulmonary embolism (PE), you may need to keep using blood thinners for 3 months  ? Extra oxygen  may be given if you have respiratory failure  This means your lungs cannot get enough oxygen into your blood and out to your organs  Extra oxygen can help prevent organ failure  ? A ventilator  may be used to help you breathe  ? Convalescent plasma (part of blood)  from a patient who has recovered from COVID-19 may be used  The plasma contains antibodies that can help your body fight the infection  Convalescent plasma is only given to patients who have severe signs and symptoms  How does the 2019 coronavirus spread? The virus spreads quickly and easily  You can become infected if you are in contact with a large amount of the virus, even for a short time  You can also become infected by being around a small amount of virus for a long time  The following are ways the virus is thought to spread, but more information may be coming:  · Droplets are the most common way all coronaviruses spread  The virus can travel in droplets that form when a person talks, coughs, or sneezes  Anyone who breathes in the droplets or gets them in his or her eyes can become infected with the virus  Close personal contact with an infected person is thought to be the main way the virus spreads  Close personal contact means you are within 6 feet (2 meters) of the person  · Person-to-person contact can spread the virus  For example, a person with the virus on his or her hands can spread it by shaking hands with someone  At this time, it does not appear that the virus can be passed to a baby during pregnancy or delivery  The baby can be infected after he or she is born through person-to-person contact  The virus also does not appear to spread in breast milk  If you are pregnant or breastfeeding, talk to your healthcare provider or obstetrician about any concerns you have      · The virus can stay on objects and surfaces  A person can get the virus on his or her hands by touching the object or surface  Infection happens if the person then touches his or her eyes or mouth with unwashed hands  It is not yet known how long the virus can stay on an object or surface  That is why it is important to clean all surfaces that are used regularly  · An infected animal may be able to infect a person who touches it  This may happen at live markets or on a farm  How can everyone lower the risk for COVID-19? The best way to prevent infection is to avoid anyone who is infected, but this can be hard to do  An infected person can spread the virus before signs or symptoms begin, or even if signs or symptoms never develop  The following can help lower the risk for infection:      · Wash your hands often throughout the day  Use soap and water  Rub your soapy hands together, lacing your fingers  Wash the front and back of each hand, and in between your fingers  Use the fingers of one hand to scrub under the fingernails of the other hand  Wash for at least 20 seconds  Rinse with warm, running water for several seconds  Then dry your hands with a clean towel or paper towel  Use hand  that contains alcohol if soap and water are not available  Do not touch your eyes, nose, or mouth without washing your hands first  Teach children how to wash their hands and use hand   · Cover a sneeze or cough  This prevents droplets from traveling from you to others  Turn your face away and cover your mouth and nose with a tissue  Throw the tissue away  Use the bend of your arm if a tissue is not available  Then wash your hands well with soap and water or use hand   Turn and cover your face if you are around someone who is sneezing or coughing  Teach children how to cover a cough or sneeze  · Follow worldwide, national, and local social distancing guidelines    Social distancing means people avoid close physical contact so the virus cannot spread from one person to another  Keep at least 6 feet (2 meters) between you and others  Also keep this distance from anyone who comes to your home, such as someone making a delivery  · Make a habit of not touching your face  It is not known how long the virus can stay on objects and surfaces  If you get the virus on your hands, you can transfer it to your eyes, nose, or mouth and become infected  You can also transfer it to objects, surfaces, or people  Be aware of what you touch when you go out  Examples include handrails and elevator buttons  Try not to touch anything with bare hands unless it is necessary  Wash your hands before you leave your home and when you return  · Clean and disinfect high-touch surfaces and objects often  Use a disinfecting solution or wipes  You can make a solution by diluting 4 teaspoons of bleach in 1 quart (4 cups) of water  Clean and disinfect even if you think no one living in or coming to your home is infected with the virus  You can wipe items with a disinfecting cloth before you bring them into your home  Wash your hands after you handle anything you bring into your home  · Make your immune system as healthy as possible  A weakened immune system makes you more vulnerable to the new coronavirus  No COVID-19 vaccine is available yet  Vaccines such as the flu and pneumonia vaccines can help your immune system  Your healthcare provider can tell you which vaccines to get, and when to get them  Keep your immune system as strong as possible  Do not smoke  Eat healthy foods, exercise regularly, and try to manage stress  Go to bed and wake up at the same times each day  How do I follow social distancing guidelines to help lower the risk for COVID-19? National and local social distancing rules vary  Rules may change over time as restrictions are lifted  Restrictions may return if an outbreak happens where you live   It is important to know and follow all current social distancing rules in your area  The following are general guidelines:  · Limit trips out of your home  You may be able to have food, medicines, and other supplies delivered  If possible, have delivered items left at your door or other area  Try not to have someone hand you an item  You will be so close to the person that the virus can spread between you  · Do not have close physical contact with anyone who does not live in your home  Do not shake hands with, hug, or kiss a person as a greeting  Stand or walk as far from others as possible  If you must use public transportation (such as a bus or subway), try to sit or stand away from others  You can stay safely connected with others through phone calls, e-mail messages, social media websites, and video chats  Check in on anyone who may be having a hard time socially distancing, or who lives alone  Ask administrators at nursing homes or long-term care facilities how you can safely communicate with someone living there  · Wear a cloth face covering around others who do not live in your home  Face coverings help prevent the virus from spreading to others in droplets  You can use a clear face covering if someone needs to read your lips  This is a cloth covering that has plastic over the mouth area so your lips can be seen  Do not use coverings that have breathing valves or vents  The virus can travel out of the valve or vent and be spread to others  Do not take your covering off to talk, cough, or sneeze  Do not use coverings on children younger than 2 years or on anyone who has breathing problems or cannot remove it  · Only allow medical or other necessary professionals into your home  Wear your face covering, and remind professionals to wear a face covering  Remind them to wash their hands when they arrive and before they leave   Do not  let anyone who does not live in your home in, even if the person is not sick  A person can pass the virus to others before symptoms of COVID-19 begin  Some people never even develop symptoms  Children commonly have mild symptoms or no symptoms  It may be hard to tell a child not to hug or kiss you  Explain that this is how he or she can help you stay healthy  · Do not go to someone else's home unless it is necessary  Do not go over to visit, even if the person is lonely  Only go if you need to help him or her  Make sure you both wear face coverings while you are there  · Avoid large gatherings and crowds  Gatherings or crowds of 10 or more individuals can cause the virus to spread  Examples of gatherings include parties, sporting events, Advent services, and conferences  Crowds may form at beaches, hernandez, and tourist attractions  Protect yourself by staying away from large gatherings and crowds  · Ask your healthcare provider for other ways to have appointments  You may be able to have appointments without having to go into the provider's office  Some providers offer phone, video, or other types of appointments  You may also be able to get prescriptions for a few months of your medicines at a time  · Stay safe if you must go out to work  You may have a job that can only be done outside your home  Keep physical distance between you and other workers as much as possible  Follow your employer's rules so everyone stays safe  What should I do if I have COVID-19 and am recovering at home? Healthcare providers will give you specific instructions to follow  The following are general guidelines to remind you how to keep others safe until you are well:  · Wash your hands often  Use soap and water as much as possible  You can use hand  that contains alcohol if soap and water are not available  Do not share towels with anyone  If you use paper towels, throw them away in a lined trash can kept in your room or area  Use a covered trash can, if possible      · Do not go out of your home unless it is necessary  You may have to go to your healthcare provider's office for check-ups or to get prescription refills  Do not arrive at the provider's office without an appointment  Providers have to make their offices safe for staff and other patients  · Do not have close physical contact with anyone unless it is necessary  Only have close physical contact with a person giving direct care, or a baby or child you must care for  Family members and friends should not visit you  If possible, stay in a separate area or room of your home if you live with others  No one should go into the area or room except to give you care  You can visit with others by phone, video chat, e-mail, or similar systems  It is important to stay connected with others in your life while you recover  · Wear a face covering while others are near you  This can help prevent droplets from spreading the virus when you talk, sneeze, or cough  Put the covering on before anyone comes into your room or area  Remind the person to cover his or her nose and mouth before going in to provide care for you  · Do not share items  Do not share dishes, towels, or other items with anyone  Items need to be washed after you use them  · Protect your baby  Wash your hands with soap and water often throughout the day  Wear a clean face covering while you breastfeed, or while you express or pump breast milk  If possible, ask someone who is well to care for your baby  You can put breast milk in bottles for the person to use, if needed  Talk to your healthcare provider if you have any questions or concerns about caring for or bonding with your baby  He or she will tell you when to bring your baby in for check-ups and vaccines  He or she will also tell you what to do if you think your baby was infected with the new virus  · Do not handle live animals    Until more is known, it is best not to touch, play with, or handle live animals  Some animals, including pets, have been infected with the new coronavirus  Do not handle or care for animals until you are well  Care includes feeding, petting, and cuddling your pet  Do not let your pet lick you or share your food  Ask someone who is not infected to take care of your pet, if possible  If you must care for a pet, wear a face covering  Wash your hands before and after you give care  · Follow directions from your healthcare provider for being around others after you recover  You will need to wait at least 10 days after symptoms first appeared  Then you will need to have no fever for 24 hours without fever medicine, and no other symptoms  A loss of taste or smell may continue for several months  It is considered okay to be around others if this is your only symptom  It is not known for sure if or for how long a recovered person can pass the virus to others  Your provider may give you instructions, such as continuing social distancing or wearing a face covering around others  How should I take care of someone who has COVID-19? If the person lives in another home, arrange for a time to give care  Remember to bring a few pairs of disposable gloves and a cloth face covering  The following are general guidelines to help you safely care for anyone who has COVID-19:  · Wash your hands often  Wash before and after you go into the person's home, area, or room  Throw paper towels away in a lined trash can that has a lid, if possible  · Do not allow others to go near the person  No one should come into the person's home unless it is necessary  If possible, the person should be in a separate area or room if he or she lives with others  Keep the room's door shut unless you need to go in or out  Have others call, video chat, or e-mail the person if he or she is feeling well enough  The person may feel lonely if he or she is kept separate for a long period of time   Safe communication can help him or her stay connected to family and friends  · Make sure the person's room has good air flow  You may be able to open the window if the weather allows  An air conditioner can also be turned on to help air move  · Contact the person before you go in to give care  Make sure the person is wearing a face covering  Remind him or her to wash his or her hands with soap and water  He or she can use hand  that contains alcohol if soap and water are not available  Put on a face covering before you go in to give care  · Wear gloves while you give care and clean  Clean items the person uses often  Clean countertops, cooking surfaces, and the fronts and insides of the microwave and refrigerator  Clean the shower, toilet, the area around the toilet, the sink, the area around the sink, and faucets  Gather used laundry or bedding  Wash and dry items on the warmest settings the fabric allows  Wash dishes and silverware in hot, soapy water or in a   · Anything you throw away needs to go into a lined trash can  When you need to empty the trash, close the open end of the lining and tie it closed  This helps prevent items the virus is on from spilling out of the trash  Remove your gloves and throw them away  Wash your hands  Where can I find more information? · Centers for Disease Control and Prevention  [address] , [address]  Phone: 5- 848 - [phone]  Web Address: [employer] com     What should I do if I think I or someone I know may be infected? Do the following to protect others:  · If emergency care is needed,  tell the  about the possible infection, or call ahead and tell the emergency department  · Call a healthcare provider  for instructions if symptoms are mild  Anyone who may be infected should not  arrive without calling first  The provider will need to protect staff members and other patients      · The person who may be infected needs to wear a face covering  while getting medical care  This will help lower the risk of infecting others  Coverings are not used for anyone who is younger than 2 years, has breathing problems, or cannot remove it  The provider can give you instructions for anyone who cannot wear a covering  Call your local emergency number (911 in the [address]) or an emergency department if:   · You have trouble breathing or shortness of breath at rest     · You have chest pain or pressure that lasts longer than 5 minutes  · You become confused or hard to wake  · Your lips or face are blue  · You have a fever of 104°F (40°C) or higher  When should I call my doctor? · You do not  have symptoms of COVID-19 but had close physical contact within 14 days with someone who tested positive  · You have questions or concerns about your condition or care  CARE AGREEMENT:   You have the right to help plan your care  Learn about your health condition and how it may be treated  Discuss treatment options with your healthcare providers to decide what care you want to receive  You always have the right to refuse treatment  The above information is an  only  It is not intended as medical advice for individual conditions or treatments  Talk to your doctor, nurse or pharmacist before following any medical regimen to see if it is safe and effective for you  © Copyright [address] Information is for End User's use only and may not be sold, redistributed or otherwise used for commercial purposes   All illustrations and images included in CareNotes® are the copyrighted property of [employer]  or [address] [place] [address] zeal[facility] networkAbra[facility]

## 2021-03-08 ENCOUNTER — TRANSITIONAL CARE MANAGEMENT (OUTPATIENT)
Dept: FAMILY MEDICINE CLINIC | Facility: CLINIC | Age: 26
End: 2021-03-08

## 2021-03-11 ENCOUNTER — TELEPHONE (OUTPATIENT)
Dept: POSTPARTUM | Facility: CLINIC | Age: 26
End: 2021-03-11

## 2021-03-11 NOTE — TELEPHONE ENCOUNTER
Jessie Sarabia called - her daughter Saniya Gomez (8days) currently being discharged today after readmit for jaundice  Jessie Sarabia has been supplementing with formula - feels having a supply issue - Saniya Gomez is cluster feeding at night mainly      Virtual appt set for 3/15

## 2021-03-11 NOTE — TELEPHONE ENCOUNTER
Ernestina Bustamante began supplementing with formula because Rochelle Hamlin appeared fussy after nursing on both breasts  This happened while Rochelel Hamlin was in the hospital and was crying for about an hour after nursing because Ernestina Bustamante was told by nursing staff that she could not take her off of the lights again so soon  Les Motley she felt pressured to feed formula  Donor milk, pumping MOM and alternative methods of feeding were not discussed  She was supplemented a few times while in the hospital   Most of the time she is very content after nursing  She is gaining weight well  Ernestina Bustamante is still having latch on tenderness but otherwise feedings are comfortable  She feels her milk has been in for about 5 days  She can see and hear Rochelle Hamlin drinking while feeding at the breast   Ernestina Bustamante is now concerned that she does not have enough milk to meet Liseth's needs  We discussed normal infant feeding patterns, including cluster feeding  We discussed why cluster feeding happens and it's impact in milk supply  I encouraged Shreya to continue to feed on demand, including during periods of cluster feeding  I reassured her that as long as Rochelle Hamlin is content and actively nursing, likely things are OK  We discussed pumping additional milk for supplement or using formula if absolutely necessary if Rochelleshayla Hamlin is clearly hungry and not content at the breast   Ernestina Bustamante was given information on paced bottle feeding and effective pumping,   We will follow up next week as scheduled

## 2021-03-15 ENCOUNTER — TELEMEDICINE (OUTPATIENT)
Dept: POSTPARTUM | Facility: CLINIC | Age: 26
End: 2021-03-15
Payer: COMMERCIAL

## 2021-03-15 DIAGNOSIS — Z71.89 ENCOUNTER FOR BREAST FEEDING COUNSELING: Primary | ICD-10-CM

## 2021-03-15 PROCEDURE — 1036F TOBACCO NON-USER: CPT | Performed by: PEDIATRICS

## 2021-03-15 PROCEDURE — 99404 PREV MED CNSL INDIV APPRX 60: CPT | Performed by: PEDIATRICS

## 2021-03-15 NOTE — PATIENT INSTRUCTIONS
Continue to feed [place] on demand  Please call for more support as desired with any concerns  Pump after feeding as often as desired to obtain milk for storage  No need to pump after every feeding  It's also important to pump any time you miss a feeding at the breast    When pumping, Cycle your pump through stimulation and expression mode several times in a session to stimulate several let downs  Use hands on pumping and hand expression to increase your output  Maintain your pump as recommended  Use the flange that is most comfortable and most effectively empties the breast     When feeding your expressed milk, use paced bottle feeding  This method is less stressful for your baby, prevents overfeeding and protects the breastfeeding relationship  Please call with any questions or concerns

## 2021-03-15 NOTE — PROGRESS NOTES
Virtual Regular Visit      Assessment/Plan:    Problem List Items Addressed This Visit     None               Reason for visit is   Chief Complaint   Patient presents with    Virtual Regular Visit        Encounter provider [facility] [address]    Provider located at  [address]      Recent Visits  Date Type Provider Dept   21 Telephone [facility]   Showing recent visits within past 7 days and meeting all other requirements     Future Appointments  No visits were found meeting these conditions  Showing future appointments within next 150 days and meeting all other requirements        The patient was identified by name and date of birth  Darshana Bermudez was informed that this is a telemedicine visit and that the visit is being conducted through [employer] and patient was informed that this is a secure, HIPAA-compliant platform  She agrees to proceed     My office door was closed  No one else was in the room  She acknowledged consent and understanding of privacy and security of the video platform  The patient has agreed to participate and understands they can discontinue the visit at any time  Patient is aware this is a billable service  Subjective  Darshana Bermudez is a 30 y o  female   INITIAL BREAST FEEDING EVALUATION    Informant/Relationship: Shreya    Discussion of General Lactation Issues: Stacy Potter feels that Gricelda Bhakta is feeding well most of the time except for about 3-4 hours later in the evening when she wants to nurse every 30 minutes or so  Stacy Potter feels that [employer] and transfers milk well  She has no concerns that Gricelda Bhakta is not nursing well at this time  Stacy Potter has questions about using her pump and how to determine what flange size to use  Infant is 15days old today          History:  Fertility Problem:no  Breast changes:yes - areola were larger and darker  : yes - induced due to maternal HTN  Full term:no 37 weeks   labor:no  First nursing/attempt < 1 hour after birth:yes - baby latched in the delivery room  Skin to skin following delivery:yes - until after the first feeding  Breast changes after delivery:yes - breasts were full and saw mature milk by day 4  Rooming in (infant in room with mother with exception of procedures, eg  Circumcision: no  Blood sugar issues:no  NICU stay:no  Jaundice:yes - required treatment prior to discharge and was also readmitted for more treatment  Phototherapy:yes - twice  Supplement given: (list supplement and method used as well as reason(s):not during initial hospitalization    Fed formula when readmitted    Past Medical History:   Diagnosis Date    Anxiety     Depression     Hypertension during pregnancy in third trimester 2021    Urinary tract infection     Varicella          Current Outpatient Medications:     acetaminophen (TYLENOL) 325 mg tablet, Take 2 tablets (650 mg total) by mouth every 4 (four) hours as needed for moderate pain, headaches or fever, Disp: , Rfl: 0    docusate sodium (COLACE) 100 mg capsule, Take 1 capsule (100 mg total) by mouth 2 (two) times a day, Disp: 30 capsule, Rfl: 0    ibuprofen (MOTRIN) 600 mg tablet, Take 1 tablet (600 mg total) by mouth every 6 (six) hours as needed (cramping), Disp: 30 tablet, Rfl: 0    polyethylene glycol (MIRALAX) 17 g packet, Take 17 g by mouth daily, Disp:  , Rfl: 0    Prenatal Vit-Fe Fumarate-FA (Prenatal Vitamin Plus Low Iron) 27-1 MG TABS, Take 1 tablet by mouth daily, Disp: 90 tablet, Rfl: 3    sertraline (ZOLOFT) 100 mg tablet, take 1 tablet by mouth once daily, Disp: 90 tablet, Rfl: 0    Allergies   Allergen Reactions    Lac Bovis GI Intolerance    Pollen Extract Other (See Comments)     Post nasal drip       Social History     Substance and Sexual Activity   Drug Use Never       Social History Never as smoker    Interval Breastfeeding History:    Frequency of breast feeding: Every 1 5-3 hours on demand except when cluster feeding later in the evening  Does mother feel breastfeeding is effective: Yes  Does infant appear satisfied after nursing:Yes  Stooling pattern normal: Yes  Urinating frequently:Yes  Using shield or shells: No    Alternative/Artificial Feedings:   Bottle: not since discharge from Peds  Cup: No  Syringe/Finger: No           Formula Type: none                     Amount: n/a            Breast Milk:                      Amount: none            Frequency Q 1 5-3 Hr between feedings  Elimination Problems: No      Equipment:  Nipple Shield             Type: none             Size: n/a             Frequency of Use: n/a  Pump            Type: Spectra S1 and Haakaa            Frequency of Use: Not currently using the electric pump  Uses the Haakaa after feeding during daytime feedings  Expresses up to an ounce each time  Shells            Type: none            Frequency of use: n/a    Equipment Problems: yes Rneetta Mcdonald is unsure how to use her electric pump        Video Exam    There were no vitals filed for todays visit  Mom:  Breast: Not assessed today at [address] request   She denies pain, engorgement or erythema  Nipple Assessment in General: Not assessed today  Mother's Awareness of Feeding Cues                 Recognizes: Yes                  Verbalizes: Yes  Support System: FOB, extended family  History of Breastfeeding: none  Changes/Stressors/Violence: Renetta Mcdonald is concerned that she is unable to measure how much milk Pawan Brace is getting when feeding at the breast   Concerns/Goals: Shreya desires to breastfeed for a year  Problems with Mom: anxiety about not being able to give Pawan Brace all that she needs  Physical Exam  Constitutional:       Appearance: Normal appearance  HENT:      Head: Normocephalic and atraumatic  Neck:      Musculoskeletal: Normal range of motion and neck supple     Pulmonary:      Effort: Pulmonary effort is normal    Musculoskeletal: Normal range of motion  General: No swelling  Neurological:      Mental Status: She is alert and oriented to person, place, and time  Skin:     General: Skin is warm and dry  Psychiatric:         Mood and Affect: Mood normal          Behavior: Behavior normal          Thought Content: Thought content normal          Judgment: Judgment normal        Shreya chose not to pump during this visit  She preferred to just discuss how her pump works and how to maintain supply once she returns to work  Handouts:   Paced bottle feeding, Hands on pumping and Hand expression    Education:  Reviewed Frequency/Supply & Demand: Discussed how milk supply is established and maintained  Reviewed Alternative/Artificial Feedings: Discussed paced bottle feeding  Reviewed Equipment: Discussed the use and features of the Spectra S1 and the elements of hands on pumping  Discussed how to determine which flange size to use and products available to improve fit and comfort  We discussed how the Break Time for Nursing Mother's Act protects her right to express her milk while at work for her baby's first year  Plan:  Plan for breastfeeding    Reassurance and support given  Supplementation recommended (document method-education if necessary)  Rachel Vaughan was encouraged to use paced bottle feeding technique when feeding Liseth expressed milk  Use of pump demonstrated  Rachel Vaughan was taught how to use the cycles of her pump, hands on technique, the importance of hand expression and how to determine which flange size to use  I reassured Rachel Vaughan that she does not need to pump after every feeding and that she does not need an excessive amount of milk stored before she returns to work  I encouraged her to call for more support as needed  I have spent 60 minutes with Patient  today in which greater than 50% of this time was spent in counseling/coordination of care regarding Patient and family education                  I spent 60 minutes directly with the patient during this visit      VIRTUAL VISIT DISCLAIMER    Gloria Carbone acknowledges that she has consented to an online visit or consultation  She understands that the online visit is based solely on information provided by her, and that, in the absence of a face-to-face physical evaluation by the physician, the diagnosis she receives is both limited and provisional in terms of accuracy and completeness  This is not intended to replace a full medical face-to-face evaluation by the physician  Gloria Carbone understands and accepts these terms

## 2021-03-16 NOTE — PROGRESS NOTES
Induction of labor for PEC without severe feature  BP today 122/80    Symptoms today - feels great  No headache no blurry vision no epigastric pain   She does report that her vagina is still a bit sour but nothing to much

## 2021-03-16 NOTE — PROGRESS NOTES
I have reviewed the notes, assessments, and/or procedures performed by Chante Alcantara RN, IBCLC, I concur with her/his documentation of Luis Felipe Hurley MD 03/16/21

## 2021-03-17 ENCOUNTER — OFFICE VISIT (OUTPATIENT)
Dept: OBGYN CLINIC | Facility: MEDICAL CENTER | Age: 26
End: 2021-03-17

## 2021-03-17 VITALS
SYSTOLIC BLOOD PRESSURE: 122 MMHG | HEIGHT: 64 IN | DIASTOLIC BLOOD PRESSURE: 80 MMHG | WEIGHT: 174 LBS | BODY MASS INDEX: 29.71 KG/M2

## 2021-03-17 DIAGNOSIS — O16.3 HYPERTENSION DURING PREGNANCY IN THIRD TRIMESTER, UNSPECIFIED HYPERTENSION IN PREGNANCY TYPE: Primary | ICD-10-CM

## 2021-03-17 PROCEDURE — 3008F BODY MASS INDEX DOCD: CPT | Performed by: PEDIATRICS

## 2021-03-17 PROCEDURE — PNV: Performed by: OBSTETRICS & GYNECOLOGY

## 2021-03-29 ENCOUNTER — TELEPHONE (OUTPATIENT)
Dept: FAMILY MEDICINE CLINIC | Facility: CLINIC | Age: 26
End: 2021-03-29

## 2021-04-14 ENCOUNTER — OFFICE VISIT (OUTPATIENT)
Dept: FAMILY MEDICINE CLINIC | Facility: CLINIC | Age: 26
End: 2021-04-14
Payer: COMMERCIAL

## 2021-04-14 VITALS
SYSTOLIC BLOOD PRESSURE: 108 MMHG | WEIGHT: 174 LBS | BODY MASS INDEX: 29.71 KG/M2 | HEART RATE: 75 BPM | DIASTOLIC BLOOD PRESSURE: 72 MMHG | OXYGEN SATURATION: 96 % | TEMPERATURE: 97.3 F | HEIGHT: 64 IN

## 2021-04-14 DIAGNOSIS — Z00.00 WELL ADULT EXAM: Primary | ICD-10-CM

## 2021-04-14 DIAGNOSIS — Z13.31 DEPRESSION SCREENING NEGATIVE: ICD-10-CM

## 2021-04-14 DIAGNOSIS — Z00.00 ANNUAL PHYSICAL EXAM: ICD-10-CM

## 2021-04-14 PROBLEM — O99.212 OBESITY AFFECTING PREGNANCY IN SECOND TRIMESTER: Status: RESOLVED | Noted: 2020-09-14 | Resolved: 2021-04-14

## 2021-04-14 PROBLEM — O14.93 PRE-ECLAMPSIA IN THIRD TRIMESTER: Status: RESOLVED | Noted: 2021-02-27 | Resolved: 2021-04-14

## 2021-04-14 PROBLEM — Z34.02 ENCOUNTER FOR SUPERVISION OF NORMAL FIRST PREGNANCY IN SECOND TRIMESTER: Status: RESOLVED | Noted: 2020-12-07 | Resolved: 2021-04-14

## 2021-04-14 PROBLEM — O21.9 NAUSEA AND VOMITING OF PREGNANCY, ANTEPARTUM: Status: RESOLVED | Noted: 2020-09-14 | Resolved: 2021-04-14

## 2021-04-14 PROBLEM — O16.3 HYPERTENSION DURING PREGNANCY IN THIRD TRIMESTER: Status: RESOLVED | Noted: 2021-02-25 | Resolved: 2021-04-14

## 2021-04-14 PROBLEM — Z3A.36 36 WEEKS GESTATION OF PREGNANCY: Status: RESOLVED | Noted: 2020-12-07 | Resolved: 2021-04-14

## 2021-04-14 PROBLEM — R82.71 GBS BACTERIURIA: Status: RESOLVED | Noted: 2021-01-26 | Resolved: 2021-04-14

## 2021-04-14 PROCEDURE — 99395 PREV VISIT EST AGE 18-39: CPT | Performed by: PHYSICIAN ASSISTANT

## 2021-04-14 PROCEDURE — 3725F SCREEN DEPRESSION PERFORMED: CPT | Performed by: PHYSICIAN ASSISTANT

## 2021-04-14 PROCEDURE — 1036F TOBACCO NON-USER: CPT | Performed by: PHYSICIAN ASSISTANT

## 2021-04-14 NOTE — PROGRESS NOTES
[address] [facility]    NAME: Sis Mendoza  AGE: 30 y o  SEX: female  : 1995     DATE: 2021     Assessment and Plan:     Problem List Items Addressed This Visit     None      Visit Diagnoses     Well adult exam    -  Primary    Annual physical exam        BMI 29 0-29 9,adult        Depression screening negative              Immunizations and preventive care screenings were discussed with patient today  Appropriate education was printed on patient's after visit summary  Counseling:  · Dental Health: discussed importance of regular tooth brushing, flossing, and dental visits  Return in about 1 year (around 2022) for Annual physical      Chief Complaint:     Chief Complaint   Patient presents with    Well Check      History of Present Illness:     Adult Annual Physical   Patient here for a comprehensive physical exam  The patient reports no problems  Diet and Physical Activity  · Diet/Nutrition: well balanced diet  · Exercise: walking  Depression Screening  PHQ-9 Depression Screening    PHQ-9:   Frequency of the following problems over the past two weeks:      Little interest or pleasure in doing things: 0 - not at all  Feeling down, depressed, or hopeless: 0 - not at all  Trouble falling or staying asleep, or sleeping too much: 0 - not at all  Feeling tired or having little energy: 0 - not at all  Poor appetite or overeatin - not at all  Feeling bad about yourself - or that you are a failure or have let yourself or your family down: 0 - not at all  Trouble concentrating on things, such as reading the newspaper or watching television: 0 - not at all  Moving or speaking so slowly that other people could have noticed   Or the opposite - being so fidgety or restless that you have been moving around a lot more than usual: 0 - not at all  Thoughts that you would be better off dead, or of hurting yourself in some way: 0 - not at all  PHQ-2 Score: 0  PHQ-9 Score: 0       General Health  · Sleep: sleeps well  · Hearing: normal - bilateral   · Vision: no vision problems  · Dental: regular dental visits  Review of Systems:     Review of Systems   Constitutional: Negative for activity change, appetite change, chills, diaphoresis, fatigue, fever and unexpected weight change  HENT: Negative for congestion, ear pain, postnasal drip, rhinorrhea, sinus pressure, sinus pain, sneezing, sore throat, tinnitus and voice change  Eyes: Negative for pain, redness and visual disturbance  Respiratory: Negative for cough, chest tightness, shortness of breath and wheezing  Cardiovascular: Negative for chest pain, palpitations and leg swelling  Gastrointestinal: Negative for abdominal pain, blood in stool, constipation, diarrhea, nausea and vomiting  Genitourinary: Negative for difficulty urinating, dysuria, frequency, hematuria and urgency  Musculoskeletal: Negative for arthralgias, back pain, gait problem, joint swelling, myalgias, neck pain and neck stiffness  Skin: Negative for color change, pallor, rash and wound  Neurological: Negative for dizziness, tremors, weakness, light-headedness and headaches  Psychiatric/Behavioral: Negative for dysphoric mood, self-injury, sleep disturbance and suicidal ideas  The patient is not nervous/anxious         Past Medical History:     Past Medical History:   Diagnosis Date    Anxiety     Depression     Hypertension during pregnancy in third trimester 2/25/2021    Urinary tract infection     Varicella       Past Surgical History:     Past Surgical History:   Procedure Laterality Date    APPENDECTOMY      WISDOM TOOTH EXTRACTION        Social History:     E-Cigarette/Vaping    E-Cigarette Use Never User      E-Cigarette/Vaping Substances    Nicotine No     THC No     CBD No     Flavoring No     Other No     Unknown No      Social History     Socioeconomic History    Marital status: /Civil Union     Spouse name: None    Number of children: None    Years of education: None    Highest education level: None   Occupational History    None   Social Needs    Financial resource strain: None    Food insecurity     Worry: None     Inability: None    Transportation needs     Medical: None     Non-medical: None   Tobacco Use    Smoking status: Never Smoker    Smokeless tobacco: Never Used   Substance and Sexual Activity    Alcohol use: Never     Frequency: Monthly or less     Comment: prior to pregnancy    Drug use: Never    Sexual activity: Not Currently     Partners: Male     Birth control/protection: None   Lifestyle    Physical activity     Days per week: None     Minutes per session: None    Stress: None   Relationships    Social connections     Talks on phone: None     Gets together: None     Attends Hindu service: None     Active member of club or organization: None     Attends meetings of clubs or organizations: None     Relationship status: None    Intimate partner violence     Fear of current or ex partner: None     Emotionally abused: None     Physically abused: None     Forced sexual activity: None   Other Topics Concern    None   Social History Narrative    None      Family History:     Family History   Problem Relation Age of Onset    Hypertension Mother     No Known Problems Father     No Known Problems Sister     No Known Problems Maternal Grandmother     Prostate cancer Maternal Grandfather     No Known Problems Paternal Grandmother     Prostate cancer Paternal Grandfather     Atrial fibrillation Paternal Grandfather     Breast cancer Neg Hx     Colon cancer Neg Hx     Ovarian cancer Neg Hx       Current Medications:     Current Outpatient Medications   Medication Sig Dispense Refill    Prenatal Vit-Fe Fumarate-FA (Prenatal Vitamin Plus Low Iron) 27-1 MG TABS Take 1 tablet by mouth daily 90 tablet 3    sertraline (ZOLOFT) 100 mg tablet take 1 tablet by mouth once daily 90 tablet 0    acetaminophen (TYLENOL) 325 mg tablet Take 2 tablets (650 mg total) by mouth every 4 (four) hours as needed for moderate pain, headaches or fever (Patient not taking: Reported on 3/15/2021)  0    docusate sodium (COLACE) 100 mg capsule Take 1 capsule (100 mg total) by mouth 2 (two) times a day (Patient not taking: Reported on 3/15/2021) 30 capsule 0    ibuprofen (MOTRIN) 600 mg tablet Take 1 tablet (600 mg total) by mouth every 6 (six) hours as needed (cramping) (Patient not taking: Reported on 3/15/2021) 30 tablet 0    polyethylene glycol (MIRALAX) 17 g packet Take 17 g by mouth daily (Patient not taking: Reported on 3/15/2021)  0     No current facility-administered medications for this visit  Allergies: Allergies   Allergen Reactions    Lac Bovis GI Intolerance    Pollen Extract Other (See Comments)     Post nasal drip      Physical Exam:     /72   Pulse 75   Temp (!) 97 3 °F (36 3 °C)   Ht 5' 4" (1 626 m)   Wt 78 9 kg (174 lb)   LMP 06/17/2020 (Exact Date)   SpO2 96%   BMI 29 87 kg/m²     Physical Exam  Vitals signs and nursing note reviewed  Constitutional:       General: She is not in acute distress  Appearance: She is well-developed  HENT:      Head: Normocephalic and atraumatic  Eyes:      Conjunctiva/sclera: Conjunctivae normal    Neck:      Musculoskeletal: Neck supple  Cardiovascular:      Rate and Rhythm: Normal rate and regular rhythm  Heart sounds: No murmur  Pulmonary:      Effort: Pulmonary effort is normal  No respiratory distress  Breath sounds: Normal breath sounds  Abdominal:      Palpations: Abdomen is soft  Tenderness: There is no abdominal tenderness  Skin:     General: Skin is warm and dry  Neurological:      Mental Status: She is alert            [facility]     PHQ-9 Depression Screening    PHQ-9:   Frequency of the following problems over the past two weeks:      Little interest or pleasure in doing things: 0 - not at all  Feeling down, depressed, or hopeless: 0 - not at all  Trouble falling or staying asleep, or sleeping too much: 0 - not at all  Feeling tired or having little energy: 0 - not at all  Poor appetite or overeatin - not at all  Feeling bad about yourself - or that you are a failure or have let yourself or your family down: 0 - not at all  Trouble concentrating on things, such as reading the newspaper or watching television: 0 - not at all  Moving or speaking so slowly that other people could have noticed  Or the opposite - being so fidgety or restless that you have been moving around a lot more than usual: 0 - not at all  Thoughts that you would be better off dead, or of hurting yourself in some way: 0 - not at all  PHQ-2 Score: 0  PHQ-9 Score: 0           BMI Counseling: Body mass index is 29 87 kg/m²  The BMI is above normal  Nutrition recommendations include reducing portion sizes, decreasing overall calorie intake and 3-5 servings of fruits/vegetables daily  Exercise recommendations include exercising 3-5 times per week

## 2021-04-14 NOTE — PATIENT INSTRUCTIONS

## 2021-04-19 NOTE — PROGRESS NOTES
Post partum Visit      1   3/3/21 - induction do to [facility]     2  Breast feeding - exclusive    Breast pain or mastitis - no    [facility] for lactation   / also given the PT form if she would need it     3  Post partum depression screening    Risk - low    Support at [facility]    4  Sex - not yet     5  Contraception / future fertility - does not want contraception went over protection with BF but advised could drop an egg prior to the first cycle   She understands     6  Return to work  - not sure     7  Weight    Starting -170   Delivery 194   Total gain -20     Currently 171 3    8    Annual    Pap - 2018-neg next is due  - will make apt for annual in sky

## 2021-04-20 ENCOUNTER — POSTPARTUM VISIT (OUTPATIENT)
Dept: OBGYN CLINIC | Facility: MEDICAL CENTER | Age: 26
End: 2021-04-20

## 2021-04-20 VITALS — WEIGHT: 171.4 LBS | BODY MASS INDEX: 29.26 KG/M2 | HEIGHT: 64 IN

## 2021-04-20 PROCEDURE — 99024 POSTOP FOLLOW-UP VISIT: CPT | Performed by: OBSTETRICS & GYNECOLOGY

## 2021-04-20 PROCEDURE — 3008F BODY MASS INDEX DOCD: CPT | Performed by: PHYSICIAN ASSISTANT

## 2021-05-11 ENCOUNTER — OFFICE VISIT (OUTPATIENT)
Dept: POSTPARTUM | Facility: CLINIC | Age: 26
End: 2021-05-11
Payer: COMMERCIAL

## 2021-05-11 DIAGNOSIS — Z71.89 ENCOUNTER FOR BREAST FEEDING COUNSELING: Primary | ICD-10-CM

## 2021-05-11 PROCEDURE — 99404 PREV MED CNSL INDIV APPRX 60: CPT | Performed by: PEDIATRICS

## 2021-05-11 NOTE — PROGRESS NOTES
BREAST FEEDING FOLLOW UP VISIT    Informant/Relationship: Shreya    Discussion of General Lactation Issues: For the last 2-3 weeks, Russ Perez becomes very frustrated when feeding and will eventually refuses to latch  This happens only later in the evening and overnight  Infant is 2 months old today  Interval Breastfeeding History:    Frequency of breast feeding: Every 2 5-4 hours during the day  Sleeps 8 hours overnight  Does mother feel breastfeeding is effective: Yes  Does infant appear satisfied after nursing:Yes, earlier in the day but not later in the day  Stooling pattern normal:Yes  Urinating frequently:Yes  Using shield or shells:No    Alternative/Artificial Feedings:   Bottle: Yes, occasionally  Cup: No  Syringe/Finger: No           Formula Type: none                     Amount: n/a            Breast Milk:                      Amount: 3-4 ounces            Frequency Q 2 5-4 Hr between feedings  Elimination Problems: No      Equipment:  Nipple Shield             Type: none             Size: n/a             Frequency of Use: n/a  Pump            Type: Spectra S1, [place] and [place]            Frequency of Use: Twice a day after nursing  Expresses about 4 ounces from one breast per session  Shells            Type: none            Frequency of use: n/a    Equipment Problems: no      Mom:  Breast: Medium sized symmetrical breasts  Rounded shape  Appropriately spaced  Nipple Assessment in General: Normal: elongated/eraser, no discoloration and no damage noted  Mother's Awareness of Feeding Cues                 Recognizes:  Yes                  Verbalizes: Yes  Support System: FOB, extended family  History of Breastfeeding: none  Changes/Stressors/Violence: Charli Priest is concerned about Liseth's recent fussiness at the breast  Concerns/Goals: Charli Priest wants to continue breastfeeding    Problems with Mom: None    OBGyn Exam    Infant:  Behaviors: Alert  Color: Pink  Birth weight: 3055gram  Current weight: 4780gram    Problems with infant: recently fussy while nursing      General Appearance:  Alert, active, no distress                            Head:  Normocephalic, AFOF, sutures opposed                            Eyes:   Conjunctiva clear, no drainage                            Ears:   Normally placed, no anomolies                           Nose:   Septum intact, no drainage or erythema                          Mouth:  No lesions                   Neck:  Supple, symmetrical, trachea midline, no adenopathy; thyroid: no enlargement, symmetric, no tenderness/mass/nodules                Respiratory:  No grunting, flaring, retractions, breath sounds clear and equal           Cardiovascular:  Regular rate and rhythm  No murmur  Adequate perfusion/capillary refill  Femoral pulse present                  Abdomen:    Soft, non-tender, no masses, bowel sounds present, no HSM            Genitourinary:  Normal female genitalia, anus patent                         Spine:   No abnormalities noted       Musculoskeletal:   Full range of motion         Skin/Hair/Nails:   Skin warm, dry, and intact, no rashes or abnormal dyspigmentation or lesions               Neurologic:   No abnormal movement, tone appropriate for gestational age     Latch:  Efficiency:               Lips Flanged: lower lip flanges, upper lip curls under              Depth of latch: shallow initially, better after repositioning              Audible Swallow: Yes, some              Visible Milk: Yes              Wide Open/ Asymmetrical: after repositioning              Suck Swallow Cycle: Breathing: unlabored, Coordinated: yes  Nipple Assessment after latch: Normal: elongated/eraser, no discoloration and no damage noted  Latch Problems: Initial latch was very shallow  After repositioning, latch improved but was not optimal   David Euceda only nursed for a few minutes before beginning to pull on the nipple and become frustrated    Alban Holden continued to relatch her to same breast   I suggested that she try the other breast   Jeanie Lopes did latch readily but only nursed for a minute or so before becoming unsettled  We ended the feeding and Jeanie Lopes remained pleasant and social for the remainder of the visit  Position:  Infant's Ergonomics/Body               Body Alignment: Yes, after education               Head Supported: Yes               Close to Mom's body/ Lifted/ Supported: Yes, after education               Mom's Ergonomics/Body: Yes, after education                           Supported: Yes, after education                           Sitting Back: Yes, after education                           Brings Baby to her breast: Yes, after education  Positioning Problems: Initially, Shreya leaned over Jeanie Lopes and attempted to push her nipple into Liseth's mouth  Liseth's body was not in good alignment and her head was not tipped back      Handouts:   Storing human milk, Paced bottle feeding and Latch Check List    Education:  Reviewed Latch: Demonstrated how to gently compress the breast and align the baby so that her nose is just above the nipple with her lower lip and chin touching the breast to encourage the deepest, widest, off-center latch  Reviewed Positioning for Dyad: Demonstrated how to position baby "belly to belly" with mom with her ear, shoulder and hip in alignment  Reviewed Alternative/Artificial Feedings: Discussed and demonstrated paced bottle feeding  Plan:  Plan for breastfeeding    Reassurance and support given  Demonstrated position to hold infant (state which ones)  Tami De Jesus was encouraged to relax fully supported in her chair, position Jeanie Lopes at the level of the breast in good alignment and in close contact with Shreya before brining her onto the breast chin first with the nipple below her nose  Supplementation recommended (document method-education if necessary)  Shreya was taught paced bottle feeding technique     I reassured Shreya that her breasts will refill when she chooses to pump after feeding and Joy Dominguez will get plenty of milk at the next feeding  I encouraged Shreya to switch breasts when Joy Dominguez gets fussy while feeding if gently compressing the breast to increase flow does not calm her  I also suggested ending the feeding if that does not work in [address] is not actually hungry at that time  A follow up was scheduled for two weeks to check progress  I have spent 60 minutes with Patient and family today in which greater than 50% of this time was spent in counseling/coordination of care regarding Patient and family education

## 2021-05-11 NOTE — PATIENT INSTRUCTIONS
Continue to feed Landy on demand  Pay close attention to positioning for a deeper latch  Refer to the instructional video "Attaching Your Baby at the Breast" on the [address] [place] [address] website for further review  If Landy becomes fussy while nursing, gently compress the breast to increase flow  If she is still fussy, switch breasts  If Landy refuses to nurse for more than a couple of minutes, she may not be hungry  It's OK to wait a while and try again later  Pump as desired and feed expressed milk as desired  When feeding your expressed milk, use paced bottle feeding  This method is less stressful for your baby, prevents overfeeding and protects the breastfeeding relationship  Follow up as scheduled  Call with questions or concerns

## 2021-05-17 NOTE — PROGRESS NOTES
I have reviewed the notes, assessments, and/or procedures performed by Sapna Ortiz RN, IBCLC, I concur with her/his documentation of Jon Porter MD 05/17/21

## 2021-05-24 ENCOUNTER — OFFICE VISIT (OUTPATIENT)
Dept: POSTPARTUM | Facility: CLINIC | Age: 26
End: 2021-05-24
Payer: COMMERCIAL

## 2021-05-24 DIAGNOSIS — Z71.89 ENCOUNTER FOR BREAST FEEDING COUNSELING: Primary | ICD-10-CM

## 2021-05-24 PROCEDURE — 99403 PREV MED CNSL INDIV APPRX 45: CPT | Performed by: PEDIATRICS

## 2021-05-24 PROCEDURE — 1036F TOBACCO NON-USER: CPT | Performed by: PEDIATRICS

## 2021-05-24 NOTE — PATIENT INSTRUCTIONS
Continue to feed Landy on demand  Use gentle breast compressions to increase flow as needed and switch breasts when Landy begins to pull on the nipple or repeatedly unlatch  You can offer each breast up to two times per feeding until she is content  Consider a dream feeding once at some point in the night  Feed expressed milk as needed with paced bottle feeding technique  Pump when a feeding at the breast is missed and as desired  Follow up with Dr Howard Argueta as scheduled  Please call with any questions or concerns

## 2021-05-24 NOTE — PROGRESS NOTES
BREAST FEEDING FOLLOW UP VISIT    Informant/Relationship: Shreya    Discussion of General Lactation Issues: Wiliam Dorman feels that David Murphy is not as fussy as she has been when feeding but she still unlatches frequently  She feels the breast compressions we discussed at our last visit have helped  Infant is 2 months old today  Interval Breastfeeding History:    Frequency of breast feeding: On demand every 2 5-3 hours during the day  Sleeps 8-10 hours overnight  Does mother feel breastfeeding is effective: Yes  Does infant appear satisfied after nursing:Yes  Stooling pattern normal:Yes  Urinating frequently:Yes  Using shield or shells:No    Alternative/Artificial Feedings:   Bottle: Yes, once a day  Cup: No  Syringe/Finger: No           Formula Type: none                     Amount: n/a            Breast Milk:                      Amount: 3 ounces            Frequency Q 2 5-10 Hr between feedings  Elimination Problems: No      Equipment:  Nipple Shield             Type: none             Size: n/a             Frequency of Use: n/a  Pump            Type: Spectra, [place] and [place]            Frequency of Use: Twice a day after nursing  Expresses about 4 ounces per session  Shells            Type: none            Frequency of use: n/a    Equipment Problems: no      Mom:  Breast: Medium sized symmetrical breasts  Rounded shape  Appropriately spaced  Nipple Assessment in General: Normal: elongated/eraser, no discoloration and no damage noted  Mother's Awareness of Feeding Cues                 Recognizes: Yes                  Verbalizes: Yes  Support System: FOB, extended family  History of Breastfeeding: none  Changes/Stressors/Violence: Wiliam Dorman is concerned about Liseth's recent behavior during feedings    Concerns/Goals: Shreya wants to continue breastfeeding     Problems with Mom: None        Infant:  Behaviors: Alert  Color: Pale  Birth weight: 3055gram  Current weight: 4985gram    Problems with infant: Trouble maintaining latch at times  General Appearance:  Alert, active, no distress                            Head:  Normocephalic, AFOF, sutures opposed                            Eyes:   Conjunctiva clear, no drainage                            Ears:   Normally placed, no anomolies                           Nose:   no drainage or erythema                          Mouth:  No lesions  High palate  Tongue extends to the lower lip, lateralizes well  Tip distorts slightly with extension and lateralization  Neck:  Supple, symmetrical, trachea midline                Respiratory:  No grunting, flaring, retractions, breath sounds clear and equal           Cardiovascular:  Regular rate and rhythm  No murmur  Adequate perfusion/capillary refill  Femoral pulse present                  Abdomen:    Soft, non-tender, no masses, bowel sounds present, no HSM            Genitourinary:  Normal female genitalia                         Spine:   No abnormalities noted       Musculoskeletal:   Full range of motion         Skin/Hair/Nails:   Skin warm, dry, and intact, no rashes or abnormal dyspigmentation or lesions               Neurologic:   No abnormal movement, tone appropriate    [place] Latch:  Efficiency:               Lips Flanged: Yes              Depth of latch: fair              Audible Swallow: Yes, a few good suckling bursts noted  Visible Milk: Yes              Wide Open/ Asymmetrical: asymmetrical but could be wider              Suck Swallow Cycle: Breathing: unlabored, Coordinated: yes  Nipple Assessment after latch: Normal: elongated/eraser, no discoloration and no damage noted  Latch Problems: Rama Vega latched without difficulty  After several minutes she became a little unsettled and began to pull on the nipple    Breast compressions helped for a while but when they were no longer effective, she was switched to the other breast   After a few minutes of good nursing on the second breast, Rama Vega began to repeatedly latch and unlatch  At my suggestion, she was switched back to the left breast   She nursed for a few more minutes with good milk transfer noted and then was content  Position:  Infant's Ergonomics/Body               Body Alignment: Yes               Head Supported: Yes               Close to Mom's body/ Lifted/ Supported: Yes               Mom's Ergonomics/Body: Yes                           Supported: Yes                           Sitting Back: Yes                           Brings Baby to her breast: Yes  Positioning Problems: None      Handouts:   None    Education:  Reviewed breast compressions and switch nursing and demonstrated when to use both tools to help Rama Vega feed more effectively at the breast        Plan:    I encouraged Shreya to continue to feed on demand using breast compressions as needed and switch nursing to keep Rama Huanga content while nursing  I suggested a dream feed at some point during the night to help prevent morning engorgement for Shreya and to help with Liseth's weight gain/growth  Jessenia Luque is returning to work this week and plans to pump and have the care provider feed expressed milk via paced bottle feeding while she is at work and feedings at the breast when she is home  A follow up appointment was scheduled for a month from now to monitor Liseth's weight and for more support if their breastfeeding issues have not resolved  I have spent 45 minutes with Patient and family today in which greater than 50% of this time was spent in counseling/coordination of care regarding Patient and family education

## 2021-06-05 NOTE — PROGRESS NOTES
I have reviewed the notes, assessments, and/or procedures performed by Flavia Wagoner RN, IBCLC, I concur with her/his documentation of Rosette Arredondo MD 06/05/21

## 2021-06-22 ENCOUNTER — OFFICE VISIT (OUTPATIENT)
Dept: POSTPARTUM | Facility: CLINIC | Age: 26
End: 2021-06-22
Payer: COMMERCIAL

## 2021-06-22 PROCEDURE — 99215 OFFICE O/P EST HI 40 MIN: CPT | Performed by: PEDIATRICS

## 2021-06-22 NOTE — PATIENT INSTRUCTIONS
Continue feeding on demand  Encourage caregivers to use paced bottle feeding for all supplemental bottles with the nipple empty for 15-20 seconds so that Frio Stewart does not expect immediate milk flow  Wear bright colored necklaces with big non-breakable "charms" to help keep her looking toward you while nursing  Gently compress the breast as if offering a sandwich  Bring Frio Stewart to the breast so that her lower lip and chin touch the breast with her nose just above the nipple

## 2021-06-22 NOTE — PROGRESS NOTES
BREAST FEEDING FOLLOW UP VISIT    Informant/Relationship: Shreya/mom    Discussion of General Lactation Issues: Sheila Wilson feels that nursing is improving  Fredis Stewart is not pulling at the breast anymore  But once she has fallen asleep at the first breast, after about 15 minutes, she is done  Fredis Stewart rarely, if ever, will go to the second breast  Sheila Wilson does pump both breasts when she is  from [place]  She also pumps every day after the first feeding  Shreya pumps both breasts when she pumps  She does not routinely pump the breast from which Fredis Stewart doesn't nurse  Sheila Wilson is usually not uncomfortable after Fredis Stewart has nursed  Infant is 2 5 months old today  Interval Breastfeeding History:    Frequency of breast feeding: every 2-3 hours during the day; sleeps longer at night  Does mother feel breastfeeding is effective: Yes  Does infant appear satisfied after nursing:Yes  Stooling pattern normal:Yes  Urinating frequently:Yes  Using shield or shells:No    Alternative/Artificial Feedings:   Bottle: Yes, occasionally  Cup: No  Syringe/Finger: No           Formula Type: n/a                     Amount: n/a            Breast Milk:                      Amount: 3 oz            Frequency Q 2-3 Hr between feedings  Elimination Problems: No      Equipment:  Nipple Shield             Type: n/a             Size: n/a             Frequency of Use: n/a  Pump            Type: Thurmon Links primarily, also has Spectra S1            Frequency of Use: every 3 hours when  and also every morning  Shells            Type: n/a            Frequency of use: n/a    Equipment Problems: no      Mom:  Breast: Not performed today  Nipple Assessment in General: Normal: elongated/eraser, no discoloration and no damage noted  Mother's Awareness of Feeding Cues                 Recognizes:  Yes                  Verbalizes: Yes  Support System: FOB  History of Breastfeeding: None  Changes/Stressors/Violence: Sheila Wilson is concerned about the high arched palate and dimple in the tip of the tongue noted at last visit with [facility]  She wants to be sure that Liseth's eating patterns are normal  She is concerned that Petyon might be teething  Ashish Apple is also concerned about maintaining her production  Concerns/Goals: Ashish Apple wants to breastfeed long term and maintain her production and she wants Yadira Randolph to remain accepting of the breast even while getting the bottle when at  and when GM watches her  Problems with Mom: None at this time    Physical Exam  Constitutional:       Appearance: Normal appearance  She is well-developed and normal weight  HENT:      Head: Normocephalic and atraumatic  Eyes:      Extraocular Movements: Extraocular movements intact  Neck:      Thyroid: No thyromegaly  Cardiovascular:      Rate and Rhythm: Normal rate and regular rhythm  Pulses: Normal pulses  Heart sounds: Normal heart sounds  No murmur heard  Pulmonary:      Effort: Pulmonary effort is normal       Breath sounds: Normal breath sounds  Musculoskeletal:      Cervical back: Normal range of motion and neck supple  Lymphadenopathy:      Cervical: No cervical adenopathy  Upper Body:      Right upper body: No pectoral adenopathy  Left upper body: No pectoral adenopathy  Neurological:      General: No focal deficit present  Mental Status: She is alert and oriented to person, place, and time  Psychiatric:         Mood and Affect: Mood normal          Behavior: Behavior normal          Thought Content: Thought content normal          Judgment: Judgment normal    Vitals and nursing note reviewed           Infant:  Behaviors: Alert  Color: Healthy  Birth weight: 3 055 kg  Current weight: 5 46 kg    Problems with infant: Some distractibility when nursing      General Appearance:  Alert, active, no distress                            Head:  Normocephalic, AFOF, sutures opposed                            Eyes:   Conjunctiva clear, no drainage Ears:   Normally placed, no anomolies                           Nose:   Septum intact, no drainage or erythema                          Mouth:  No lesions; good tongue lift and extension; good lateralization; very minimal dimpling noted in the tip of the tongue with extension;                    Neck:  Supple, symmetrical, trachea midline, no adenopathy; thyroid: no enlargement, symmetric, no tenderness/mass/nodules                Respiratory:  No grunting, flaring, retractions, breath sounds clear and equal           Cardiovascular:  Regular rate and rhythm  No murmur  Adequate perfusion/capillary refill  Femoral pulse present                  Abdomen:    Soft, non-tender, no masses, bowel sounds present, no HSM            Genitourinary:  Normal female genitalia, anus patent                         Spine:   No abnormalities noted       Musculoskeletal:   Full range of motion         Skin/Hair/Nails:   Skin warm, dry, and intact, no rashes or abnormal dyspigmentation or lesions               Neurologic:   No abnormal movement, tone appropriate for gestational age     Latch:  Efficiency:               Lips Flanged: Yes              Depth of latch: Very good              Audible Swallow: Yes              Visible Milk: Yes              Wide Open/ Asymmetrical: Yes              Suck Swallow Cycle: Breathing: Unlabored, Coordinated: Yes  Nipple Assessment after latch: Normal: elongated/eraser, no discoloration and no damage noted  Latch Problems: None    Position:  Infant's Ergonomics/Body               Body Alignment: Yes               Head Supported: Yes               Close to Mom's body/ Lifted/ Supported: Yes               Mom's Ergonomics/Body: Yes                           Supported:  Yes                           Sitting Back: Yes                           Brings Baby to her breast: Yes  Positioning Problems: None      Education:  Reviewed Latch: Reviewed how to gently compress the breast as if offering a sandwich to facilitate a deeper latch  Reviewed Frequency/Supply & Demand: Continue feeding on demand  Reviewed the reasons for the recommendations to feed every 5 hours at night  However, Soledad Llanos is growing well and Shreya's milk production has not been affected  Discussed the potential risks of a long time without stimulating the breasts and recommended observing for changes in production  Reviewed Alternative/Artificial Feedings: Encourage all caregivers to use paced bottle feeding and start with an empty nipple for 15-20 seconds  Plan:  Discussed history and physical exams with Shreya  Reassurance given that Soledad Llanos is gaining weight well and that she has plenty of milk to meet Liseth's needs  Encouraged continuing to feed Soledad Llanos on demand  Since the breast responds more slowly to stimulation and the bottle flows immediately, recommended that all caregivers start paced bottle feeding with an empty nipple for the first 15-20 seconds  Encouraged Shreya to wear large, colorful necklaces to help hold the baby's attention when nursing  Discussed the potential risks of the long sleeping time at night without breast stimulation  Recommended that Shreya continue to monitor the baby's weight gain and her milk production  Support will remain available at any time  I have spent 50 minutes with Patient  today in which greater than 50% of this time was spent in counseling/coordination of care regarding Prognosis, Intructions for management, Patient and family education and Impressions

## 2021-06-29 DIAGNOSIS — F41.9 ANXIETY: ICD-10-CM

## 2021-06-29 RX ORDER — SERTRALINE HYDROCHLORIDE 100 MG/1
TABLET, FILM COATED ORAL
Qty: 90 TABLET | Refills: 0 | Status: SHIPPED | OUTPATIENT
Start: 2021-06-29 | End: 2021-09-23 | Stop reason: SDUPTHER

## 2021-09-08 ENCOUNTER — TELEPHONE (OUTPATIENT)
Dept: POSTPARTUM | Facility: CLINIC | Age: 26
End: 2021-09-08

## 2021-09-08 NOTE — TELEPHONE ENCOUNTER
Shreya called - has questions on the Covid Vaccine & breastfeeding Nathan Chu (6mths)  She did ask if you get her voice mail to try again later

## 2021-09-08 NOTE — TELEPHONE ENCOUNTER
Amanda Chandler is concerned that if she gets the Covid vaccine that her milk supply would drop  She has heard from colleagues that they experienced drop in supply after receiving it  I reassured Amanda Chandler that the research available has shown that reduction in supply is temporary (just 24-48 hours) if it occurs at all  I encouraged her to keep nursing and, if it reassures her, to express a little extra milk to have on hand for soon after the vaccine if she needs it  I encouraged her to call back with any questions or concerns

## 2021-09-23 DIAGNOSIS — Z34.91 ENCOUNTER FOR PREGNANCY RELATED EXAMINATION IN FIRST TRIMESTER: ICD-10-CM

## 2021-09-23 DIAGNOSIS — F41.9 ANXIETY: ICD-10-CM

## 2021-09-23 RX ORDER — SERTRALINE HYDROCHLORIDE 100 MG/1
100 TABLET, FILM COATED ORAL DAILY
Qty: 90 TABLET | Refills: 0 | Status: SHIPPED | OUTPATIENT
Start: 2021-09-23 | End: 2022-02-15 | Stop reason: SDUPTHER

## 2021-09-23 RX ORDER — VITAMIN A, VITAMIN C, VITAMIN D-3, VITAMIN E, VITAMIN B-1, VITAMIN B-2, NIACIN, VITAMIN B-6, CALCIUM, IRON, ZINC, COPPER 4000; 120; 400; 22; 1.84; 3; 20; 10; 1; 12; 200; 27; 25; 2 [IU]/1; MG/1; [IU]/1; MG/1; MG/1; MG/1; MG/1; MG/1; MG/1; UG/1; MG/1; MG/1; MG/1; MG/1
TABLET ORAL
Qty: 90 TABLET | Refills: 3 | Status: SHIPPED | OUTPATIENT
Start: 2021-09-23

## 2021-10-17 ENCOUNTER — OFFICE VISIT (OUTPATIENT)
Dept: URGENT CARE | Facility: CLINIC | Age: 26
End: 2021-10-17
Payer: COMMERCIAL

## 2021-10-17 VITALS
HEIGHT: 64 IN | SYSTOLIC BLOOD PRESSURE: 117 MMHG | WEIGHT: 171 LBS | TEMPERATURE: 97.3 F | RESPIRATION RATE: 16 BRPM | HEART RATE: 65 BPM | DIASTOLIC BLOOD PRESSURE: 79 MMHG | OXYGEN SATURATION: 98 % | BODY MASS INDEX: 29.19 KG/M2

## 2021-10-17 DIAGNOSIS — H66.001 ACUTE SUPPURATIVE OTITIS MEDIA OF RIGHT EAR WITHOUT SPONTANEOUS RUPTURE OF TYMPANIC MEMBRANE, RECURRENCE NOT SPECIFIED: Primary | ICD-10-CM

## 2021-10-17 PROCEDURE — G0382 LEV 3 HOSP TYPE B ED VISIT: HCPCS | Performed by: PHYSICIAN ASSISTANT

## 2021-10-17 RX ORDER — FLUTICASONE PROPIONATE 50 MCG
2 SPRAY, SUSPENSION (ML) NASAL DAILY
Qty: 18.2 ML | Refills: 0 | Status: SHIPPED | OUTPATIENT
Start: 2021-10-17 | End: 2021-12-22

## 2021-10-17 RX ORDER — AMOXICILLIN AND CLAVULANATE POTASSIUM 875; 125 MG/1; MG/1
1 TABLET, FILM COATED ORAL EVERY 12 HOURS SCHEDULED
Qty: 20 TABLET | Refills: 0 | Status: SHIPPED | OUTPATIENT
Start: 2021-10-17 | End: 2021-10-27

## 2021-10-23 ENCOUNTER — OFFICE VISIT (OUTPATIENT)
Dept: URGENT CARE | Facility: MEDICAL CENTER | Age: 26
End: 2021-10-23
Payer: COMMERCIAL

## 2021-10-23 VITALS
HEIGHT: 64 IN | HEART RATE: 71 BPM | BODY MASS INDEX: 29.02 KG/M2 | WEIGHT: 170 LBS | RESPIRATION RATE: 20 BRPM | SYSTOLIC BLOOD PRESSURE: 124 MMHG | OXYGEN SATURATION: 97 % | DIASTOLIC BLOOD PRESSURE: 78 MMHG | TEMPERATURE: 98.7 F

## 2021-10-23 DIAGNOSIS — H66.93 BILATERAL OTITIS MEDIA, UNSPECIFIED OTITIS MEDIA TYPE: Primary | ICD-10-CM

## 2021-10-23 PROCEDURE — G0382 LEV 3 HOSP TYPE B ED VISIT: HCPCS | Performed by: PHYSICIAN ASSISTANT

## 2021-10-23 RX ORDER — AZITHROMYCIN 250 MG/1
TABLET, FILM COATED ORAL
Qty: 6 TABLET | Refills: 0 | Status: SHIPPED | OUTPATIENT
Start: 2021-10-23 | End: 2021-10-27

## 2021-12-22 ENCOUNTER — OFFICE VISIT (OUTPATIENT)
Dept: FAMILY MEDICINE CLINIC | Facility: CLINIC | Age: 26
End: 2021-12-22
Payer: COMMERCIAL

## 2021-12-22 VITALS
BODY MASS INDEX: 28.31 KG/M2 | DIASTOLIC BLOOD PRESSURE: 68 MMHG | SYSTOLIC BLOOD PRESSURE: 104 MMHG | WEIGHT: 165.8 LBS | TEMPERATURE: 96.5 F | HEIGHT: 64 IN

## 2021-12-22 DIAGNOSIS — R19.7 DIARRHEA, UNSPECIFIED TYPE: Primary | ICD-10-CM

## 2021-12-22 PROCEDURE — 3725F SCREEN DEPRESSION PERFORMED: CPT | Performed by: PHYSICIAN ASSISTANT

## 2021-12-22 PROCEDURE — 99214 OFFICE O/P EST MOD 30 MIN: CPT | Performed by: PHYSICIAN ASSISTANT

## 2021-12-22 PROCEDURE — 3008F BODY MASS INDEX DOCD: CPT | Performed by: PHYSICIAN ASSISTANT

## 2021-12-22 PROCEDURE — 1036F TOBACCO NON-USER: CPT | Performed by: PHYSICIAN ASSISTANT

## 2022-01-12 ENCOUNTER — TELEMEDICINE (OUTPATIENT)
Dept: FAMILY MEDICINE CLINIC | Facility: CLINIC | Age: 27
End: 2022-01-12
Payer: COMMERCIAL

## 2022-01-12 VITALS — BODY MASS INDEX: 28.17 KG/M2 | HEIGHT: 64 IN | WEIGHT: 165 LBS | TEMPERATURE: 100.5 F

## 2022-01-12 DIAGNOSIS — B34.9 VIRAL INFECTION, UNSPECIFIED: Primary | ICD-10-CM

## 2022-01-12 DIAGNOSIS — Z20.822 EXPOSURE TO COVID-19 VIRUS: ICD-10-CM

## 2022-01-12 PROCEDURE — 3725F SCREEN DEPRESSION PERFORMED: CPT | Performed by: PHYSICIAN ASSISTANT

## 2022-01-12 PROCEDURE — 99213 OFFICE O/P EST LOW 20 MIN: CPT | Performed by: PHYSICIAN ASSISTANT

## 2022-01-12 PROCEDURE — U0005 INFEC AGEN DETEC AMPLI PROBE: HCPCS | Performed by: PHYSICIAN ASSISTANT

## 2022-01-12 PROCEDURE — U0003 INFECTIOUS AGENT DETECTION BY NUCLEIC ACID (DNA OR RNA); SEVERE ACUTE RESPIRATORY SYNDROME CORONAVIRUS 2 (SARS-COV-2) (CORONAVIRUS DISEASE [COVID-19]), AMPLIFIED PROBE TECHNIQUE, MAKING USE OF HIGH THROUGHPUT TECHNOLOGIES AS DESCRIBED BY CMS-2020-01-R: HCPCS | Performed by: PHYSICIAN ASSISTANT

## 2022-01-12 PROCEDURE — 1036F TOBACCO NON-USER: CPT | Performed by: PHYSICIAN ASSISTANT

## 2022-01-12 PROCEDURE — 3008F BODY MASS INDEX DOCD: CPT | Performed by: PHYSICIAN ASSISTANT

## 2022-01-12 NOTE — PROGRESS NOTES
COVID-19 Outpatient Progress Note    Assessment/Plan:    Problem List Items Addressed This Visit     None      Visit Diagnoses     Viral infection, unspecified    -  Primary    Relevant Orders    COVID Only- Collected at [facility] or [facility]    Exposure to COVID-19 virus        Relevant Orders    COVID Only- Collected at [facility] or [facility]         Disposition:     Referred patient to centralized site to test for COVID-19  I recommended self-quarantine for 10 days and to watch for symptoms until 14 days after exposure  If patient were to develop symptoms, they should self isolate and call our office for further guidance  I have spent 10 minutes directly with the patient  Greater than 50% of this time was spent in counseling/coordination of care regarding: prognosis, risks and benefits of treatment options, instructions for management, patient and family education, importance of treatment compliance, risk factor reductions and impressions  Encounter provider Irene Dave PA-C    Provider located at [address] [address]    Recent Visits  No visits were found meeting these conditions  Showing recent visits within past 7 days and meeting all other requirements  Today's Visits  Date Type Provider Dept   01/12/22 Telemedicine Irene Dave PA-C [facility]   Showing today's visits and meeting all other requirements  Future Appointments  No visits were found meeting these conditions  Showing future appointments within next 150 days and meeting all other requirements     This virtual check-in was done via [employer] and patient was informed that this is a secure, HIPAA-compliant platform  She agrees to proceed  Patient agrees to participate in a virtual check in via telephone or video visit instead of presenting to the office to address urgent/immediate medical needs  Patient is aware this is a billable service      After connecting through [facility], the patient was identified by name and date of birth  Shan Rojo was informed that this was a telemedicine visit and that the exam was being conducted confidentially over secure lines  Shan Rojo acknowledged consent and understanding of privacy and security of the telemedicine visit  I informed the patient that I have reviewed her record in Epic and presented the opportunity for her to ask any questions regarding the visit today  The patient agreed to participate  Verification of patient location:  Patient is located in the following state in which I hold an active license: [place]    Subjective:   Shan Rojo is a 27 y o  female who is concerned about COVID-19  Patient's symptoms include fever (100 5), chills, sore throat, cough and headache   Patient denies shortness of breath and chest tightness      - Date of symptom onset: 1/11/2022  - Date of exposure: 1/6/2022      COVID-19 vaccination status: Fully vaccinated (primary series)    Exposure:   Contact with a person who is under investigation (PUI) for or who is positive for COVID-19 within the last 14 days?: Yes    Hospitalized recently for fever and/or lower respiratory symptoms?: No      Currently a healthcare worker that is involved in direct patient care?: Yes      Works in a special setting where the risk of COVID-19 transmission may be high? (this may include long-term care, correctional and skilled nursing facilities; homeless shelters; assisted-living facilities and group homes ): Yes      Resident in a special setting where the risk of COVID-19 transmission may be high? (this may include long-term care, correctional and skilled nursing facilities; homeless shelters; assisted-living facilities and group homes ): No      No results found for: Dea Gore, [facility], [address] [place] [address],[address], [facility], [facility]  Past Medical History:   Diagnosis Date    Anxiety     Depression     Hypertension during pregnancy in third trimester 2/25/2021    Urinary tract infection     Varicella      Past Surgical History:   Procedure Laterality Date    APPENDECTOMY      WISDOM TOOTH EXTRACTION       Current Outpatient Medications   Medication Sig Dispense Refill    fluticasone (FLONASE) 50 mcg/act nasal spray 2 sprays into each nostril daily 15 8 mL 11    Prenatal Vit-Fe Fumarate-FA (Prenatal Vitamin Plus Low Iron) 27-1 MG TABS take 1 tablet by mouth once daily 90 tablet 3    sertraline (ZOLOFT) 100 mg tablet Take 1 tablet (100 mg total) by mouth daily 90 tablet 0     No current facility-administered medications for this visit  Allergies   Allergen Reactions    Lac Bovis GI Intolerance    Pollen Extract Other (See Comments)     Post nasal drip       Review of Systems   Constitutional: Positive for chills and fever (100 5)  HENT: Positive for sore throat  Respiratory: Positive for cough  Negative for chest tightness and shortness of breath  Neurological: Positive for headaches  Objective:    Vitals:    01/12/22 1322   Temp: 100 5 °F (38 1 °C)   Weight: 74 8 kg (165 lb)   Height: 5' 4" (1 626 m)       Physical Exam  Vitals reviewed: Pt speaking in clear, fluent sentences without cough or apparent SOB  VIRTUAL VISIT DISCLAIMER    Antonion Leyden verbally agrees to participate in [employer]  Pt is aware that [employer] could be limited without vital signs or the ability to perform a full hands-on physical exam  Carola Bucio understands she or the provider may request at any time to terminate the video visit and request the patient to seek care or treatment in person

## 2022-02-01 ENCOUNTER — OFFICE VISIT (OUTPATIENT)
Dept: POSTPARTUM | Facility: CLINIC | Age: 27
End: 2022-02-01
Payer: COMMERCIAL

## 2022-02-01 DIAGNOSIS — O92.79 PAIN AGGRAVATED BY BREAST FEEDING: ICD-10-CM

## 2022-02-01 DIAGNOSIS — Z71.89 ENCOUNTER FOR BREAST FEEDING COUNSELING: Primary | ICD-10-CM

## 2022-02-01 DIAGNOSIS — R52 PAIN AGGRAVATED BY BREAST FEEDING: ICD-10-CM

## 2022-02-01 DIAGNOSIS — O92.70 LACTATION PROBLEM: ICD-10-CM

## 2022-02-01 PROCEDURE — 99214 OFFICE O/P EST MOD 30 MIN: CPT | Performed by: PEDIATRICS

## 2022-02-01 RX ORDER — DIAPER,BRIEF,ADULT, DISPOSABLE
1 EACH MISCELLANEOUS DAILY
COMMUNITY

## 2022-02-01 NOTE — PATIENT INSTRUCTIONS
Continue to nurse Lisa Cisneros as often as you desire  Pay close attention to achieve a deep latch with her chin and cheeks deep in your breast   Watch her feeding pattern and gently take her off the breast when she is done drinking, or shows signs of frustration  You can offer each breast more than once until she is content  Using gentle breast compressions to increase milk flow can keep her nursing contentedly a little longer  To alleviate pain from vasospasms, apply warmed ointment, a warmed breast pad and warm clothing to the nipple/breast immediately after feeding or pumping  A hot water bottle or heating pad on the lowest setting over the breast for a few minutes can also help  The following supplements can also help with pain from vasospasms:  Calcium 200mg, Magnesium 300mg, Vitamin B6 100mg and B complex vitamin  All to be taken twice a day  To treat the bleb on your right nipple, in addition to applying warm oil/ointment after feeding or pumping, apply a scant amount of 1% hydrocortisone cream with the tip of a toothpick just to the bleb  Then cover the nipple and areola with a piece of wax paper or parchment paper  Do this until the bleb is gone and your nipple is no longer sensitive  Continue pumping as needed to meet your breastfeeding goals  You may want to try using your Spectra pump to allow for more effective pumping which will help increase milk production  Please call if your symptoms are not resolving or with any other concerns

## 2022-02-01 NOTE — PROGRESS NOTES
BREAST FEEDING FOLLOW UP VISIT    Informant/Relationship: Shreya    Discussion of General Lactation Issues: Breastfeeding was going well until about a month ago  Theo Chavez resumed her menses and had an illness which led to dehydration  She also is having higher stress levels due to an employment issue  She saw a significant drop in milk production (50%) at that time  At the same time, Joy Dominguez began to act frustrated when nursing  She would pull on the nipple while feeding and bite  About a week ago, Theo Chavez began having pain about 10 minutes after feeding  The pain is sharp, stabbing and throbbing in the lower quadrants of both breasts and radiates into the nipple  The pain lasts about an hour  Recently, she has decreased the frequency of feeding at the breast due to her pain  She is currently only nursing once at night  Joy Dominguez is less frustrated during the feeding and does not pull on the nipple  She nurses for a long time, about 40 minutes  The feeding is comfortable for Shreya until Joy Dominguez bites (not every feeding )    Infant is almost 8 months old today  Interval Breastfeeding History:    Frequency of breast feeding: once a day currently  Does mother feel breastfeeding is effective: Yes  Does infant appear satisfied after nursing:Yes  Stooling pattern normal:Yes  Urinating frequently:Yes  Using shield or shells:No    Alternative/Artificial Feedings:   Bottle: Yes, while at  2 times a day  Cup: No  Syringe/Finger: No           Formula Type: none                     Amount: n/a            Breast Milk:                      Amount: 4 ounces            Frequency Q 4 Hr between feedings  Elimination Problems: No      Equipment:  Nipple Shield             Type: none             Size: n/a             Frequency of Use: n/a  Pump            Type: [place] and Flavio Frederick            Frequency of Use: Using the [facility] exclusively for about 9 months  Currently pumping twice a day    Expresses about 2 ounces per session  Shells            Type: none            Frequency of use: n/a    Equipment Problems: no      Mom:    Breast: Medium sized symmetrical breasts  Rounded shape  Appropriately spaced  Nipple Assessment in General: Everted nipples bilaterally  Right nipple with a large bleb on the face and a few tiny blebs below it  Left nipple with a small scab on the face  Mother's Awareness of Feeding Cues                 Recognizes: Yes                  Verbalizes: Yes  Support System: FOB, extended family  History of Breastfeeding: none  Changes/Stressors/Violence: Angel Nunez is concerned about her pain and decreased milk production  Concerns/Goals: Shreya desires to resolve her pain, increase milk production and continue breastfeeding until Jan Enamorado is a year old    Problems with Mom: breast pain, decreased milk production  Physical Exam  Constitutional:       Appearance: Normal appearance  HENT:      Head: Normocephalic and atraumatic  Pulmonary:      Effort: Pulmonary effort is normal    Musculoskeletal:         General: Normal range of motion  Cervical back: Normal range of motion and neck supple  Neurological:      Mental Status: She is alert and oriented to person, place, and time  Skin:     General: Skin is warm and dry  Psychiatric:         Mood and Affect: Mood normal          Behavior: Behavior normal          Thought Content: Thought content normal          Judgment: Judgment normal        Handouts:   None    Education:  Reviewed Latch: Discussed how to limit/prevent biting when feeding at the breast  Reviewed Frequency/Supply & Demand: Discussed factors that can lead to decreased milk production  Reviewed Mom/Breast care: Discussed treatment for vasospasms and nipple blebs        Plan:   Reassurance and support given  I acknowledged Shreya's desire to reach her goal of breastfeeding for a year    I suggested that she continue to offer the breast as desired and gave tips for preventing biting, nipple pulling and other sources of trauma  Watson Salud was given instructions for resolving her nipple bleb and symptoms of vasospasm  I recommended she continue pumping as needed to reach her goals  I suggested using her Spectra pump as often as possible which may help increase her milk production more effectively than her [facility] which she feels has not been effectively emptying her breasts  I encouraged her to call for more support if her symptoms are not resolving  I have spent 60 minutes with Patient  today in which greater than 50% of this time was spent in counseling/coordination of care regarding Patient and family education

## 2022-02-09 NOTE — PROGRESS NOTES
I have reviewed the notes, assessments, and/or procedures performed by Flavia Wagoner RN, IBCLC, I concur with her/his documentation of Tip Jeffery MD 02/09/22

## 2022-02-15 DIAGNOSIS — F41.9 ANXIETY: ICD-10-CM

## 2022-02-15 RX ORDER — SERTRALINE HYDROCHLORIDE 100 MG/1
100 TABLET, FILM COATED ORAL DAILY
Qty: 90 TABLET | Refills: 0 | Status: SHIPPED | OUTPATIENT
Start: 2022-02-15 | End: 2022-06-07 | Stop reason: SDUPTHER

## 2022-02-16 ENCOUNTER — TELEMEDICINE (OUTPATIENT)
Dept: FAMILY MEDICINE CLINIC | Facility: CLINIC | Age: 27
End: 2022-02-16
Payer: COMMERCIAL

## 2022-02-16 VITALS — HEIGHT: 64 IN | TEMPERATURE: 102.5 F | BODY MASS INDEX: 28.17 KG/M2 | WEIGHT: 165 LBS

## 2022-02-16 DIAGNOSIS — B34.9 VIRAL INFECTION, UNSPECIFIED: Primary | ICD-10-CM

## 2022-02-16 PROCEDURE — 99213 OFFICE O/P EST LOW 20 MIN: CPT | Performed by: PHYSICIAN ASSISTANT

## 2022-02-16 PROCEDURE — 87636 SARSCOV2 & INF A&B AMP PRB: CPT | Performed by: PHYSICIAN ASSISTANT

## 2022-02-16 PROCEDURE — 1036F TOBACCO NON-USER: CPT | Performed by: PHYSICIAN ASSISTANT

## 2022-02-16 NOTE — PROGRESS NOTES
COVID-19 Outpatient Progress Note    Assessment/Plan:    Problem List Items Addressed This Visit     None      Visit Diagnoses     Viral infection, unspecified    -  Primary    Relevant Orders    Covid/Flu- Mobile [facility] or [facility]         Disposition:     Referred patient to centralized site to test for COVID-19/Influenza  I recommended self-quarantine for 10 days and to watch for symptoms until 14 days after exposure  If patient were to develop symptoms, they should self isolate and call our office for further guidance  Discussed symptom directed medication options with patient  Daughter exposed to influenza at      I have spent 10 minutes directly with the patient  Greater than 50% of this time was spent in counseling/coordination of care regarding: prognosis, risks and benefits of treatment options, instructions for management, patient and family education, importance of treatment compliance, risk factor reductions and impressions  Encounter provider Brittany Slade PA-C    Provider located at [address] [place] [id number]    Recent Visits  No visits were found meeting these conditions  Showing recent visits within past 7 days and meeting all other requirements  Today's Visits  Date Type Provider Dept   02/16/22 Telemedicine Brittany Slade PA-C [facility]   Showing today's visits and meeting all other requirements  Future Appointments  No visits were found meeting these conditions  Showing future appointments within next 150 days and meeting all other requirements     This virtual check-in was done via [facility] and patient was informed that this is a secure, HIPAA-compliant platform  She agrees to proceed  Patient agrees to participate in a virtual check in via telephone or video visit instead of presenting to the office to address urgent/immediate medical needs  Patient is aware this is a billable service      After connecting through [facility], the patient was identified by name and date of birth  Jane Irwin was informed that this was a telemedicine visit and that the exam was being conducted confidentially over secure lines  My office door was closed  Jane Irwin acknowledged consent and understanding of privacy and security of the telemedicine visit  I informed the patient that I have reviewed her record in Epic and presented the opportunity for her to ask any questions regarding the visit today  The patient agreed to participate  Verification of patient location:  Patient is located in the following state in which I hold an active license: [place]    Subjective:   Jane Irwin is a 27 y o  female who is concerned about COVID-19  Patient's symptoms include fever, chills, fatigue, nasal congestion, rhinorrhea, cough, myalgias and headache  Patient denies sore throat, shortness of breath, chest tightness, abdominal pain, nausea, vomiting and diarrhea       - Date of symptom onset: 2/15/2022      COVID-19 vaccination status: Fully vaccinated (primary series)    Exposure:   Contact with a person who is under investigation (PUI) for or who is positive for COVID-19 within the last 14 days?: No    Hospitalized recently for fever and/or lower respiratory symptoms?: No      Lab Results   Component Value Date    SARSCOV2 Negative 01/12/2022     Past Medical History:   Diagnosis Date    Anxiety     Depression     Hypertension during pregnancy in third trimester 2/25/2021    Urinary tract infection     Varicella      Past Surgical History:   Procedure Laterality Date    APPENDECTOMY      WISDOM TOOTH EXTRACTION       Current Outpatient Medications   Medication Sig Dispense Refill    sertraline (ZOLOFT) 100 mg tablet Take 1 tablet (100 mg total) by mouth daily 90 tablet 0    fluticasone (FLONASE) 50 mcg/act nasal spray 2 sprays into each nostril daily (Patient not taking: Reported on 2/16/2022 ) 15 8 mL 11    Lecithin 1200 MG CAPS Take 1 capsule by mouth daily (Patient not taking: Reported on 2/16/2022 )      Prenatal Vit-Fe Fumarate-FA (Prenatal Vitamin Plus Low Iron) 27-1 MG TABS take 1 tablet by mouth once daily (Patient not taking: Reported on 2/16/2022) 90 tablet 3     No current facility-administered medications for this visit  Allergies   Allergen Reactions    Lac Bovis GI Intolerance    Pollen Extract Other (See Comments)     Post nasal drip       Review of Systems   Constitutional: Positive for chills, fatigue and fever  HENT: Positive for congestion and rhinorrhea  Negative for sore throat  Respiratory: Positive for cough  Negative for chest tightness and shortness of breath  Gastrointestinal: Negative for abdominal pain, diarrhea, nausea and vomiting  Musculoskeletal: Positive for myalgias  Neurological: Positive for headaches  Objective:    Vitals:    02/16/22 0958   Temp: (!) 102 5 °F (39 2 °C)   Weight: 74 8 kg (165 lb)   Height: 5' 4" (1 626 m)       Physical Exam  Vitals reviewed  Constitutional:       General: She is not in acute distress  Appearance: She is well-developed  She is ill-appearing  She is not toxic-appearing or diaphoretic  HENT:      Head: Normocephalic and atraumatic  Pulmonary:      Effort: Pulmonary effort is normal  No respiratory distress  Comments: Pt speaking in clear, fluent sentences without cough or apparent SOB  Neurological:      Mental Status: She is alert and oriented to person, place, and time  Psychiatric:         Behavior: Behavior normal  Behavior is cooperative  Thought Content: Thought content normal          Judgment: Judgment normal          VIRTUAL VISIT DISCLAIMER    Angelica Orr verbally agrees to participate in [employer]   Pt is aware that [employer] could be limited without vital signs or the ability to perform a full hands-on physical exam  Carola Almanza understands she or the provider may request at any time to terminate the video visit and request the patient to seek care or treatment in person

## 2022-02-17 LAB
FLUAV RNA RESP QL NAA+PROBE: POSITIVE
FLUBV RNA RESP QL NAA+PROBE: NEGATIVE
SARS-COV-2 RNA RESP QL NAA+PROBE: NEGATIVE

## 2022-02-22 ENCOUNTER — OFFICE VISIT (OUTPATIENT)
Dept: URGENT CARE | Facility: MEDICAL CENTER | Age: 27
End: 2022-02-22
Payer: COMMERCIAL

## 2022-02-22 VITALS
RESPIRATION RATE: 16 BRPM | DIASTOLIC BLOOD PRESSURE: 73 MMHG | TEMPERATURE: 99.4 F | SYSTOLIC BLOOD PRESSURE: 121 MMHG | OXYGEN SATURATION: 97 % | HEART RATE: 83 BPM

## 2022-02-22 DIAGNOSIS — H60.92 OTITIS EXTERNA OF LEFT EAR, UNSPECIFIED CHRONICITY, UNSPECIFIED TYPE: Primary | ICD-10-CM

## 2022-02-22 PROCEDURE — G0382 LEV 3 HOSP TYPE B ED VISIT: HCPCS | Performed by: FAMILY MEDICINE

## 2022-02-22 RX ORDER — NEOMYCIN SULFATE, POLYMYXIN B SULFATE AND HYDROCORTISONE 10; 3.5; 1 MG/ML; MG/ML; [USP'U]/ML
4 SUSPENSION/ DROPS AURICULAR (OTIC) 4 TIMES DAILY
Qty: 5.6 ML | Refills: 0 | Status: SHIPPED | OUTPATIENT
Start: 2022-02-22 | End: 2022-02-24

## 2022-02-22 NOTE — PROGRESS NOTES
[facility] [facility]        NAME: Helena Hercules is a 27 y o  female  : 1995    MRN: [id number]  DATE: 2022  TIME: 9:03 AM    Assessment and Plan   Otitis externa of left ear, unspecified chronicity, unspecified type [H60 92]  1  Otitis externa of left ear, unspecified chronicity, unspecified type  neomycin-polymyxin-hydrocortisone (CORTISPORIN) 0 35%-10,000 units/mL-1% otic suspension         Patient Instructions       Follow up with PCP in 3-5 days  Proceed to  ER if symptoms worsen  Chief Complaint     Chief Complaint   Patient presents with   Alecia Urrutia     Patient relates started with left ear pain since   Last night started with left ear drainage and throbbing pain  She tested positive for influeza A on Wednesday  COVID-19 negative  C/O cough  History of Present Illness       73-year-old female here today with complaint of left earache the last 6 days  Pain is gotten progressively worse  It radiates to the front of her ear  Last night the pain intensify  She also developed some bloody drainage  Denies any fever  Patient also claims she has had a company nasal congestion on and off for the past several days  She informs me that approximately 6 5 days ago she tested positive for influenza A  She is COVID negative  She also describes previous right ear infection several months ago  Since then she has had some residual hearing loss  She has seen an [facility] Th[facility] specialist for this matter is scheduled for follow-up  No other complaints offered      Review of Systems   Review of Systems   Constitutional: Negative  HENT: Positive for ear discharge, ear pain and hearing loss            Current Medications       Current Outpatient Medications:     sertraline (ZOLOFT) 100 mg tablet, Take 1 tablet (100 mg total) by mouth daily, Disp: 90 tablet, Rfl: 0    fluticasone (FLONASE) 50 mcg/act nasal spray, 2 sprays into each nostril daily (Patient not taking: Reported on 2/16/2022 ), Disp: 15 8 mL, Rfl: 11    Lecithin 1200 MG CAPS, Take 1 capsule by mouth daily (Patient not taking: Reported on 2/16/2022 ), Disp: , Rfl:     neomycin-polymyxin-hydrocortisone (CORTISPORIN) 0 35%-10,000 units/mL-1% otic suspension, Administer 4 drops into the left ear 4 (four) times a day for 7 days, Disp: 5 6 mL, Rfl: 0    Prenatal Vit-Fe Fumarate-FA (Prenatal Vitamin Plus Low Iron) 27-1 MG TABS, take 1 tablet by mouth once daily (Patient not taking: Reported on 2/16/2022), Disp: 90 tablet, Rfl: 3    Current Allergies     Allergies as of 02/22/2022 - Reviewed 02/22/2022   Allergen Reaction Noted    Lac bovis GI Intolerance 11/04/2015    Pollen extract Other (See Comments) 06/01/2016            The following portions of the patient's history were reviewed and updated as appropriate: allergies, current medications, past family history, past medical history, past social history, past surgical history and problem list      Past Medical History:   Diagnosis Date    Anxiety     Depression     Hypertension during pregnancy in third trimester 2/25/2021    Urinary tract infection     Varicella        Past Surgical History:   Procedure Laterality Date    APPENDECTOMY      WISDOM TOOTH EXTRACTION         Family History   Problem Relation Age of Onset    Hypertension Mother     No Known Problems Father     No Known Problems Sister     No Known Problems Maternal Grandmother     Prostate cancer Maternal Grandfather     No Known Problems Paternal Grandmother     Prostate cancer Paternal Grandfather     Atrial fibrillation Paternal Grandfather     Breast cancer Neg Hx     Colon cancer Neg Hx     Ovarian cancer Neg Hx          Medications have been verified  Objective   /73   Pulse 83   Temp 99 4 °F (37 4 °C) (Tympanic)   Resp 16   LMP 02/01/2022   SpO2 97%   Patient's last menstrual period was 02/01/2022         Physical Exam     Physical Exam  Vitals and nursing note reviewed  Constitutional:       Appearance: Normal appearance  HENT:      Right Ear: Ear canal normal       Ears:      Comments: Right tympanic membrane is dull, intact  There is evidence of previous scarring  Left tympanic membrane is erythematous, bulging with air-fluid level  Intact  Left ear canal is erythematous swollen tender  Serosanguineous discharge observed  Nose: Nose normal    Musculoskeletal:      Cervical back: Normal range of motion and neck supple  Neurological:      Mental Status: She is alert

## 2022-02-22 NOTE — PATIENT INSTRUCTIONS
I prescribed Cortisporin ear drops 4 drops into left ear 4 times a day for 7 days  Otitis Externa, Ambulatory Care   GENERAL INFORMATION:   Otitis externa , or swimmer's ear, is an infection in the outer ear canal  This canal goes from the outside of the ear to the eardrum  Common symptoms include the following:   · Ear pain    · Outer ear canal is red and swollen    · Clear fluid or pus is leaking out of your ear    · Outer ear canal is itchy and you see a rash    · Trouble hearing because your ear is plugged    · Feel a bump in your ear canal, called a polyp    · Flakes of skin fall from your ear  Seek immediate care for the following symptoms:   · Fever    · Severe ear pain    · Sudden inability to hear at all    · New swelling in your face, behind your ears, or in your neck    · Sudden inability to move part of your face    · Sudden numbness in your face  Treatment for otitis externa  may include any of the following:  · NSAIDs  help decrease swelling and pain or fever  This medicine is available with or without a doctor's order  NSAIDs can cause stomach bleeding or kidney problems in certain people  If you take blood thinner medicine, always ask your healthcare provider if NSAIDs are safe for you  Always read the medicine label and follow directions  · Ear drops  are a combination of a steroid medicine and an antibiotic  The steroid helps decrease redness, swelling, and pain  The antibiotic helps kill the germs that caused your ear infection  · Ear wicking  may remove fluid or wax from your outer ear canal  Your healthcare provider may insert a small tube, called a wick, into your ear to help drain fluid  A wick also may be used to put medicine into your ear canal if the canal is blocked  Follow the steps below to use eardrops:   · Lie down on your side with your infected ear facing up  · Carefully drip the correct number of eardrops into your ear   Have another person help you if possible  · Gently move the outside part of your ear back and forth to help the medicine reach your ear canal      · Stay lying down in the same position (with your ear facing up) for 3 to 5 minutes  Prevent otitis externa:   · Do not put cotton swabs or foreign objects in your ears  · Wrap a clean moist washcloth around your finger, and use it to clean your outer ear and remove extra ear wax  · Use ear plugs when you swim  Dry your outer ears completely after you swim or bathe  Follow up with your healthcare provider as directed:  Write down your questions so you remember to ask them during your visits  CARE AGREEMENT:   You have the right to help plan your care  Learn about your health condition and how it may be treated  Discuss treatment options with your caregivers to decide what care you want to receive  You always have the right to refuse treatment  The above information is an  only  It is not intended as medical advice for individual conditions or treatments  Talk to your doctor, nurse or pharmacist before following any medical regimen to see if it is safe and effective for you  © 2014 [address] is for End User's use only and may not be sold, redistributed or otherwise used for commercial purposes  All illustrations and images included in CareNotes® are the copyrighted property of [employer]  or Nehemias Jaime

## 2022-02-24 ENCOUNTER — TELEPHONE (OUTPATIENT)
Dept: FAMILY MEDICINE CLINIC | Facility: CLINIC | Age: 27
End: 2022-02-24

## 2022-02-24 ENCOUNTER — OFFICE VISIT (OUTPATIENT)
Dept: FAMILY MEDICINE CLINIC | Facility: CLINIC | Age: 27
End: 2022-02-24
Payer: COMMERCIAL

## 2022-02-24 VITALS
SYSTOLIC BLOOD PRESSURE: 114 MMHG | TEMPERATURE: 97.9 F | BODY MASS INDEX: 28.82 KG/M2 | WEIGHT: 168.8 LBS | HEIGHT: 64 IN | DIASTOLIC BLOOD PRESSURE: 68 MMHG | OXYGEN SATURATION: 98 % | HEART RATE: 79 BPM

## 2022-02-24 DIAGNOSIS — H60.502 ACUTE OTITIS EXTERNA OF LEFT EAR, UNSPECIFIED TYPE: Primary | ICD-10-CM

## 2022-02-24 PROCEDURE — 99213 OFFICE O/P EST LOW 20 MIN: CPT | Performed by: PHYSICIAN ASSISTANT

## 2022-02-24 PROCEDURE — 3008F BODY MASS INDEX DOCD: CPT | Performed by: PHYSICIAN ASSISTANT

## 2022-02-24 RX ORDER — CIPROFLOXACIN AND DEXAMETHASONE 3; 1 MG/ML; MG/ML
4 SUSPENSION/ DROPS AURICULAR (OTIC) 2 TIMES DAILY
Qty: 7.5 ML | Refills: 0 | Status: SHIPPED | OUTPATIENT
Start: 2022-02-24

## 2022-02-24 NOTE — TELEPHONE ENCOUNTER
Pt needs OV          ----- Message from Almita Argueta sent at 2/24/2022  8:08 AM EST -----  Regarding: FW: Earache     ----- Message -----  From: Krys Riddle  Sent: 2/24/2022   8:02 AM EST  To: [facility]  Subject: Earache                                          Hi, I had a positive flu test last Thursday Monday I went to care now for extreme earache  He gave me antibiotic ear drops, Ive had no relief and my ear is still draining a lot with pain and hearing loss  I am prone to ear infections, it seems like z packs are the only remedy  Attached is a picture of saturated cotton from my ear from just last night

## 2022-02-24 NOTE — PROGRESS NOTES
Assessment/Plan:    Problem List Items Addressed This Visit     None      Visit Diagnoses     Acute otitis externa of left ear, unspecified type    -  Primary    Relevant Medications    ciprofloxacin-dexamethasone (CIPRODEX) otic suspension    BMI 28 0-28 9,adult               Diagnoses and all orders for this visit:    Acute otitis externa of left ear, unspecified type  -     ciprofloxacin-dexamethasone (CIPRODEX) otic suspension; Administer 4 drops into the left ear 2 (two) times a day    BMI 28 0-28 9,adult        No problem-specific Assessment & Plan notes found for this encounter  Subjective:      Patient ID: Gio Walker is a 27 y o  female  Colon Tolbert is here today complaining of persistent pain in L ear along with purulent/bloody drainage  Went to urgent care, given cortisporin otic drops without any relief  The following portions of the patient's history were reviewed and updated as appropriate:   She has a past medical history of Anxiety, Depression, Hypertension during pregnancy in third trimester (2/25/2021), Urinary tract infection, and Varicella  ,  does not have any pertinent problems on file  ,   has a past surgical history that includes Appendectomy and [place] tooth extraction  ,  family history includes Atrial fibrillation in her paternal grandfather; Hypertension in her mother; No Known Problems in her father, maternal grandmother, paternal grandmother, and sister; Prostate cancer in her maternal grandfather and paternal grandfather  ,   reports that she has never smoked  She has never used smokeless tobacco  She reports that she does not drink alcohol and does not use drugs  ,  is allergic to lac bovis and pollen extract     Current Outpatient Medications   Medication Sig Dispense Refill    sertraline (ZOLOFT) 100 mg tablet Take 1 tablet (100 mg total) by mouth daily 90 tablet 0    ciprofloxacin-dexamethasone (CIPRODEX) otic suspension Administer 4 drops into the left ear 2 (two) times a day 7 5 mL 0    fluticasone (FLONASE) 50 mcg/act nasal spray 2 sprays into each nostril daily (Patient not taking: Reported on 2/16/2022 ) 15 8 mL 11    Lecithin 1200 MG CAPS Take 1 capsule by mouth daily (Patient not taking: Reported on 2/16/2022 )      Prenatal Vit-Fe Fumarate-FA (Prenatal Vitamin Plus Low Iron) 27-1 MG TABS take 1 tablet by mouth once daily (Patient not taking: Reported on 2/16/2022) 90 tablet 3     No current facility-administered medications for this visit  Review of Systems   Constitutional: Negative for activity change, appetite change, chills, diaphoresis, fatigue, fever and unexpected weight change  HENT: Positive for ear discharge and ear pain  Negative for congestion, postnasal drip, rhinorrhea, sinus pressure, sinus pain, sneezing, sore throat, tinnitus and voice change  Eyes: Negative for pain, redness and visual disturbance  Respiratory: Negative for cough, chest tightness, shortness of breath and wheezing  Cardiovascular: Negative for chest pain, palpitations and leg swelling  Gastrointestinal: Negative for abdominal pain, blood in stool, constipation, diarrhea, nausea and vomiting  Genitourinary: Negative for difficulty urinating, dysuria, frequency, hematuria and urgency  Musculoskeletal: Negative for arthralgias, back pain, gait problem, joint swelling, myalgias, neck pain and neck stiffness  Skin: Negative for color change, pallor, rash and wound  Neurological: Negative for dizziness, tremors, weakness, light-headedness and headaches  Psychiatric/Behavioral: Negative for dysphoric mood, self-injury, sleep disturbance and suicidal ideas  The patient is not nervous/anxious  Objective:  Vitals:    02/24/22 1324   BP: 114/68   Pulse: 79   Temp: 97 9 °F (36 6 °C)   SpO2: 98%   Weight: 76 6 kg (168 lb 12 8 oz)   Height: 5' 4" (1 626 m)     Body mass index is 28 97 kg/m²  Physical Exam  Vitals reviewed     Constitutional:       General: She is not in acute distress  Appearance: She is well-developed  She is not diaphoretic  HENT:      Head: Normocephalic and atraumatic  Right Ear: Hearing, tympanic membrane, ear canal and external ear normal       Left Ear: Decreased hearing noted  Drainage, swelling and tenderness present  A middle ear effusion is present  Tympanic membrane is injected  Mouth/Throat:      Pharynx: Uvula midline  No oropharyngeal exudate  Eyes:      General: No scleral icterus  Right eye: No discharge  Left eye: No discharge  Conjunctiva/sclera: Conjunctivae normal    Neck:      Thyroid: No thyromegaly  Vascular: No carotid bruit  Cardiovascular:      Rate and Rhythm: Normal rate and regular rhythm  Heart sounds: Normal heart sounds  No murmur heard  Pulmonary:      Effort: Pulmonary effort is normal  No respiratory distress  Breath sounds: Normal breath sounds  No wheezing  Abdominal:      General: Bowel sounds are normal  There is no distension  Palpations: Abdomen is soft  There is no mass  Tenderness: There is no abdominal tenderness  There is no guarding or rebound  Musculoskeletal:         General: No tenderness  Normal range of motion  Cervical back: Neck supple  Lymphadenopathy:      Cervical: No cervical adenopathy  Skin:     General: Skin is warm and dry  Findings: No erythema or rash  Neurological:      Mental Status: She is alert and oriented to person, place, and time  Psychiatric:         Behavior: Behavior normal          Thought Content: Thought content normal          Judgment: Judgment normal          BMI Counseling: Body mass index is 28 97 kg/m²  The BMI is above normal  Nutrition recommendations include reducing portion sizes, decreasing overall calorie intake and 3-5 servings of fruits/vegetables daily  Exercise recommendations include exercising 3-5 times per week

## 2022-03-26 ENCOUNTER — OFFICE VISIT (OUTPATIENT)
Dept: URGENT CARE | Facility: CLINIC | Age: 27
End: 2022-03-26
Payer: COMMERCIAL

## 2022-03-26 VITALS — HEART RATE: 80 BPM | OXYGEN SATURATION: 97 % | RESPIRATION RATE: 18 BRPM | TEMPERATURE: 98.1 F

## 2022-03-26 DIAGNOSIS — B00.1 COLD SORE: ICD-10-CM

## 2022-03-26 DIAGNOSIS — H60.502 ACUTE OTITIS EXTERNA OF LEFT EAR, UNSPECIFIED TYPE: Primary | ICD-10-CM

## 2022-03-26 DIAGNOSIS — H66.91 RIGHT OTITIS MEDIA, UNSPECIFIED OTITIS MEDIA TYPE: ICD-10-CM

## 2022-03-26 PROCEDURE — G0382 LEV 3 HOSP TYPE B ED VISIT: HCPCS | Performed by: PHYSICIAN ASSISTANT

## 2022-03-26 RX ORDER — CEFDINIR 300 MG/1
300 CAPSULE ORAL EVERY 12 HOURS SCHEDULED
Qty: 20 CAPSULE | Refills: 0 | Status: SHIPPED | OUTPATIENT
Start: 2022-03-26 | End: 2022-04-05

## 2022-03-26 RX ORDER — OFLOXACIN 3 MG/ML
10 SOLUTION AURICULAR (OTIC) DAILY
Qty: 10 ML | Refills: 0 | Status: SHIPPED | OUTPATIENT
Start: 2022-03-26 | End: 2022-04-02

## 2022-03-26 NOTE — PROGRESS NOTES
[facility]        NAME: Titi De La Torre is a 27 y o  female  : 1995    MRN: [id number]  DATE: 2022  TIME: 8:51 AM    Assessment and Plan   Acute otitis externa of left ear, unspecified type [H60 502]  1  Acute otitis externa of left ear, unspecified type  ofloxacin (FLOXIN) 0 3 % otic solution   2  Right otitis media, unspecified otitis media type  cefdinir (OMNICEF) 300 mg capsule   3  Cold sore           Patient Instructions       Follow up with PCP in 3-5 days  Proceed to  ER if symptoms worsen  Chief Complaint     Chief Complaint   Patient presents with    Earache     Pt c/o left ear pain since last night  Pt also c/o a cold sore on her lip for two days  History of Present Illness       Patient is a 27year old female presenting to [facility] with persistent left ear pain  Patient was treated about 1 month again for left otitis externa with Ciprodex drops  Patient reports symptoms improved for a short period but did return  No current drainage but decreased hearing and excruciating pain  Pt also reports developing a cold sore within the past day or so  Fever of 100 last evening  Earache   There is pain in the left ear  This is a recurrent problem  The current episode started 1 to 4 weeks ago  The problem occurs constantly  The problem has been gradually worsening  The maximum temperature recorded prior to her arrival was 100 4 - 100 9 F  The pain is severe  Associated symptoms include hearing loss and rhinorrhea  Pertinent negatives include no abdominal pain, coughing, rash, sore throat or vomiting  Review of Systems   Review of Systems   Constitutional: Positive for fever  Negative for chills  HENT: Positive for ear pain, hearing loss and rhinorrhea  Negative for sore throat  Eyes: Negative for pain and visual disturbance  Respiratory: Negative for cough and shortness of breath  Cardiovascular: Negative for chest pain and palpitations  Gastrointestinal: Negative for abdominal pain and vomiting  Genitourinary: Negative for dysuria and hematuria  Musculoskeletal: Negative for arthralgias and back pain  Skin: Negative for color change and rash  Neurological: Negative for seizures and syncope  All other systems reviewed and are negative          Current Medications       Current Outpatient Medications:     cefdinir (OMNICEF) 300 mg capsule, Take 1 capsule (300 mg total) by mouth every 12 (twelve) hours for 10 days, Disp: 20 capsule, Rfl: 0    ciprofloxacin-dexamethasone (CIPRODEX) otic suspension, Administer 4 drops into the left ear 2 (two) times a day, Disp: 7 5 mL, Rfl: 0    fluticasone (FLONASE) 50 mcg/act nasal spray, 2 sprays into each nostril daily (Patient not taking: Reported on 2/16/2022 ), Disp: 15 8 mL, Rfl: 11    Lecithin 1200 MG CAPS, Take 1 capsule by mouth daily (Patient not taking: Reported on 2/16/2022 ), Disp: , Rfl:     ofloxacin (FLOXIN) 0 3 % otic solution, Administer 10 drops into the left ear daily for 7 days, Disp: 10 mL, Rfl: 0    Prenatal Vit-Fe Fumarate-FA (Prenatal Vitamin Plus Low Iron) 27-1 MG TABS, take 1 tablet by mouth once daily (Patient not taking: Reported on 2/16/2022), Disp: 90 tablet, Rfl: 3    sertraline (ZOLOFT) 100 mg tablet, Take 1 tablet (100 mg total) by mouth daily, Disp: 90 tablet, Rfl: 0    Current Allergies     Allergies as of 03/26/2022 - Reviewed 03/26/2022   Allergen Reaction Noted    Lac bovis GI Intolerance 11/04/2015    Pollen extract Other (See Comments) 06/01/2016            The following portions of the patient's history were reviewed and updated as appropriate: allergies, current medications, past family history, past medical history, past social history, past surgical history and problem list      Past Medical History:   Diagnosis Date    Anxiety     Depression     Hypertension during pregnancy in third trimester 2/25/2021    Urinary tract infection     Varicella Past Surgical History:   Procedure Laterality Date    APPENDECTOMY      WISDOM TOOTH EXTRACTION         Family History   Problem Relation Age of Onset    Hypertension Mother     No Known Problems Father     No Known Problems Sister     No Known Problems Maternal Grandmother     Prostate cancer Maternal Grandfather     No Known Problems Paternal Grandmother     Prostate cancer Paternal Grandfather     Atrial fibrillation Paternal Grandfather     Breast cancer Neg Hx     Colon cancer Neg Hx     Ovarian cancer Neg Hx          Medications have been verified  Objective   Pulse 80   Temp 98 1 °F (36 7 °C)   Resp 18   SpO2 97%   No LMP recorded  Physical Exam     Physical Exam  Constitutional:       Appearance: Normal appearance  HENT:      Head: Normocephalic and atraumatic  Right Ear: Tympanic membrane is injected, erythematous and bulging  Left Ear: Drainage, swelling and tenderness present  Ears:      Comments: Left TM not visualized due to canal edema  Nose: Congestion present  Mouth/Throat:      Lips: Lesions present  Mouth: Mucous membranes are moist    Eyes:      Extraocular Movements: Extraocular movements intact  Conjunctiva/sclera: Conjunctivae normal       Pupils: Pupils are equal, round, and reactive to light  Cardiovascular:      Rate and Rhythm: Normal rate  Pulmonary:      Effort: Pulmonary effort is normal    Musculoskeletal:         General: Normal range of motion  Cervical back: Normal range of motion and neck supple  Skin:     General: Skin is warm and dry  Capillary Refill: Capillary refill takes less than 2 seconds  Neurological:      General: No focal deficit present  Mental Status: She is alert and oriented to person, place, and time     Psychiatric:         Mood and Affect: Mood normal          Behavior: Behavior normal

## 2022-06-07 DIAGNOSIS — F41.9 ANXIETY: ICD-10-CM

## 2022-06-07 RX ORDER — SERTRALINE HYDROCHLORIDE 100 MG/1
100 TABLET, FILM COATED ORAL DAILY
Qty: 90 TABLET | Refills: 0 | Status: SHIPPED | OUTPATIENT
Start: 2022-06-07

## 2022-08-10 ENCOUNTER — TELEPHONE (OUTPATIENT)
Dept: FAMILY MEDICINE CLINIC | Facility: CLINIC | Age: 27
End: 2022-08-10

## 2022-08-10 NOTE — TELEPHONE ENCOUNTER
Started with symptoms yesterday and tested positive today  Having fever 102 0,sore throat, body aches, breathing is fine

## 2022-08-11 ENCOUNTER — TELEMEDICINE (OUTPATIENT)
Dept: FAMILY MEDICINE CLINIC | Facility: CLINIC | Age: 27
End: 2022-08-11
Payer: COMMERCIAL

## 2022-08-11 VITALS — HEIGHT: 64 IN | TEMPERATURE: 100 F | WEIGHT: 165 LBS | BODY MASS INDEX: 28.17 KG/M2

## 2022-08-11 DIAGNOSIS — U07.1 COVID-19: Primary | ICD-10-CM

## 2022-08-11 PROCEDURE — 99214 OFFICE O/P EST MOD 30 MIN: CPT | Performed by: PHYSICIAN ASSISTANT

## 2022-08-11 PROCEDURE — 3725F SCREEN DEPRESSION PERFORMED: CPT | Performed by: PHYSICIAN ASSISTANT

## 2022-08-11 NOTE — PROGRESS NOTES
COVID-19 Outpatient Progress Note    Assessment/Plan:    Problem List Items Addressed This Visit    None     Visit Diagnoses     COVID-19    -  Primary         Disposition:     Patient has COVID-19 infection  Based off CDC guidelines, they were recommended to isolate for 5 days from the date of the positive test  If they remain asymptomatic, isolation may be ended followed by 5 days of wearing a mask when around othes to minimize risk of infecting others  If they have a fever, continue to stay home until fever resolves for at least 24 hours  Discussed symptom directed medication options with patient  I have spent 10 minutes directly with the patient  Greater than 50% of this time was spent in counseling/coordination of care regarding: diagnostic results, prognosis, risks and benefits of treatment options, instructions for management, patient and family education, importance of treatment compliance, risk factor reductions and impressions  Encounter provider Donna Snowden PA-C    Provider located at [address] [id number]    Recent Visits  Date Type Provider Dept   08/10/22 Telephone [facility]   Showing recent visits within past 7 days and meeting all other requirements  Today's Visits  Date Type Provider Dept   08/11/22 Telemedicine Donna Snowden PA-C [facility]   Showing today's visits and meeting all other requirements  Future Appointments  No visits were found meeting these conditions  Showing future appointments within next 150 days and meeting all other requirements       The patient was identified by name and date of birth  Crayton Leyden was informed that this is a telemedicine visit and that the visit is being conducted through [address] [place] [address] Now and patient was informed that this is a secure, HIPAA-compliant platform  She agrees to proceed     My office door was closed  No one else was in the room    She acknowledged consent and understanding of privacy and security of the video platform  The patient has agreed to participate and understands they can discontinue the visit at any time  Patient is aware this is a billable service  Patient agrees to participate in a virtual check in via telephone or video visit instead of presenting to the office to address urgent/immediate medical needs  Patient is aware this is a billable service  After connecting through [place], the patient was identified by name and date of birth  Marj Rodriguez was informed that this was a telemedicine visit and that the exam was being conducted confidentially over secure lines  My office door was closed  No one else was in the room  Marj Rodrgiuez acknowledged consent and understanding of privacy and security of the telemedicine visit  I informed the patient that I have reviewed her record in Epic and presented the opportunity for her to ask any questions regarding the visit today  The patient agreed to participate  Verification of patient location:  Patient is located in the following state in which I hold an active license: [place]    Subjective:   Marj Rodriguez is a 32 y o  female who has been screened for COVID-19  Patient's symptoms include fever (Tmax 102 yesterday), fatigue, nasal congestion, rhinorrhea, cough, shortness of breath (with exertion), chest tightness (with exertion), nausea, myalgias and headache  Patient denies anosmia, loss of taste, abdominal pain, vomiting and diarrhea  - Date of symptom onset: 8/9/2022  - Date of positive COVID-19 test: 8/10/2022  Type of test: Home antigen  Patient with typical symptoms of COVID-19 and they attest that they were positive on home rapid antigen testing  Image of positive result is not able to be uploaded into their chart  COVID-19 vaccination status: Fully vaccinated (primary series)    Sydnie Cramer has been staying home and has isolated themselves in her home   She is taking care to not share personal items and is cleaning all surfaces that are touched often, like counters, tabletops, and doorknobs using household cleaning sprays or wipes  She is wearing a mask when she leaves her room  Lab Results   Component Value Date    SARSCOV2 Negative 02/16/2022     Past Medical History:   Diagnosis Date    Anxiety     Depression     Hypertension during pregnancy in third trimester 2/25/2021    Urinary tract infection     Varicella      Past Surgical History:   Procedure Laterality Date    APPENDECTOMY      WISDOM TOOTH EXTRACTION       Current Outpatient Medications   Medication Sig Dispense Refill    fluticasone (FLONASE) 50 mcg/act nasal spray 2 sprays into each nostril daily 15 8 mL 11    sertraline (ZOLOFT) 100 mg tablet Take 1 tablet (100 mg total) by mouth daily 90 tablet 0    ciprofloxacin-dexamethasone (CIPRODEX) otic suspension Administer 4 drops into the left ear 2 (two) times a day (Patient not taking: Reported on 8/11/2022) 7 5 mL 0    Lecithin 1200 MG CAPS Take 1 capsule by mouth daily (Patient not taking: No sig reported)      Prenatal Vit-Fe Fumarate-FA (Prenatal Vitamin Plus Low Iron) 27-1 MG TABS take 1 tablet by mouth once daily (Patient not taking: No sig reported) 90 tablet 3     No current facility-administered medications for this visit  Allergies   Allergen Reactions    Lac Bovis GI Intolerance    Pollen Extract Other (See Comments)     Post nasal drip       Review of Systems   Constitutional: Positive for fatigue and fever (Tmax 102 yesterday)  HENT: Positive for congestion and rhinorrhea  Respiratory: Positive for cough, chest tightness (with exertion) and shortness of breath (with exertion)  Gastrointestinal: Positive for nausea  Negative for abdominal pain, diarrhea and vomiting  Musculoskeletal: Positive for myalgias  Neurological: Positive for headaches       Objective:    Vitals:    08/11/22 1041   Temp: 100 °F (37 8 °C) Weight: 74 8 kg (165 lb)   Height: 5' 4" (1 626 m)       Physical Exam  Vitals reviewed  Constitutional:       General: She is not in acute distress  Appearance: She is well-developed and normal weight  She is ill-appearing  She is not toxic-appearing or diaphoretic  HENT:      Head: Normocephalic and atraumatic  Pulmonary:      Effort: Pulmonary effort is normal  No respiratory distress  Comments: Pt speaking in clear, fluent sentences without cough or apparent SOB  Neurological:      Mental Status: She is alert and oriented to person, place, and time  Psychiatric:         Behavior: Behavior normal  Behavior is cooperative  Thought Content:  Thought content normal          Judgment: Judgment normal

## 2022-09-06 ENCOUNTER — OFFICE VISIT (OUTPATIENT)
Dept: FAMILY MEDICINE CLINIC | Facility: CLINIC | Age: 27
End: 2022-09-06
Payer: COMMERCIAL

## 2022-09-06 VITALS
WEIGHT: 180 LBS | TEMPERATURE: 97.6 F | SYSTOLIC BLOOD PRESSURE: 118 MMHG | HEIGHT: 64 IN | DIASTOLIC BLOOD PRESSURE: 70 MMHG | BODY MASS INDEX: 30.73 KG/M2 | OXYGEN SATURATION: 98 % | HEART RATE: 72 BPM

## 2022-09-06 DIAGNOSIS — N39.0 URINARY TRACT INFECTION WITHOUT HEMATURIA, SITE UNSPECIFIED: Primary | ICD-10-CM

## 2022-09-06 LAB
SL AMB  POCT GLUCOSE, UA: ABNORMAL
SL AMB LEUKOCYTE ESTERASE,UA: 2
SL AMB POCT BILIRUBIN,UA: ABNORMAL
SL AMB POCT BLOOD,UA: ABNORMAL
SL AMB POCT CLARITY,UA: ABNORMAL
SL AMB POCT COLOR,UA: YELLOW
SL AMB POCT KETONES,UA: 1
SL AMB POCT NITRITE,UA: ABNORMAL
SL AMB POCT PH,UA: 6
SL AMB POCT SPECIFIC GRAVITY,UA: 1.02
SL AMB POCT URINE PROTEIN: ABNORMAL
SL AMB POCT UROBILINOGEN: ABNORMAL

## 2022-09-06 PROCEDURE — 99213 OFFICE O/P EST LOW 20 MIN: CPT | Performed by: PHYSICIAN ASSISTANT

## 2022-09-06 PROCEDURE — 87086 URINE CULTURE/COLONY COUNT: CPT | Performed by: PHYSICIAN ASSISTANT

## 2022-09-06 PROCEDURE — 81002 URINALYSIS NONAUTO W/O SCOPE: CPT | Performed by: PHYSICIAN ASSISTANT

## 2022-09-06 RX ORDER — NITROFURANTOIN 25; 75 MG/1; MG/1
100 CAPSULE ORAL 2 TIMES DAILY
Qty: 14 CAPSULE | Refills: 0 | Status: SHIPPED | OUTPATIENT
Start: 2022-09-06 | End: 2022-09-13

## 2022-09-06 NOTE — PROGRESS NOTES
Assessment/Plan:    Problem List Items Addressed This Visit    None     Visit Diagnoses     Urinary tract infection without hematuria, site unspecified    -  Primary    Relevant Medications    nitrofurantoin (MACROBID) 100 mg capsule    Other Relevant Orders    POCT urine dip (Completed)    Urine culture           Diagnoses and all orders for this visit:    Urinary tract infection without hematuria, site unspecified  -     POCT urine dip  -     Urine culture; Future  -     Urine culture  -     nitrofurantoin (MACROBID) 100 mg capsule; Take 1 capsule (100 mg total) by mouth 2 (two) times a day for 7 days      Urine dip positive for UTI  Will send out culture and sensitivity  Empirically started on macrobid  Push fluids  She will notify us if symptoms do not improve or worsen    Subjective:      Patient ID: Gio Walker is a 32 y o  female  Isaiah Tolbert is a pleasant 32year old female who is here today complaining of burning with urination, frequency, and urgency since yesterday  She denies any blood in her urine, flank pain, fevers, chills, nausea, vomiting or diarrhea  The following portions of the patient's history were reviewed and updated as appropriate:   She has a past medical history of Anxiety, Depression, Hypertension during pregnancy in third trimester (2/25/2021), Urinary tract infection, and Varicella  ,  does not have any pertinent problems on file  ,   has a past surgical history that includes Appendectomy and [place] tooth extraction  ,  family history includes Atrial fibrillation in her paternal grandfather; Hypertension in her mother; No Known Problems in her father, maternal grandmother, paternal grandmother, and sister; Prostate cancer in her maternal grandfather and paternal grandfather  ,   reports that she has never smoked  She has never used smokeless tobacco  She reports that she does not drink alcohol and does not use drugs  ,  is allergic to lac bovis and pollen extract     Current Outpatient Medications   Medication Sig Dispense Refill    nitrofurantoin (MACROBID) 100 mg capsule Take 1 capsule (100 mg total) by mouth 2 (two) times a day for 7 days 14 capsule 0    sertraline (ZOLOFT) 100 mg tablet Take 1 tablet (100 mg total) by mouth daily 90 tablet 0    ciprofloxacin-dexamethasone (CIPRODEX) otic suspension Administer 4 drops into the left ear 2 (two) times a day (Patient not taking: No sig reported) 7 5 mL 0    fluticasone (FLONASE) 50 mcg/act nasal spray 2 sprays into each nostril daily (Patient not taking: Reported on 9/6/2022) 15 8 mL 11    Lecithin 1200 MG CAPS Take 1 capsule by mouth daily (Patient not taking: No sig reported)      Prenatal Vit-Fe Fumarate-FA (Prenatal Vitamin Plus Low Iron) 27-1 MG TABS take 1 tablet by mouth once daily (Patient not taking: No sig reported) 90 tablet 3     No current facility-administered medications for this visit  Review of Systems   Constitutional: Negative for chills, diaphoresis, fatigue and fever  HENT: Negative for congestion, ear pain, postnasal drip, rhinorrhea, sneezing, sore throat and trouble swallowing  Eyes: Negative for pain and visual disturbance  Respiratory: Negative for apnea, cough, shortness of breath and wheezing  Cardiovascular: Negative for chest pain and palpitations  Gastrointestinal: Negative for abdominal pain, constipation, diarrhea, nausea and vomiting  Genitourinary: Positive for dysuria, frequency and urgency  Negative for hematuria  Musculoskeletal: Negative for arthralgias, gait problem and myalgias  Neurological: Negative for dizziness, syncope, weakness, light-headedness, numbness and headaches  Psychiatric/Behavioral: Negative for suicidal ideas  The patient is not nervous/anxious  Objective:  Vitals:    09/06/22 1049   BP: 118/70   Pulse: 72   Temp: 97 6 °F (36 4 °C)   SpO2: 98%   Weight: 81 6 kg (180 lb)   Height: 5' 4" (1 626 m)     Body mass index is 30 9 kg/m²       Physical Exam  Vitals and nursing note reviewed  Constitutional:       Appearance: She is well-developed  HENT:      Head: Normocephalic and atraumatic  Right Ear: External ear normal       Left Ear: External ear normal       Nose: Nose normal    Eyes:      Extraocular Movements: Extraocular movements intact  Cardiovascular:      Rate and Rhythm: Normal rate and regular rhythm  Heart sounds: Normal heart sounds  No murmur heard  No friction rub  No gallop  Pulmonary:      Effort: Pulmonary effort is normal  No respiratory distress  Breath sounds: Normal breath sounds  No wheezing or rales  Abdominal:      General: Bowel sounds are normal       Palpations: Abdomen is soft  Tenderness: There is no abdominal tenderness  There is no guarding or rebound  Musculoskeletal:         General: Normal range of motion  Cervical back: Normal range of motion and neck supple  Lymphadenopathy:      Cervical: No cervical adenopathy  Skin:     General: Skin is warm and dry  Neurological:      Mental Status: She is alert and oriented to person, place, and time  Psychiatric:         Behavior: Behavior normal          Thought Content:  Thought content normal          Judgment: Judgment normal

## 2022-09-08 LAB — BACTERIA UR CULT: NORMAL

## 2022-09-18 ENCOUNTER — OFFICE VISIT (OUTPATIENT)
Dept: URGENT CARE | Facility: CLINIC | Age: 27
End: 2022-09-18
Payer: COMMERCIAL

## 2022-09-18 VITALS — HEART RATE: 88 BPM | OXYGEN SATURATION: 99 % | RESPIRATION RATE: 18 BRPM | TEMPERATURE: 98.1 F

## 2022-09-18 DIAGNOSIS — H65.05 RECURRENT ACUTE SEROUS OTITIS MEDIA OF LEFT EAR: Primary | ICD-10-CM

## 2022-09-18 PROCEDURE — 99213 OFFICE O/P EST LOW 20 MIN: CPT | Performed by: STUDENT IN AN ORGANIZED HEALTH CARE EDUCATION/TRAINING PROGRAM

## 2022-09-18 RX ORDER — AMOXICILLIN AND CLAVULANATE POTASSIUM 875; 125 MG/1; MG/1
1 TABLET, FILM COATED ORAL EVERY 12 HOURS SCHEDULED
Qty: 14 TABLET | Refills: 0 | Status: SHIPPED | OUTPATIENT
Start: 2022-09-18 | End: 2022-09-25

## 2022-09-18 RX ORDER — SACCHAROMYCES BOULARDII 250 MG
250 CAPSULE ORAL 2 TIMES DAILY
Qty: 28 CAPSULE | Refills: 0 | Status: SHIPPED | OUTPATIENT
Start: 2022-09-18 | End: 2022-10-02

## 2022-09-18 NOTE — PROGRESS NOTES
[facility]        NAME: Kervin Vela is a [place] y o  female  : 1995    MRN: [id number]  DATE: 2022  TIME: 4:45 PM    Assessment and Orders   Recurrent acute serous otitis media of left ear [H65 05]  1  Recurrent acute serous otitis media of left ear  amoxicillin-clavulanate (AUGMENTIN) 875-125 mg per tablet    saccharomyces boulardii (FLORASTOR) 250 mg capsule         Plan and Discussion      Symptoms and exam consistent with recurrent otitis media  Patient states that this is her 3rd infection in the last few months  Patient will follow-up with ENT to discuss possible to placement  Will treat with 7 days of Augmentin as well as probiotics to help with gut health  Risks and benefits discussed  Patient understands and agrees with the plan  Follow up with PCP  Chief Complaint     Chief Complaint   Patient presents with    Earache     Pt c/o left ear pain since yesterday  History of Present Illness       Earache   There is pain in the left ear  This is a new problem  The current episode started today  The problem occurs constantly  The problem has been unchanged  There has been no fever  Associated symptoms include hearing loss and neck pain (Pain radiating down her jaw)  Pertinent negatives include no abdominal pain, coughing or sore throat  Her past medical history is significant for a chronic ear infection  There is no history of a tympanostomy tube  Review of Systems   Review of Systems   HENT: Positive for ear pain and hearing loss  Negative for sore throat  Respiratory: Negative for cough  Gastrointestinal: Negative for abdominal pain  Musculoskeletal: Positive for neck pain (Pain radiating down her jaw)           Current Medications       Current Outpatient Medications:     amoxicillin-clavulanate (AUGMENTIN) 875-125 mg per tablet, Take 1 tablet by mouth every 12 (twelve) hours for 7 days, Disp: 14 tablet, Rfl: 0    saccharomyces boulardii (FLORASTOR) 250 mg capsule, Take 1 capsule (250 mg total) by mouth 2 (two) times a day for 14 days, Disp: 28 capsule, Rfl: 0    ciprofloxacin-dexamethasone (CIPRODEX) otic suspension, Administer 4 drops into the left ear 2 (two) times a day (Patient not taking: No sig reported), Disp: 7 5 mL, Rfl: 0    fluticasone (FLONASE) 50 mcg/act nasal spray, 2 sprays into each nostril daily (Patient not taking: Reported on 9/6/2022), Disp: 15 8 mL, Rfl: 11    Lecithin 1200 MG CAPS, Take 1 capsule by mouth daily (Patient not taking: No sig reported), Disp: , Rfl:     Prenatal Vit-Fe Fumarate-FA (Prenatal Vitamin Plus Low Iron) 27-1 MG TABS, take 1 tablet by mouth once daily (Patient not taking: No sig reported), Disp: 90 tablet, Rfl: 3    sertraline (ZOLOFT) 100 mg tablet, Take 1 tablet (100 mg total) by mouth daily, Disp: 90 tablet, Rfl: 0    Current Allergies     Allergies as of 09/18/2022 - Reviewed 09/18/2022   Allergen Reaction Noted    Lac bovis GI Intolerance 11/04/2015    Pollen extract Other (See Comments) 06/01/2016            The following portions of the patient's history were reviewed and updated as appropriate: allergies, current medications, past family history, past medical history, past social history, past surgical history and problem list      Past Medical History:   Diagnosis Date    Anxiety     Depression     Hypertension during pregnancy in third trimester 2/25/2021    Urinary tract infection     Varicella        Past Surgical History:   Procedure Laterality Date    APPENDECTOMY      WISDOM TOOTH EXTRACTION         Family History   Problem Relation Age of Onset    Hypertension Mother     No Known Problems Father     No Known Problems Sister     No Known Problems Maternal Grandmother     Prostate cancer Maternal Grandfather     No Known Problems Paternal Grandmother     Prostate cancer Paternal Grandfather     Atrial fibrillation Paternal Grandfather     Breast cancer Neg Hx     Colon cancer Neg Hx     Ovarian cancer Neg Hx          Medications have been verified  Objective   Pulse 88   Temp 98 1 °F (36 7 °C)   Resp 18   SpO2 99%   No LMP recorded  Physical Exam     Physical Exam  Constitutional:       General: She is not in acute distress  HENT:      Head: Normocephalic and atraumatic  Left Ear: External ear normal  Tympanic membrane is injected, erythematous and retracted  Tympanic membrane is not bulging  Cardiovascular:      Rate and Rhythm: Normal rate and regular rhythm  Pulmonary:      Effort: No respiratory distress  Skin:     General: Skin is warm  Neurological:      General: No focal deficit present  Mental Status: She is alert and oriented to person, place, and time     Psychiatric:         Mood and Affect: Mood normal          Behavior: Behavior normal                Paul Jones DO

## 2022-09-20 ENCOUNTER — TELEPHONE (OUTPATIENT)
Dept: FAMILY MEDICINE CLINIC | Facility: CLINIC | Age: 27
End: 2022-09-20

## 2022-09-27 ENCOUNTER — OFFICE VISIT (OUTPATIENT)
Dept: FAMILY MEDICINE CLINIC | Facility: CLINIC | Age: 27
End: 2022-09-27
Payer: COMMERCIAL

## 2022-09-27 VITALS
TEMPERATURE: 97.3 F | WEIGHT: 181 LBS | HEIGHT: 64 IN | BODY MASS INDEX: 30.9 KG/M2 | HEART RATE: 75 BPM | SYSTOLIC BLOOD PRESSURE: 110 MMHG | OXYGEN SATURATION: 96 % | DIASTOLIC BLOOD PRESSURE: 74 MMHG

## 2022-09-27 DIAGNOSIS — E55.9 VITAMIN D DEFICIENCY: ICD-10-CM

## 2022-09-27 DIAGNOSIS — E78.2 MIXED HYPERLIPIDEMIA: ICD-10-CM

## 2022-09-27 DIAGNOSIS — Z00.00 WELL ADULT EXAM: Primary | ICD-10-CM

## 2022-09-27 DIAGNOSIS — Z13.89 SCREENING FOR MULTIPLE CONDITIONS: ICD-10-CM

## 2022-09-27 DIAGNOSIS — F41.9 ANXIETY: ICD-10-CM

## 2022-09-27 DIAGNOSIS — Z28.21 REFUSED INFLUENZA VACCINE: ICD-10-CM

## 2022-09-27 PROCEDURE — 99395 PREV VISIT EST AGE 18-39: CPT | Performed by: PHYSICIAN ASSISTANT

## 2022-09-27 PROCEDURE — 3725F SCREEN DEPRESSION PERFORMED: CPT | Performed by: PHYSICIAN ASSISTANT

## 2022-09-27 RX ORDER — SERTRALINE HYDROCHLORIDE 100 MG/1
100 TABLET, FILM COATED ORAL DAILY
Qty: 90 TABLET | Refills: 0 | Status: SHIPPED | OUTPATIENT
Start: 2022-09-27 | End: 2022-10-05 | Stop reason: SDUPTHER

## 2022-09-27 NOTE — PATIENT INSTRUCTIONS

## 2022-09-27 NOTE — PROGRESS NOTES
[address] PRIMARY CARE    NAME: Helena Hercules  AGE: 32 y o  SEX: female  : 1995     DATE: 2022     Assessment and Plan:     Problem List Items Addressed This Visit        Other    Hyperlipidemia    Relevant Orders    Lipid Panel with Direct LDL reflex    Vitamin D deficiency    Relevant Orders    Vitamin D 25 hydroxy    Anxiety    Relevant Medications    sertraline (ZOLOFT) 100 mg tablet      Other Visit Diagnoses     Well adult exam    -  Primary    BMI 31 0-31 9,adult        Refused influenza vaccine        Screening for multiple conditions        Relevant Orders    CBC    Comprehensive metabolic panel          Immunizations and preventive care screenings were discussed with patient today  Appropriate education was printed on patient's after visit summary  Counseling:  Dental Health: discussed importance of regular tooth brushing, flossing, and dental visits  Return in about 3 months (around 2022)  Chief Complaint:     Chief Complaint   Patient presents with    Annual Exam     Denies flu vaccine       History of Present Illness:     Adult Annual Physical   Patient here for a comprehensive physical exam  The patient reports no problems  Diet and Physical Activity  Diet/Nutrition: well balanced diet  Exercise: moderate cardiovascular exercise        Depression Screening  PHQ-2/9 Depression Screening    Little interest or pleasure in doing things: 0 - not at all  Feeling down, depressed, or hopeless: 0 - not at all  Trouble falling or staying asleep, or sleeping too much: 0 - not at all  Feeling tired or having little energy: 0 - not at all  Poor appetite or overeatin - not at all  Feeling bad about yourself - or that you are a failure or have let yourself or your family down: 0 - not at all  Trouble concentrating on things, such as reading the newspaper or watching television: 0 - not at all  Moving or speaking so slowly that other people could have noticed  Or the opposite - being so fidgety or restless that you have been moving around a lot more than usual: 0 - not at all  Thoughts that you would be better off dead, or of hurting yourself in some way: 0 - not at all  PHQ-9 Score: 0   PHQ-9 Interpretation: No or Minimal depression        General Health  Sleep: sleeps well  Hearing: normal - bilateral   Vision: no vision problems  Dental: regular dental visits  /GYN Health  Pt overdue for Gyn exam, she will call to schedule  Review of Systems:     Review of Systems   Constitutional: Negative for activity change, appetite change, chills, diaphoresis, fatigue, fever and unexpected weight change  HENT: Negative for congestion, ear pain, postnasal drip, rhinorrhea, sinus pressure, sinus pain, sneezing, sore throat, tinnitus and voice change  Eyes: Negative for pain, redness and visual disturbance  Respiratory: Negative for cough, chest tightness, shortness of breath and wheezing  Cardiovascular: Negative for chest pain, palpitations and leg swelling  Gastrointestinal: Negative for abdominal pain, blood in stool, constipation, diarrhea, nausea and vomiting  Genitourinary: Negative for difficulty urinating, dysuria, frequency, hematuria and urgency  Musculoskeletal: Negative for arthralgias, back pain, gait problem, joint swelling, myalgias, neck pain and neck stiffness  Skin: Negative for color change, pallor, rash and wound  Neurological: Negative for dizziness, tremors, weakness, light-headedness and headaches  Psychiatric/Behavioral: Negative for dysphoric mood, self-injury, sleep disturbance and suicidal ideas  The patient is not nervous/anxious         Past Medical History:     Past Medical History:   Diagnosis Date    Anxiety     Depression     Hypertension during pregnancy in third trimester 2/25/2021    Urinary tract infection     Varicella       Past Surgical History:     Past Surgical History:   Procedure Laterality Date    APPENDECTOMY      WISDOM TOOTH EXTRACTION        Social History:     Social History     Socioeconomic History    Marital status: /Civil Union     Spouse name: None    Number of children: None    Years of education: None    Highest education level: None   Occupational History    None   Tobacco Use    Smoking status: Never Smoker    Smokeless tobacco: Never Used   Vaping Use    Vaping Use: Never used   Substance and Sexual Activity    Alcohol use: Never     Comment: prior to pregnancy    Drug use: Never    Sexual activity: Yes     Partners: Male     Birth control/protection: Condom Male   Other Topics Concern    None   Social History Narrative    None     Social Determinants of Health     Financial Resource Strain: Not on file   Food Insecurity: Not on file   Transportation Needs: Not on file   Physical Activity: Not on file   Stress: Not on file   Social Connections: Not on file   Intimate Partner Violence: Not on file   Housing Stability: Not on file      Family History:     Family History   Problem Relation Age of Onset    Hypertension Mother     No Known Problems Father     No Known Problems Sister     No Known Problems Maternal Grandmother     Prostate cancer Maternal Grandfather     No Known Problems Paternal Grandmother     Prostate cancer Paternal Grandfather     Atrial fibrillation Paternal Grandfather     Breast cancer Neg Hx     Colon cancer Neg Hx     Ovarian cancer Neg Hx       Current Medications:     Current Outpatient Medications   Medication Sig Dispense Refill    fluticasone (FLONASE) 50 mcg/act nasal spray 2 sprays into each nostril daily 15 8 mL 11    saccharomyces boulardii (FLORASTOR) 250 mg capsule Take 1 capsule (250 mg total) by mouth 2 (two) times a day for 14 days 28 capsule 0    sertraline (ZOLOFT) 100 mg tablet Take 1 tablet (100 mg total) by mouth daily 90 tablet 0     No current facility-administered medications for this visit  Allergies: Allergies   Allergen Reactions    Lac Bovis GI Intolerance    Pollen Extract Other (See Comments)     Post nasal drip      Physical Exam:     /74   Pulse 75   Temp (!) 97 3 °F (36 3 °C)   Ht 5' 4" (1 626 m)   Wt 82 1 kg (181 lb)   SpO2 96%   BMI 31 07 kg/m²     Physical Exam  Vitals and nursing note reviewed  Constitutional:       General: She is not in acute distress  Appearance: She is well-developed  HENT:      Head: Normocephalic and atraumatic  Eyes:      Conjunctiva/sclera: Conjunctivae normal    Cardiovascular:      Rate and Rhythm: Normal rate and regular rhythm  Heart sounds: No murmur heard  Pulmonary:      Effort: Pulmonary effort is normal  No respiratory distress  Breath sounds: Normal breath sounds  Abdominal:      Palpations: Abdomen is soft  Tenderness: There is no abdominal tenderness  Musculoskeletal:      Cervical back: Neck supple  Skin:     General: Skin is warm and dry  Neurological:      Mental Status: She is alert            Roby Thompson PA-C   [facility] PRIMARY CARE

## 2022-09-28 ENCOUNTER — TELEPHONE (OUTPATIENT)
Dept: FAMILY MEDICINE CLINIC | Facility: CLINIC | Age: 27
End: 2022-09-28

## 2022-09-28 NOTE — TELEPHONE ENCOUNTER
No idea why they would say it, especially because I just called it in yesterday  Also, I doubt that [facility] can transfer a script to [facility]        ----- Message from Jorgito Kim sent at 9/28/2022  2:01 PM EDT -----  Regarding: FW: Serteraline script     ----- Message -----  From: Tremaine Vasquez  Sent: 9/28/2022   2:00 PM EDT  To: KUMAR BERNALASAD [facility] Primary Care Clinical  Subject: Serteraline script                               Hello, [facility] [address] [place] and [facility] had told me the script was canceled by my doctor  Just wanted to see why

## 2022-10-05 DIAGNOSIS — F41.9 ANXIETY: ICD-10-CM

## 2022-10-05 RX ORDER — SERTRALINE HYDROCHLORIDE 100 MG/1
100 TABLET, FILM COATED ORAL DAILY
Qty: 90 TABLET | Refills: 0 | Status: SHIPPED | OUTPATIENT
Start: 2022-10-05

## 2022-11-07 ENCOUNTER — HOSPITAL ENCOUNTER (EMERGENCY)
Facility: HOSPITAL | Age: 27
Discharge: HOME | End: 2022-11-07
Attending: EMERGENCY MEDICINE

## 2022-11-07 VITALS
TEMPERATURE: 97.5 F | WEIGHT: 184.08 LBS | RESPIRATION RATE: 18 BRPM | BODY MASS INDEX: 31.43 KG/M2 | HEART RATE: 84 BPM | SYSTOLIC BLOOD PRESSURE: 125 MMHG | HEIGHT: 64 IN | DIASTOLIC BLOOD PRESSURE: 77 MMHG | OXYGEN SATURATION: 98 %

## 2022-11-07 DIAGNOSIS — H66.91 RIGHT OTITIS MEDIA: Primary | ICD-10-CM

## 2022-11-07 RX ORDER — AMOXICILLIN AND CLAVULANATE POTASSIUM 875; 125 MG/1; MG/1
1 TABLET, FILM COATED ORAL EVERY 12 HOURS
Qty: 20 TABLET | Refills: 0 | Status: SHIPPED | OUTPATIENT
Start: 2022-11-07 | End: 2022-11-17

## 2022-11-07 RX ORDER — NAPROXEN 500 MG/1
500 TABLET ORAL 2 TIMES DAILY WITH MEALS
Qty: 30 TABLET | Refills: 0 | Status: SHIPPED | OUTPATIENT
Start: 2022-11-07

## 2022-11-07 RX ORDER — AMOXICILLIN AND CLAVULANATE POTASSIUM 875; 125 MG/1; MG/1
1 TABLET, FILM COATED ORAL ONCE
Status: COMPLETED | OUTPATIENT
Start: 2022-11-07 | End: 2022-11-07

## 2022-11-07 RX ORDER — OXYCODONE HYDROCHLORIDE 10 MG/1
10 TABLET ORAL ONCE
Status: COMPLETED | OUTPATIENT
Start: 2022-11-07 | End: 2022-11-07

## 2022-11-07 RX ADMIN — OXYCODONE HYDROCHLORIDE 10 MG: 10 TABLET ORAL at 01:46

## 2022-11-07 RX ADMIN — AMOXICILLIN AND CLAVULANATE POTASSIUM 1 TABLET: 875; 125 TABLET, FILM COATED ORAL at 01:45

## 2022-11-07 NOTE — Clinical Note
Reji Gonzalez was seen and treated in our emergency department on 11/7/2022  Diagnosis: Right Otitis Media    Clive Ortiz  is off the rest of the shift today  She may return on this date: If you have any questions or concerns, please don't hesitate to call        Jaylyn Bose,     ______________________________           _______________          _______________  Hospital Representative                              Date                                Time

## 2022-11-07 NOTE — DISCHARGE INSTRUCTIONS
You have been seen for a right ear infection  Please complete the course of antibiotics as prescribed  Return to the emergency department if you develop worsening pain, fevers, headaches, visual changes, vomiting, neck stiffness or any other symptoms of concern  Please follow up with ENT by calling the number provided

## 2022-11-07 NOTE — ED PROVIDER NOTES
History  Chief Complaint   Patient presents with   • Earache     Earache started thrusday night right side  Pt has chronic ear inf, has appt with ENT today  Pt believes is causing dizziness, pt using oflaxacin drops in from ENT  Pt currently light headed  Pt states pain radiates into jaw and neck  Josselin Caldwell is a 32y o  year old female with PMH of recurrent ear infections presenting to the [facility] ED for right ear pain  Patient has had three days of worsening pain in right ear  Described as a pressure, 10/10 and radiating down  Worse when opening/closing jaw  Associated fevers and headaches  Patient denies visual changes, neck/throat swelling  Patient with history of recurrent ear infections and following with ENT as outpatient  Patient has taken tylenol, motrin and used previously prescribed ofloxacin drops at home for the past three days  History provided by:  Patient   used: No        Prior to Admission Medications   Prescriptions Last Dose Informant Patient Reported?  Taking?   fluticasone (FLONASE) 50 mcg/act nasal spray   No No   Si sprays into each nostril daily   ofloxacin (FLOXIN) 0 3 % otic solution   No No   Sig: Administer 5 drops into the left ear 2 (two) times a day   sertraline (ZOLOFT) 100 mg tablet   No No   Sig: Take 1 tablet (100 mg total) by mouth daily      Facility-Administered Medications: None       Past Medical History:   Diagnosis Date   • Anxiety    • Depression    • Hypertension during pregnancy in third trimester 2021   • Urinary tract infection    • Varicella        Past Surgical History:   Procedure Laterality Date   • APPENDECTOMY     • WISDOM TOOTH EXTRACTION         Family History   Problem Relation Age of Onset   • Hypertension Mother    • No Known Problems Father    • No Known Problems Sister    • No Known Problems Maternal Grandmother    • Prostate cancer Maternal Grandfather    • No Known Problems Paternal Grandmother    • Prostate cancer Paternal Grandfather    • Atrial fibrillation Paternal Grandfather    • Breast cancer Neg Hx    • Colon cancer Neg Hx    • Ovarian cancer Neg Hx      I have reviewed and agree with the history as documented  E-Cigarette/Vaping   • E-Cigarette Use Never User      E-Cigarette/Vaping Substances   • Nicotine No    • THC No    • CBD No    • Flavoring No    • Other No    • Unknown No      Social History     Tobacco Use   • Smoking status: Never Smoker   • Smokeless tobacco: Never Used   Vaping Use   • Vaping Use: Never used   Substance Use Topics   • Alcohol use: Never     Comment: prior to pregnancy   • Drug use: Yes     Types: Marijuana     Comment: medical card       Review of Systems   Constitutional: Positive for fever  HENT: Positive for ear pain and sore throat  Negative for congestion, ear discharge and rhinorrhea  Eyes: Negative for redness  Respiratory: Negative for cough and shortness of breath  Cardiovascular: Negative for chest pain  Gastrointestinal: Negative for abdominal pain, diarrhea, nausea and vomiting  Endocrine: Negative for polyuria  Genitourinary: Negative for dysuria  Musculoskeletal: Negative for myalgias  Skin: Negative for rash  Neurological: Positive for headaches  Psychiatric/Behavioral: Negative for confusion  All other systems reviewed and are negative  Physical Exam  Physical Exam  Vitals and nursing note reviewed  Constitutional:       General: She is not in acute distress  Appearance: Normal appearance  She is well-developed  She is not ill-appearing, toxic-appearing or diaphoretic  HENT:      Head: Normocephalic and atraumatic  Right Ear: External ear normal  Decreased hearing noted  No drainage, swelling or tenderness  A middle ear effusion is present  No mastoid tenderness  Tympanic membrane is injected and erythematous  Tympanic membrane is not perforated  Left Ear:  No middle ear effusion   Tympanic membrane is not injected or erythematous  Nose: No congestion or rhinorrhea  Eyes:      General:         Right eye: No discharge  Left eye: No discharge  Cardiovascular:      Rate and Rhythm: Normal rate and regular rhythm  Pulmonary:      Effort: Pulmonary effort is normal  No accessory muscle usage or respiratory distress  Breath sounds: Normal breath sounds  No stridor  No decreased breath sounds, wheezing, rhonchi or rales  Abdominal:      General: There is no distension  Palpations: Abdomen is soft  Tenderness: There is no abdominal tenderness  There is no guarding or rebound  Musculoskeletal:      Cervical back: Normal range of motion and neck supple  No edema, erythema, rigidity or crepitus  Right lower leg: No tenderness  Left lower leg: No tenderness  Lymphadenopathy:      Cervical: No cervical adenopathy  Skin:     Capillary Refill: Capillary refill takes less than 2 seconds  Findings: No rash  Neurological:      Mental Status: She is alert and oriented to person, place, and time     Psychiatric:         Mood and Affect: Mood normal          Behavior: Behavior normal          Vital Signs  ED Triage Vitals [11/07/22 0123]   Temperature Pulse Respirations Blood Pressure SpO2   97 5 °F (36 4 °C) 88 18 125/77 98 %      Temp Source Heart Rate Source Patient Position - Orthostatic VS BP Location FiO2 (%)   Temporal Monitor -- Left arm --      Pain Score       10 - Worst Possible Pain           Vitals:    11/07/22 0123 11/07/22 0145   BP: 125/77    Pulse: 88 84         Visual Acuity      ED Medications  Medications   oxyCODONE (ROXICODONE) immediate release tablet 10 mg (10 mg Oral Given 11/7/22 0146)   amoxicillin-clavulanate (AUGMENTIN) 875-125 mg per tablet 1 tablet (1 tablet Oral Given 11/7/22 0145)       Diagnostic Studies  Results Reviewed     None                 No orders to display              Procedures  Procedures         ED Course MDM  Number of Diagnoses or Management Options  Right otitis media  Diagnosis management comments:   32 y o  female presenting for right ear pain  VSS, nontoxic appearing  No mastoid tenderness on exam   Exam and history concerning for recurrent oititis media, will give Rx for augmentin and analgesia  Patient agreeable to forgo labs and CT at this time as patient established with ENT and appointment scheduled for later this morning  I have discussed with the patient our plan to discharge them from the ED and the patient is in agreement with this plan  The patient was provided a written after visit summary with strict RTED precautions  Discharge Plan: Rx for naproxen, augmentin  Followup: I have discussed with the patient plan to follow up with ENT  Appointment scheduled for this morning  Contact information provided in AVS        Amount and/or Complexity of Data Reviewed  Review and summarize past medical records: yes    Patient Progress  Patient progress: stable      Disposition  Final diagnoses:   Right otitis media     Time reflects when diagnosis was documented in both MDM as applicable and the Disposition within this note     Time User Action Codes Description Comment    11/7/2022  1:42 AM Cary Watson [[id number]] Right otitis media       ED Disposition     ED Disposition   Discharge    Condition   Stable    Date/Time   Mon Nov 7, 2022  1:41 AM    Jesus Moeller discharge to home/self care                 Follow-up Information     Follow up With Specialties Details [address], DO Otolaryngology Go today  [address]  [address] [place] [address]  [phone]            Discharge Medication List as of 11/7/2022  1:43 AM      START taking these medications    Details   amoxicillin-clavulanate (AUGMENTIN) 875-125 mg per tablet Take 1 tablet by mouth every 12 (twelve) hours for 10 days, Starting Mon 11/7/2022, Until Thu 11/17/2022, Normal      naproxen (Naprosyn) 500 mg tablet Take 1 tablet (500 mg total) by mouth 2 (two) times a day with meals, Starting Mon 11/7/2022, Normal         CONTINUE these medications which have NOT CHANGED    Details   fluticasone (FLONASE) 50 mcg/act nasal spray 2 sprays into each nostril daily, Starting Tue 6/7/2022, Normal      ofloxacin (FLOXIN) 0 3 % otic solution Administer 5 drops into the left ear 2 (two) times a day, Starting Thu 10/6/2022, Normal      sertraline (ZOLOFT) 100 mg tablet Take 1 tablet (100 mg total) by mouth daily, Starting Wed 10/5/2022, Normal             No discharge procedures on file      PDMP Review     None          ED Provider  Electronically Signed by           Prakash Cisneros DO  11/07/22 0159

## 2023-01-09 DIAGNOSIS — F41.9 ANXIETY: ICD-10-CM

## 2023-01-09 RX ORDER — SERTRALINE HYDROCHLORIDE 100 MG/1
100 TABLET, FILM COATED ORAL DAILY
Qty: 90 TABLET | Refills: 0 | Status: SHIPPED | OUTPATIENT
Start: 2023-01-09

## 2023-05-15 DIAGNOSIS — F41.9 ANXIETY: ICD-10-CM

## 2023-05-15 RX ORDER — SERTRALINE HYDROCHLORIDE 100 MG/1
100 TABLET, FILM COATED ORAL DAILY
Qty: 90 TABLET | Refills: 0 | Status: SHIPPED | OUTPATIENT
Start: 2023-05-15

## 2023-07-03 NOTE — LACTATION NOTE
INR drawn at [facility] where patient is residing. Not notified of any medication or condition changes for patient. INR 2.6.  Written orders per Dr. JUVENTINO Hoover protocol faxed to [facility]. On site provider is Dr CAMILO Baird.  
This note was copied from a baby's chart  C/O sore nipples  Information given about sore nipples and how to correct with positioning techniques  Discussed maneuvers to latch infant on properly to avoid nipple pain and promote healing  Discussed treatments that could be utilized to promote healing  Hydro gel dressings given with instruction and verbalization of understanding of cleaning and duration of use  Mom nursing on left breast using football hold when entered the room  States feeding is uncomfortable throughout feed  Asked to break suction to assess for nipple distortion  Nipple appears flat on bottom  With mom's permission,  Worked on positioning infant up at chest level and starting to feed infant with nose arriving at the nipple  Then, using areolar compression to achieve a deep latch that is comfortable and exchanges optimum amounts of milk  Guided to deeper latch quickly on left breast still using football hold  Mom noted less discomfort immediately  Shown signs of deeper latch and better suckling  Baby with relaxed tone after feed  Nipple appears pink and round  Met with mother and father to go over discharge breastfeeding booklet including the feeding log  Emphasized 8 or more (12) feedings in a 24 hour period, what to expect for the number of diapers per day of life and the progression of properties of the  stooling pattern  Reviewed breastfeeding and your lifestyle, storage and preparation of breast milk, how to keep you breast pump clean, the employed breastfeeding mother and paced bottle feeding handouts  Booklet included Breastfeeding Resources for after discharge including access to the number for the [phone]  Encouraged parents to call for assistance, questions, and concerns about breastfeeding  Extension provided 
This note was copied from a baby's chart  Met with mother  Provided mother with Ready, Set, Baby booklet  Discussed Skin to Skin contact an benefits to mom and baby  Talked about the delay of the first bath until baby has adjusted  Spoke about the benefits of rooming in  Feeding on cue and what that means for recognizing infant's hunger  Avoidance of pacifiers for the first month discussed  Talked about exclusive breastfeeding for the first 6 months  Positioning and latch reviewed as well as showing images of other feeding positions  Discussed the properties of a good latch in any position  Reviewed hand/manual expression  Discussed s/s that baby is getting enough milk and some s/s that breastfeeding dyad may need further help  Gave information on common concerns, what to expect the first few weeks after delivery, preparing for other caregivers, and how partners can help  Resources for support also provided  Mother verbalized breastfeeding is going fairly well  She has bruises on her left areola  I explained that is a sign that baby was not latched well  I demo  football hold, how to hand express and how to get a deep latch  BAby took a few sucks but was sleepy  We attempted for 15 minutes, I suggested we take a break and enc mom to watch for feeding cues  Enc to call for assistance as needed,phone # given 
This note was copied from a baby's chart  Mom called in to help wake and latch baby  Information on hand expression given  Discussed benefits of knowing how to manually express breast including stimulating milk supply, softening nipple for latch and evacuating breast in the event of engorgement  Worked on positioning infant up at chest level and starting to feed infant with nose arriving at the nipple  Then, using areolar compression to achieve a deep latch that is comfortable and exchanges optimum amounts of milk  Deep latch and stimulate till suckling well on right breast using football hold  Mom seemed pleased with session  No family at bedside at this time  Baby to nursery for double phototherapy after feed  Encor[employer] MOB  to call for assistance, questions and concerns  Extension number for inpatient lactation support provided 
Render Note In Bullet Format When Appropriate: No
Number Of Freeze-Thaw Cycles: 1 freeze-thaw cycle
Duration Of Freeze Thaw-Cycle (Seconds): 10
Detail Level: Detailed
Consent: The patient's consent was obtained including but not limited to risks of crusting, scabbing, blistering, scarring, darker or lighter pigmentary change, recurrence, incomplete removal and infection.
Render Post-Care Instructions In Note?: yes
Application Tool (Optional): Liquid Nitrogen Sprayer
Post-Care Instructions: I reviewed with the patient in detail post-care instructions. Patient is to wear sunprotection, and avoid picking at any of the treated lesions. Pt may apply Vaseline to crusted or scabbing areas.

## 2023-08-21 ENCOUNTER — TELEPHONE (OUTPATIENT)
Dept: OBGYN CLINIC | Facility: MEDICAL CENTER | Age: 28
End: 2023-08-21

## 2023-08-21 DIAGNOSIS — Z32.01 POSITIVE PREGNANCY TEST: Primary | ICD-10-CM

## 2023-08-21 NOTE — TELEPHONE ENCOUNTER
Spoke with patient regarding positive pregnancy test and next steps. Patient made aware of blood work that needs to be completed on or after 8/28. Patient will be around 6 weeks then. Patient aware once we get results and are reviewed by provider, we will call with next steps. Orders placed.

## 2023-08-21 NOTE — TELEPHONE ENCOUNTER
Pt called. Pregnancy test positive on Thursday. LMP: 07/17. Is aware that we deliver in [facility]. Please review when you have a chance, ty!

## 2023-08-28 ENCOUNTER — APPOINTMENT (OUTPATIENT)
Dept: LAB | Facility: CLINIC | Age: 28
End: 2023-08-28
Payer: COMMERCIAL

## 2023-08-28 DIAGNOSIS — Z32.01 POSITIVE PREGNANCY TEST: ICD-10-CM

## 2023-08-28 LAB
B-HCG SERPL-ACNC: 6489 MIU/ML (ref 0–5)
PROGEST SERPL-MCNC: 11.16 NG/ML

## 2023-08-28 PROCEDURE — 84144 ASSAY OF PROGESTERONE: CPT

## 2023-08-28 PROCEDURE — 84702 CHORIONIC GONADOTROPIN TEST: CPT

## 2023-08-28 PROCEDURE — 36415 COLL VENOUS BLD VENIPUNCTURE: CPT

## 2023-08-29 ENCOUNTER — TELEPHONE (OUTPATIENT)
Dept: OBGYN CLINIC | Facility: CLINIC | Age: 28
End: 2023-08-29

## 2023-08-29 ENCOUNTER — TELEPHONE (OUTPATIENT)
Dept: OBGYN CLINIC | Facility: MEDICAL CENTER | Age: 28
End: 2023-08-29

## 2023-08-29 DIAGNOSIS — Z32.01 POSITIVE PREGNANCY TEST: Primary | ICD-10-CM

## 2023-08-29 NOTE — TELEPHONE ENCOUNTER
Left message for patient to call office. Patient needs dating US at 8 weeks;    LMP 07/17. If no office appointments available, I can place order for central scheduling.   Please provide patient with number to call

## 2023-09-12 ENCOUNTER — HOSPITAL ENCOUNTER (OUTPATIENT)
Dept: ULTRASOUND IMAGING | Facility: HOSPITAL | Age: 28
Discharge: HOME | End: 2023-09-12
Payer: COMMERCIAL

## 2023-09-12 DIAGNOSIS — Z32.01 POSITIVE PREGNANCY TEST: ICD-10-CM

## 2023-09-12 PROCEDURE — 76801 OB US < 14 WKS SINGLE FETUS: CPT

## 2023-09-14 ENCOUNTER — TELEPHONE (OUTPATIENT)
Dept: OBGYN CLINIC | Facility: CLINIC | Age: 28
End: 2023-09-14

## 2023-09-14 DIAGNOSIS — Z34.81 PRENATAL CARE, SUBSEQUENT PREGNANCY, FIRST TRIMESTER: Primary | ICD-10-CM

## 2023-09-22 ENCOUNTER — PATIENT MESSAGE (OUTPATIENT)
Dept: OBGYN CLINIC | Facility: MEDICAL CENTER | Age: 28
End: 2023-09-22

## 2023-09-22 ENCOUNTER — INITIAL PRENATAL (OUTPATIENT)
Dept: OBGYN CLINIC | Facility: MEDICAL CENTER | Age: 28
End: 2023-09-22

## 2023-09-22 VITALS
WEIGHT: 178.8 LBS | BODY MASS INDEX: 30.52 KG/M2 | SYSTOLIC BLOOD PRESSURE: 116 MMHG | HEIGHT: 64 IN | DIASTOLIC BLOOD PRESSURE: 72 MMHG

## 2023-09-22 DIAGNOSIS — R11.0 NAUSEA: Primary | ICD-10-CM

## 2023-09-22 DIAGNOSIS — Z34.81 PRENATAL CARE, SUBSEQUENT PREGNANCY, FIRST TRIMESTER: Primary | ICD-10-CM

## 2023-09-22 DIAGNOSIS — O21.9 NAUSEA AND VOMITING DURING PREGNANCY: Primary | ICD-10-CM

## 2023-09-22 PROCEDURE — OBC

## 2023-09-22 RX ORDER — DOXYLAMINE SUCCINATE AND PYRIDOXINE HYDROCHLORIDE, DELAYED RELEASE TABLETS 10 MG/10 MG 10; 10 MG/1; MG/1
TABLET, DELAYED RELEASE ORAL
Qty: 60 TABLET | Refills: 1 | Status: SHIPPED | OUTPATIENT
Start: 2023-09-22

## 2023-09-22 NOTE — PROGRESS NOTES
OB History    Para Term  AB Living   2 1 1 0 0 1   SAB IAB Ectopic Multiple Live Births   0 0 0 0 1      # Outcome Date GA Lbr Anirudh/2nd Weight Sex Delivery Anes PTL Lv   2 Current            1 Term 21 37w0d / 01:45 3055 g (6 lb 11.8 oz) F Vag-Spont EPI N JESS         * Pt presents for OB intake  *  *Pt's LMP was Patient's last menstrual period was 2023. *Ultrasound date:23   7weeks 3days  *Estimated date of delivery: 2024   * confirmed by LMP    *Signs/Symptoms of Pregnancy   *yes Constipation    *no Headaches   *no Cramping  *no Spotting  *yes Nausea      Diabetes     *no Hx of GDM    *no BMI >35    *no First degree relative with type 2 diabetes    *no Hx of PCOS     Hypertension-    *no Hx of chronic HTN    *yes Hx of gestational HTN   *no hx of preeclampsia, eclampsia, or HELLP syndrome     *Infection Screening   *no Does the pt have a hx of MRSA? *no History of herpes? *Immunizations:   *yes Discussed influenza vaccine   *yes Discussed TDaP vaccine   *yes COVID Vaccine x1    SOCIOECONOMIC:  Mental/Physical/Sexual Abuse  *no  Housing Security  *no  Food Security  *no  Speacial Needs/Challenges  *no  Substance Use Disorder   ETOH   *no    OPIOD   *no     THC *yes    Other: no      ACTIVE MEDICAL/MENTAL HEALTH CONDITIONS  Depression *yes   Anxiety*yes  Medications*yes  -Patient reports to be coping well.      *Interview education   *Handouts given:    *[facility] support center     *Lab Locations    * [employer] Childbirth [facility] Parenting Classes    *Schedule for Prenatal Visits    *Pregnancy Warning Signs Reviewed    *Safe Medications During Pregnancy    *CPT Code Sheet/MFM 13week NT pamphlet    *[employer]      SBIRT Screen:  Depression Screening Follow-up Plan: Patient's depression screening was negative with an Vassar score of 2.          *[facility]   *yes discussed genetic testing- pt interested   Patient has been informed of basic prenatal advice such as avoiding alcohol, drugs, and smoking. She should remain hydrated and take daily prenatal vitamins. Patient should avoid caffeine, raw sprouts, high mercury fish, undercooked fish, raw eggs, organ meat, unwashed produce, and unpasteurized cheeses, milk, and fruit juice and undercooked meats. She has been informed about toxoplasmosis and to avoid cat feces. *Details that I feel the provider should be aware of:  Savannah Reid came in for her OB intake at 9w4d. Patient c/o occasional constipation and nausea. Vit B6 and Unisom not providing any relief. Will try Diclegis. Patient with h/o GHTN with prior pregnancy. CMP, PC ratio and uric acid added to prenatal panel. MFM appointments already scheduled. PN1 visit scheduled for *10/13. The patient was oriented to our practice and all questions were answered.     Interviewed by:  Jeannie Anton RN

## 2023-09-25 ENCOUNTER — PATIENT MESSAGE (OUTPATIENT)
Dept: OBGYN CLINIC | Facility: MEDICAL CENTER | Age: 28
End: 2023-09-25

## 2023-09-25 DIAGNOSIS — R11.0 NAUSEA: ICD-10-CM

## 2023-09-25 RX ORDER — METOCLOPRAMIDE 5 MG/1
5 TABLET ORAL 4 TIMES DAILY
Qty: 30 TABLET | Refills: 0 | Status: SHIPPED | OUTPATIENT
Start: 2023-09-25

## 2023-09-25 RX ORDER — METOCLOPRAMIDE 5 MG/1
5 TABLET ORAL 4 TIMES DAILY
Qty: 30 TABLET | Refills: 0 | Status: SHIPPED | OUTPATIENT
Start: 2023-09-25 | End: 2023-09-25 | Stop reason: SDUPTHER

## 2023-10-11 PROBLEM — F32.A DEPRESSION AFFECTING PREGNANCY IN FIRST TRIMESTER, ANTEPARTUM: Status: ACTIVE | Noted: 2023-10-11

## 2023-10-11 PROBLEM — O09.291 HISTORY OF PRE-ECLAMPSIA IN PRIOR PREGNANCY, CURRENTLY PREGNANT IN FIRST TRIMESTER: Status: ACTIVE | Noted: 2023-10-11

## 2023-10-11 PROBLEM — O99.210 OBESITY IN PREGNANCY: Status: ACTIVE | Noted: 2023-10-11

## 2023-10-11 PROBLEM — O99.341 DEPRESSION AFFECTING PREGNANCY IN FIRST TRIMESTER, ANTEPARTUM: Status: ACTIVE | Noted: 2023-10-11

## 2023-10-11 NOTE — PROGRESS NOTES
Prenatal H&P     Denies loss of fluid, vaginal discharge, vaginal bleeding  and abdominal pain. Not feeling fetal movement. Tolerating PNV well. Has not completed prenatal panel or baseline preeclamptic labs. Plans for genetic screening appointment scheduled 10/16/23. Planned pregnancy. Is having a lot of nausea and vomiting all day long. Has tried Diclegis and Reglan without significant improvement. OB history:     G1- 3/3/21 term  female 6lb 90OQ; complicated by preeclampsia without severe features      G2- current     Dating:     LMP - 23 JULIETTE 24     US on 23 @  7w 3d JULIETTE 24  Working JULIETTE 24 ( dating by LMP discrepancy not >5 days @ < 8 6/ gestational weeks)    Surgical history:     Appendectomy, wisdom teeth and b/l tympanoplasty     Medical History:     Depression, anxiety, UTI, Hyperlipidemia,     Social history:     Alcohol/ tobacco/ illicit drug rare alcohol use prior to pregnancy/no tobacco use/medical marijuana use last used 2023/denies other drug use     Denies history of STD/STI     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 She denies a family history of inheritable conditions such as physical or intellectual disabilities, birth defects, blood disorders, heart or neural tube defects. She has never had MRSA. She denies recent travel or travel planned in the near future. Patient Plans   - Feeding breast-feeding  - Contraception partner vasectomy  - Aware office delivers [facility]. If < 32 weeks will recommend evaluation at [address] [place] [address] Avenue:  - Continue prenatal vitamin daily  - Genetic screening/ [facility] appointment 10/16/23  -Nausea and vomiting-encourage small frequent meals. Reviewed to increase carbs. Try eating something small prior to getting out of bed in the morning. Rx B6 12.5 mg 3 times daily. Rx Unisom 25 mg nightly. Rx Pepcid 20 mg twice daily. Rx Zofran 4 mg every 8 hours as needed  - history of preeclampsia encouraged to complete baseline labs and start LDASA 162 mg daily   -  Weight gain in pregnancy-Who BMI recommend 11-20 lb . Healthy diet and daily exercise reviewed and encouraged  - Unit regimen reviewed visit every 4 weeks until 28 weeks, every 2 weeks until 36 weeks and then weekly until delivery. - pap smear collected. Gonorrhea/ chlamydia collected. Encouraged to complete prenatal panel and baseline preeclamptic labs  - Vaccines in Pregnancy      - TDAP vaccine after 27 gestational weeks     - Reviewed with patient  Pregnancy with COVID infection is considered  high risk and can have poor outcome including  delivery, more severe infection. therefore  pregnant patients are recommended to get  COVID-19 vaccination. Written information provided. Had vaccine x 1 J&J      - influenza October- March administered today  -  Common discomforts of pregnancy and precautions reviewed. Signs and symptoms to report reviewed. Encouraged social distancing, good hand hygiene, masking, avoiding crowds and written information provided about COVID-19.   RTO 4 weeks f/u labs, US & nausea

## 2023-10-13 ENCOUNTER — APPOINTMENT (OUTPATIENT)
Dept: LAB | Facility: CLINIC | Age: 28
End: 2023-10-13
Payer: COMMERCIAL

## 2023-10-13 ENCOUNTER — INITIAL PRENATAL (OUTPATIENT)
Dept: OBGYN CLINIC | Facility: MEDICAL CENTER | Age: 28
End: 2023-10-13
Payer: COMMERCIAL

## 2023-10-13 VITALS — BODY MASS INDEX: 30.73 KG/M2 | WEIGHT: 179 LBS | SYSTOLIC BLOOD PRESSURE: 118 MMHG | DIASTOLIC BLOOD PRESSURE: 68 MMHG

## 2023-10-13 DIAGNOSIS — F32.A DEPRESSION AFFECTING PREGNANCY IN FIRST TRIMESTER, ANTEPARTUM: ICD-10-CM

## 2023-10-13 DIAGNOSIS — O99.210 OBESITY IN PREGNANCY: ICD-10-CM

## 2023-10-13 DIAGNOSIS — O99.341 DEPRESSION AFFECTING PREGNANCY IN FIRST TRIMESTER, ANTEPARTUM: ICD-10-CM

## 2023-10-13 DIAGNOSIS — O21.9 NAUSEA AND VOMITING IN PREGNANCY PRIOR TO 22 WEEKS GESTATION: ICD-10-CM

## 2023-10-13 DIAGNOSIS — Z3A.12 12 WEEKS GESTATION OF PREGNANCY: ICD-10-CM

## 2023-10-13 DIAGNOSIS — Z34.81 PRENATAL CARE, SUBSEQUENT PREGNANCY, FIRST TRIMESTER: ICD-10-CM

## 2023-10-13 DIAGNOSIS — Z11.3 ROUTINE SCREENING FOR STI (SEXUALLY TRANSMITTED INFECTION): ICD-10-CM

## 2023-10-13 DIAGNOSIS — Z12.4 CERVICAL CANCER SCREENING: ICD-10-CM

## 2023-10-13 DIAGNOSIS — O09.291 HISTORY OF PRE-ECLAMPSIA IN PRIOR PREGNANCY, CURRENTLY PREGNANT IN FIRST TRIMESTER: Primary | ICD-10-CM

## 2023-10-13 LAB
ABO GROUP BLD: NORMAL
ALBUMIN SERPL BCP-MCNC: 4.1 G/DL (ref 3.5–5)
ALP SERPL-CCNC: 47 U/L (ref 34–104)
ALT SERPL W P-5'-P-CCNC: 15 U/L (ref 7–52)
AMORPH URATE CRY URNS QL MICRO: ABNORMAL
ANION GAP SERPL CALCULATED.3IONS-SCNC: 10 MMOL/L
AST SERPL W P-5'-P-CCNC: 13 U/L (ref 13–39)
BACTERIA UR QL AUTO: ABNORMAL /HPF
BASOPHILS # BLD AUTO: 0.01 THOUSANDS/ÂΜL (ref 0–0.1)
BASOPHILS NFR BLD AUTO: 0 % (ref 0–1)
BILIRUB SERPL-MCNC: 0.3 MG/DL (ref 0.2–1)
BILIRUB UR QL STRIP: NEGATIVE
BLD GP AB SCN SERPL QL: NEGATIVE
BUN SERPL-MCNC: 8 MG/DL (ref 5–25)
CALCIUM SERPL-MCNC: 9.6 MG/DL (ref 8.4–10.2)
CHLORIDE SERPL-SCNC: 105 MMOL/L (ref 96–108)
CLARITY UR: ABNORMAL
CO2 SERPL-SCNC: 22 MMOL/L (ref 21–32)
COLOR UR: YELLOW
CREAT SERPL-MCNC: 0.5 MG/DL (ref 0.6–1.3)
CREAT UR-MCNC: 139.8 MG/DL
EOSINOPHIL # BLD AUTO: 0.02 THOUSAND/ÂΜL (ref 0–0.61)
EOSINOPHIL NFR BLD AUTO: 0 % (ref 0–6)
ERYTHROCYTE [DISTWIDTH] IN BLOOD BY AUTOMATED COUNT: 12.8 % (ref 11.6–15.1)
GFR SERPL CREATININE-BSD FRML MDRD: 130 ML/MIN/1.73SQ M
GLUCOSE SERPL-MCNC: 124 MG/DL (ref 65–140)
GLUCOSE UR STRIP-MCNC: NEGATIVE MG/DL
HBV SURFACE AB SER-ACNC: <3 MIU/ML
HBV SURFACE AG SER QL: NORMAL
HCT VFR BLD AUTO: 38.6 % (ref 34.8–46.1)
HCV AB SER QL: NORMAL
HGB BLD-MCNC: 13.7 G/DL (ref 11.5–15.4)
HGB UR QL STRIP.AUTO: NEGATIVE
HIV 1+2 AB+HIV1 P24 AG SERPL QL IA: NORMAL
HIV 2 AB SERPL QL IA: NORMAL
HIV1 AB SERPL QL IA: NORMAL
HIV1 P24 AG SERPL QL IA: NORMAL
IMM GRANULOCYTES # BLD AUTO: 0.03 THOUSAND/UL (ref 0–0.2)
IMM GRANULOCYTES NFR BLD AUTO: 1 % (ref 0–2)
KETONES UR STRIP-MCNC: NEGATIVE MG/DL
LEUKOCYTE ESTERASE UR QL STRIP: NEGATIVE
LYMPHOCYTES # BLD AUTO: 0.81 THOUSANDS/ÂΜL (ref 0.6–4.47)
LYMPHOCYTES NFR BLD AUTO: 13 % (ref 14–44)
MCH RBC QN AUTO: 31.8 PG (ref 26.8–34.3)
MCHC RBC AUTO-ENTMCNC: 35.5 G/DL (ref 31.4–37.4)
MCV RBC AUTO: 90 FL (ref 82–98)
MONOCYTES # BLD AUTO: 0.39 THOUSAND/ÂΜL (ref 0.17–1.22)
MONOCYTES NFR BLD AUTO: 6 % (ref 4–12)
MUCOUS THREADS UR QL AUTO: ABNORMAL
NEUTROPHILS # BLD AUTO: 4.85 THOUSANDS/ÂΜL (ref 1.85–7.62)
NEUTS SEG NFR BLD AUTO: 80 % (ref 43–75)
NITRITE UR QL STRIP: NEGATIVE
NON-SQ EPI CELLS URNS QL MICRO: ABNORMAL /HPF
NRBC BLD AUTO-RTO: 0 /100 WBCS
PH UR STRIP.AUTO: 6.5 [PH]
PLATELET # BLD AUTO: 180 THOUSANDS/UL (ref 149–390)
PMV BLD AUTO: 10.8 FL (ref 8.9–12.7)
POTASSIUM SERPL-SCNC: 3.5 MMOL/L (ref 3.5–5.3)
PROT SERPL-MCNC: 7.1 G/DL (ref 6.4–8.4)
PROT UR STRIP-MCNC: ABNORMAL MG/DL
PROT UR-MCNC: 13 MG/DL
PROT/CREAT UR: 0.09 MG/G{CREAT} (ref 0–0.1)
RBC # BLD AUTO: 4.31 MILLION/UL (ref 3.81–5.12)
RBC #/AREA URNS AUTO: ABNORMAL /HPF
RH BLD: POSITIVE
RUBV IGG SERPL IA-ACNC: 64.2 IU/ML
SODIUM SERPL-SCNC: 137 MMOL/L (ref 135–147)
SP GR UR STRIP.AUTO: 1.02 (ref 1–1.03)
SPECIMEN EXPIRATION DATE: NORMAL
TREPONEMA PALLIDUM IGG+IGM AB [PRESENCE] IN SERUM OR PLASMA BY IMMUNOASSAY: NORMAL
URATE SERPL-MCNC: 2.4 MG/DL (ref 2–7.5)
UROBILINOGEN UR STRIP-ACNC: <2 MG/DL
WBC # BLD AUTO: 6.11 THOUSAND/UL (ref 4.31–10.16)
WBC #/AREA URNS AUTO: ABNORMAL /HPF

## 2023-10-13 PROCEDURE — 86900 BLOOD TYPING SEROLOGIC ABO: CPT

## 2023-10-13 PROCEDURE — 86803 HEPATITIS C AB TEST: CPT

## 2023-10-13 PROCEDURE — G0145 SCR C/V CYTO,THINLAYER,RESCR: HCPCS | Performed by: NURSE PRACTITIONER

## 2023-10-13 PROCEDURE — G0476 HPV COMBO ASSAY CA SCREEN: HCPCS | Performed by: NURSE PRACTITIONER

## 2023-10-13 PROCEDURE — 86762 RUBELLA ANTIBODY: CPT

## 2023-10-13 PROCEDURE — 90471 IMMUNIZATION ADMIN: CPT | Performed by: OBSTETRICS & GYNECOLOGY

## 2023-10-13 PROCEDURE — 87389 HIV-1 AG W/HIV-1&-2 AB AG IA: CPT

## 2023-10-13 PROCEDURE — 87086 URINE CULTURE/COLONY COUNT: CPT

## 2023-10-13 PROCEDURE — 87340 HEPATITIS B SURFACE AG IA: CPT

## 2023-10-13 PROCEDURE — PNV: Performed by: OBSTETRICS & GYNECOLOGY

## 2023-10-13 PROCEDURE — 90686 IIV4 VACC NO PRSV 0.5 ML IM: CPT | Performed by: OBSTETRICS & GYNECOLOGY

## 2023-10-13 PROCEDURE — 82570 ASSAY OF URINE CREATININE: CPT

## 2023-10-13 PROCEDURE — 80053 COMPREHEN METABOLIC PANEL: CPT

## 2023-10-13 PROCEDURE — 85025 COMPLETE CBC W/AUTO DIFF WBC: CPT

## 2023-10-13 PROCEDURE — 86706 HEP B SURFACE ANTIBODY: CPT

## 2023-10-13 PROCEDURE — 36415 COLL VENOUS BLD VENIPUNCTURE: CPT

## 2023-10-13 PROCEDURE — 84550 ASSAY OF BLOOD/URIC ACID: CPT

## 2023-10-13 PROCEDURE — 87591 N.GONORRHOEAE DNA AMP PROB: CPT | Performed by: NURSE PRACTITIONER

## 2023-10-13 PROCEDURE — 86901 BLOOD TYPING SEROLOGIC RH(D): CPT

## 2023-10-13 PROCEDURE — 86850 RBC ANTIBODY SCREEN: CPT

## 2023-10-13 PROCEDURE — 87491 CHLMYD TRACH DNA AMP PROBE: CPT | Performed by: NURSE PRACTITIONER

## 2023-10-13 PROCEDURE — 84156 ASSAY OF PROTEIN URINE: CPT

## 2023-10-13 PROCEDURE — 86780 TREPONEMA PALLIDUM: CPT

## 2023-10-13 RX ORDER — ONDANSETRON 4 MG/1
4 TABLET, FILM COATED ORAL EVERY 8 HOURS PRN
Qty: 20 TABLET | Refills: 0 | Status: SHIPPED | OUTPATIENT
Start: 2023-10-13

## 2023-10-13 RX ORDER — ASPIRIN 81 MG/1
162 TABLET, CHEWABLE ORAL DAILY
Qty: 332 TABLET | Refills: 0 | Status: SHIPPED | OUTPATIENT
Start: 2023-10-13 | End: 2024-03-27

## 2023-10-13 RX ORDER — DIPHENHYDRAMINE HYDROCHLORIDE 25 MG/1
12.5 CAPSULE ORAL 3 TIMES DAILY
Qty: 90 TABLET | Refills: 1 | Status: SHIPPED | OUTPATIENT
Start: 2023-10-13

## 2023-10-13 RX ORDER — PANTOPRAZOLE SODIUM 20 MG/1
20 TABLET, DELAYED RELEASE ORAL DAILY
Qty: 90 TABLET | Refills: 0 | Status: SHIPPED | OUTPATIENT
Start: 2023-10-13

## 2023-10-15 PROBLEM — Z3A.13 13 WEEKS GESTATION OF PREGNANCY: Status: ACTIVE | Noted: 2023-10-13

## 2023-10-15 LAB — BACTERIA UR CULT: NORMAL

## 2023-10-16 ENCOUNTER — ROUTINE PRENATAL (OUTPATIENT)
Dept: PERINATAL CARE | Facility: OTHER | Age: 28
End: 2023-10-16
Payer: COMMERCIAL

## 2023-10-16 VITALS
HEIGHT: 64 IN | SYSTOLIC BLOOD PRESSURE: 122 MMHG | BODY MASS INDEX: 30.56 KG/M2 | TEMPERATURE: 82 F | WEIGHT: 179 LBS | DIASTOLIC BLOOD PRESSURE: 60 MMHG

## 2023-10-16 DIAGNOSIS — O21.9 NAUSEA AND VOMITING IN PREGNANCY PRIOR TO 22 WEEKS GESTATION: ICD-10-CM

## 2023-10-16 DIAGNOSIS — Z36.82 NUCHAL TRANSLUCENCY OF FETUS ON PRENATAL ULTRASOUND: ICD-10-CM

## 2023-10-16 DIAGNOSIS — Z3A.13 13 WEEKS GESTATION OF PREGNANCY: ICD-10-CM

## 2023-10-16 DIAGNOSIS — O09.291 HISTORY OF PRE-ECLAMPSIA IN PRIOR PREGNANCY, CURRENTLY PREGNANT IN FIRST TRIMESTER: Primary | ICD-10-CM

## 2023-10-16 DIAGNOSIS — Z34.81 PRENATAL CARE, SUBSEQUENT PREGNANCY, FIRST TRIMESTER: ICD-10-CM

## 2023-10-16 DIAGNOSIS — O43.199 MARGINAL INSERTION OF UMBILICAL CORD AFFECTING MANAGEMENT OF MOTHER: ICD-10-CM

## 2023-10-16 DIAGNOSIS — O99.341 DEPRESSION AFFECTING PREGNANCY IN FIRST TRIMESTER, ANTEPARTUM: ICD-10-CM

## 2023-10-16 DIAGNOSIS — F32.A DEPRESSION AFFECTING PREGNANCY IN FIRST TRIMESTER, ANTEPARTUM: ICD-10-CM

## 2023-10-16 LAB
C TRACH DNA SPEC QL NAA+PROBE: NEGATIVE
HPV HR 12 DNA CVX QL NAA+PROBE: NEGATIVE
HPV16 DNA CVX QL NAA+PROBE: NEGATIVE
HPV18 DNA CVX QL NAA+PROBE: NEGATIVE
N GONORRHOEA DNA SPEC QL NAA+PROBE: NEGATIVE

## 2023-10-16 PROCEDURE — 76813 OB US NUCHAL MEAS 1 GEST: CPT | Performed by: OBSTETRICS & GYNECOLOGY

## 2023-10-16 PROCEDURE — 99243 OFF/OP CNSLTJ NEW/EST LOW 30: CPT | Performed by: OBSTETRICS & GYNECOLOGY

## 2023-10-16 PROCEDURE — 76801 OB US < 14 WKS SINGLE FETUS: CPT | Performed by: OBSTETRICS & GYNECOLOGY

## 2023-10-16 NOTE — PROGRESS NOTES
Patient chose to have [facility] Non-invasive Prenatal Screen with fetal sex. Patient choose billed through insurance. Patient assistance program (PAP) application provided to patient yes. PAP sent with specimen No.     Patient given brochure and is aware [facility] will contact their insurance and coordinate coverage. Patient made aware she will receive a text message and e-mail from [employer]. Patient informed text/phone call message will come from area code  "415". Provided [employer] # [phone] and web site at [email or web address]   "[place] your test online" card with barcode and test tube ID provided to patient. Reviewed [employer]'s web site states 5-7 business days for results via their portal.   [employer] message will be sent to patient when M receives results /provider reviews. 2 vials of blood drawn from  right arm by Bri CONTE Patient tolerated blood draw without difficulty. Specimens labeled with patient identifiers (name, date of birth, specimen collection date), order and specimen were verified with patient, packed and sent via [address]. [employer]  tracking #  [id number]  Copy of lab order scanned to Epic media. [facility] will have results in approximately 7-10 business days and will call patient or notify via [address] [place] [address]. Patient aware viewing lab result online will reveal fetal sex if ordered. Patient verbalized understanding of all instructions and no questions at this time.

## 2023-10-16 NOTE — PROGRESS NOTES
OFFICE CONSULT  Referring physician:   Florence Andino. Jay Pfeiffer,  [address]      Dear Dr. Jay Pfeiffer      Thank you for requesting a  consultation on your patient Ms. Jewel Gross for the following indications:  Genetic screening    History  Medications: Aspirin 162 mg daily, Zoloft 100 mg daily, Unisom, Reglan, Zofran, Protonix, prenatal vitamins, vitamin B6 and Flonase  Allergies to medications: None  Past medical history: Preeclampsia, anxiety, depression  Past surgical history: Appendectomy, wisdom tooth extraction, tympanostomy tube placement  Past obstetrical history:  2 para 1  3/3/2021 at 37 weeks had a 6 pound 11 ounce baby girl vaginally. Her pregnancy was complicated by preeclampsia   Social history: She does not report any substance use  First generation family history: Her mother borderline hypertension    Ultrasound findings: The ultrasound shows a fetus concordant with dates. The nasal bone and nuchal translucency appears normal. No malformations are seen on today's early ultrasound. The placental cord insertion site is near the edge /margin of the placenta. The patient was informed of the findings and counseled about the limitations of the exam in detecting all forms of fetal congenital abnormalities. She does not report any vaginal bleeding or uterine cramping or contractions. Specific counseling was provided on the following problems: We discussed the options for genetic screening which include invasive testing on the fetal placenta or on fetal skin cells within the amniotic fluid and compared this to noninvasive testing which includes cell free DNA screening. We reviewed the risks, the benefits and the limitations of each. In the end patient chose to complete the cell free DNA screen. With her history of preeclampsia agree with starting on baby aspirin till 36 weeks and 0 days.   Her baseline preeclamptic labs were normal.  Recommend limited weight gain of 11 to 19 pounds in pregnancy as another option to decrease recurrence risk of preeclampsia. We reviewed her SSRI /Zoloft use in pregnancy. Urged her to review the medication on Zoloft use in pregnancy through mother to baby website. https://mothertobaby.org/fact-sheets/sertraline-zoloft-pregnancy/  Marginal cord is defined as a placental cord insertion less than 2 cm from the edge of the placental dis. This is largely considered an anatomic variant. It is relatively common with an incidence of approximately 6%. We discussed that this has a weak association with fetal growth restriction which is why a third-trimester growth ultrasound is typically recommended if this does not resolve by her second trimester scan. Future tests recommended: The results of her NIPT will return in 7-10 days. Screening for spina bifida with an MSAFP screen is a future test that can be prescribed through her OB office. This blood work should be drawn preferably at 16 to 18 weeks so that the results return prior to her next scan. The test though can be run until 21 weeks and 6 days if needed. Future ultrasounds ordered today:   Fetal Level II ultrasound imaging is recommended at 19-20 weeks' gestation. Pre visit time reviewing her records   5 minutes  Face to face time 10 minutes  Post visit time on documentation of note, updating her problem list, adding orders and prescriptions 20 minutes. Procedures that were completed today were charged separately. The level of decision making was low level complexity.     Krunal Rust MD

## 2023-10-16 NOTE — LETTER
2023     [facility]    Patient: Isabela Valero   YOB: 1995   Date of Visit: 10/16/2023       Dear Dr. Tao Foster: Thank you for referring Jones Zelaya to me for evaluation. Below are my notes for this consultation. If you have questions, please do not hesitate to call me. I look forward to following your patient along with you. Sincerely,        Nieves Cramer MD        CC: No Recipients    Nieves Cramer MD  10/16/2023 10:59 PM  Sign when Signing Visit      OFFICE CONSULT  Referring physician:   Shweta Shaffer. Nany Agustin,  [address]      Dear Dr. Nany Agustin      Thank you for requesting a  consultation on your patient Ms. Isabela Valero for the following indications:  Genetic screening    History  Medications: Aspirin 162 mg daily, Zoloft 100 mg daily, Unisom, Reglan, Zofran, Protonix, prenatal vitamins, vitamin B6 and Flonase  Allergies to medications: None  Past medical history: Preeclampsia, anxiety, depression  Past surgical history: Appendectomy, wisdom tooth extraction, tympanostomy tube placement  Past obstetrical history:  2 para 1  3/3/2021 at 37 weeks had a 6 pound 11 ounce baby girl vaginally. Her pregnancy was complicated by preeclampsia   Social history: She does not report any substance use  First generation family history: Her mother borderline hypertension    Ultrasound findings: The ultrasound shows a fetus concordant with dates. The nasal bone and nuchal translucency appears normal. No malformations are seen on today's early ultrasound. The placental cord insertion site is near the edge /margin of the placenta. The patient was informed of the findings and counseled about the limitations of the exam in detecting all forms of fetal congenital abnormalities. She does not report any vaginal bleeding or uterine cramping or contractions. Specific counseling was provided on the following problems: We discussed the options for genetic screening which include invasive testing on the fetal placenta or on fetal skin cells within the amniotic fluid and compared this to noninvasive testing which includes cell free DNA screening. We reviewed the risks, the benefits and the limitations of each. In the end patient chose to complete the cell free DNA screen. With her history of preeclampsia agree with starting on baby aspirin till 36 weeks and 0 days. Her baseline preeclamptic labs were normal.  Recommend limited weight gain of 11 to 19 pounds in pregnancy as another option to decrease recurrence risk of preeclampsia. We reviewed her SSRI /Zoloft use in pregnancy. Urged her to review the medication on Zoloft use in pregnancy through mother to baby website. https://mothertobaby.org/fact-sheets/sertraline-zoloft-pregnancy/  Marginal cord is defined as a placental cord insertion less than 2 cm from the edge of the placental dis. This is largely considered an anatomic variant. It is relatively common with an incidence of approximately 6%. We discussed that this has a weak association with fetal growth restriction which is why a third-trimester growth ultrasound is typically recommended if this does not resolve by her second trimester scan. Future tests recommended: The results of her NIPT will return in 7-10 days. Screening for spina bifida with an MSAFP screen is a future test that can be prescribed through her OB office. This blood work should be drawn preferably at 16 to 18 weeks so that the results return prior to her next scan. The test though can be run until 21 weeks and 6 days if needed. Future ultrasounds ordered today:   Fetal Level II ultrasound imaging is recommended at 19-20 weeks' gestation.     Pre visit time reviewing her records   5 minutes  Face to face time 10 minutes  Post visit time on documentation of note, updating her problem list, adding orders and prescriptions 20 minutes. Procedures that were completed today were charged separately. The level of decision making was low level complexity.     Marta Watts MD

## 2023-10-20 LAB
LAB AP GYN PRIMARY INTERPRETATION: NORMAL
LAB AP LMP: NORMAL
Lab: NORMAL

## 2023-10-23 ENCOUNTER — TELEPHONE (OUTPATIENT)
Facility: HOSPITAL | Age: 28
End: 2023-10-23

## 2023-10-23 NOTE — TELEPHONE ENCOUNTER
Spoke with pt and provided her with the results of the NIPS, gender NOT provided. PT instructed on MSAFP being ordered by OB and timing of the test.  Pt receptive to information and declines questions at this time. Message routed to [facility] for ordering MSAFP.

## 2023-10-23 NOTE — TELEPHONE ENCOUNTER
----- Message from Ezio Cruz MD sent at 10/20/2023  5:09 PM EDT -----  Your Cell free DNA screening returned as normal. If you are interested in knowing what the baby's sex is, than you will need to open the lab result to see it. Your obstetrician at your next OB visit should offer screening for spina bifida that can be completed between 12 and 18 weeks and utilizes the blood test called MSAFP. This lab is to see if your baby is at increased risk to have spinal defect.   Alex Castellanos MD

## 2023-11-01 PROBLEM — O09.292 HISTORY OF PRE-ECLAMPSIA IN PRIOR PREGNANCY, CURRENTLY PREGNANT IN SECOND TRIMESTER: Status: ACTIVE | Noted: 2023-10-11

## 2023-11-01 PROBLEM — O99.342 DEPRESSION AFFECTING PREGNANCY IN SECOND TRIMESTER, ANTEPARTUM: Status: ACTIVE | Noted: 2023-10-11

## 2023-11-06 ENCOUNTER — ROUTINE PRENATAL (OUTPATIENT)
Dept: OBGYN CLINIC | Facility: MEDICAL CENTER | Age: 28
End: 2023-11-06

## 2023-11-06 VITALS — WEIGHT: 179.9 LBS | BODY MASS INDEX: 30.88 KG/M2 | DIASTOLIC BLOOD PRESSURE: 64 MMHG | SYSTOLIC BLOOD PRESSURE: 116 MMHG

## 2023-11-06 DIAGNOSIS — O99.342 DEPRESSION AFFECTING PREGNANCY IN SECOND TRIMESTER, ANTEPARTUM: ICD-10-CM

## 2023-11-06 DIAGNOSIS — O09.292 HISTORY OF PRE-ECLAMPSIA IN PRIOR PREGNANCY, CURRENTLY PREGNANT IN SECOND TRIMESTER: ICD-10-CM

## 2023-11-06 DIAGNOSIS — K59.00 CONSTIPATION DURING PREGNANCY IN SECOND TRIMESTER: ICD-10-CM

## 2023-11-06 DIAGNOSIS — Z3A.16 16 WEEKS GESTATION OF PREGNANCY: ICD-10-CM

## 2023-11-06 DIAGNOSIS — F32.A DEPRESSION AFFECTING PREGNANCY IN SECOND TRIMESTER, ANTEPARTUM: ICD-10-CM

## 2023-11-06 DIAGNOSIS — O43.199 MARGINAL INSERTION OF UMBILICAL CORD AFFECTING MANAGEMENT OF MOTHER: Primary | ICD-10-CM

## 2023-11-06 DIAGNOSIS — O99.612 CONSTIPATION DURING PREGNANCY IN SECOND TRIMESTER: ICD-10-CM

## 2023-11-06 PROCEDURE — PNV: Performed by: NURSE PRACTITIONER

## 2023-11-06 NOTE — PROGRESS NOTES
Denies loss of fluid, vaginal bleeding or abdominal pain. Confirms fetal movement, flutters. Tolerating prenatal vitamin, low-dose aspirin and Zoloft well. Is using Reglan, Zofran and Protonix as needed. Is complaining of constipation last bowel movement about 5 days ago. Has been taking MiraLAX twice a day for the past 2 days. Has been using Colace daily for the past 3 days. Has also incorporated prunes and apple juice into diet over the past 3 to 4 days. Tried glycerin suppository with minimal effect. BP: 116/64 weight: +4lb  Plan  -Continue prenatal vitamins and low-dose aspirin daily  -Follow-up with  center 23  -Depression in pregnancy continue Zoloft. Encouraged to call office with worsening symptoms, thoughts of self-harm or harm to others  -Constipation reviewed with patient prenatal vitamins have iron in them. Reviewed with patient GI motility slows during pregnancy. Reviewed option of trying MiraLAX for another day if no improvement fleets enema can also use fleets enema tonight. Encouraged to use Colace more consistently. Should increase hydration and fiber in diet. Can also try alternating prenatal vitamins with gummy vitamins that typically do not have iron if this becomes a consistent problem can offer low iron prenatal vitamins  -Declined MSAFP  -Common discomforts of pregnancy and precautions reviewed. Pains and symptoms to report reviewed.   RTO 4 weeks

## 2023-11-08 DIAGNOSIS — O21.9 NAUSEA AND VOMITING IN PREGNANCY PRIOR TO 22 WEEKS GESTATION: ICD-10-CM

## 2023-11-08 RX ORDER — ONDANSETRON 4 MG/1
4 TABLET, FILM COATED ORAL EVERY 8 HOURS PRN
Qty: 20 TABLET | Refills: 0 | Status: SHIPPED | OUTPATIENT
Start: 2023-11-08

## 2023-12-03 NOTE — PROGRESS NOTES
Please refer to the [facility] ultrasound report in Ob Procedures for additional information regarding today's visit

## 2023-12-04 ENCOUNTER — ROUTINE PRENATAL (OUTPATIENT)
Dept: PERINATAL CARE | Facility: OTHER | Age: 28
End: 2023-12-04
Payer: COMMERCIAL

## 2023-12-04 VITALS
SYSTOLIC BLOOD PRESSURE: 120 MMHG | DIASTOLIC BLOOD PRESSURE: 74 MMHG | HEIGHT: 64 IN | WEIGHT: 185 LBS | HEART RATE: 96 BPM | BODY MASS INDEX: 31.58 KG/M2

## 2023-12-04 DIAGNOSIS — O99.342 DEPRESSION AFFECTING PREGNANCY IN SECOND TRIMESTER, ANTEPARTUM: ICD-10-CM

## 2023-12-04 DIAGNOSIS — F41.9 ANXIETY DURING PREGNANCY, ANTEPARTUM, SECOND TRIMESTER: ICD-10-CM

## 2023-12-04 DIAGNOSIS — O09.292 HISTORY OF PRE-ECLAMPSIA IN PRIOR PREGNANCY, CURRENTLY PREGNANT IN SECOND TRIMESTER: ICD-10-CM

## 2023-12-04 DIAGNOSIS — O99.212 MATERNAL OBESITY, ANTEPARTUM, SECOND TRIMESTER: Primary | ICD-10-CM

## 2023-12-04 DIAGNOSIS — Z3A.20 20 WEEKS GESTATION OF PREGNANCY: ICD-10-CM

## 2023-12-04 DIAGNOSIS — Z36.86 ENCOUNTER FOR ANTENATAL SCREENING FOR CERVICAL LENGTH: ICD-10-CM

## 2023-12-04 DIAGNOSIS — F32.A DEPRESSION AFFECTING PREGNANCY IN SECOND TRIMESTER, ANTEPARTUM: ICD-10-CM

## 2023-12-04 DIAGNOSIS — O99.342 ANXIETY DURING PREGNANCY, ANTEPARTUM, SECOND TRIMESTER: ICD-10-CM

## 2023-12-04 PROCEDURE — 99213 OFFICE O/P EST LOW 20 MIN: CPT | Performed by: OBSTETRICS & GYNECOLOGY

## 2023-12-04 PROCEDURE — 76817 TRANSVAGINAL US OBSTETRIC: CPT | Performed by: OBSTETRICS & GYNECOLOGY

## 2023-12-04 PROCEDURE — 76811 OB US DETAILED SNGL FETUS: CPT | Performed by: OBSTETRICS & GYNECOLOGY

## 2023-12-04 NOTE — PROGRESS NOTES
Ultrasound Probe Disinfection    A transvaginal ultrasound was performed. Prior to use, disinfection was performed with High Level Disinfection Process ([employer]). Probe serial number [id number]: [id number] was used.       Pavel Mcfarlane., SUBHASH  12/04/23  5:24 PM

## 2023-12-05 PROBLEM — Z3A.20 20 WEEKS GESTATION OF PREGNANCY: Status: ACTIVE | Noted: 2023-10-13

## 2023-12-07 ENCOUNTER — ROUTINE PRENATAL (OUTPATIENT)
Dept: OBGYN CLINIC | Facility: MEDICAL CENTER | Age: 28
End: 2023-12-07

## 2023-12-07 VITALS — BODY MASS INDEX: 31.22 KG/M2 | DIASTOLIC BLOOD PRESSURE: 68 MMHG | SYSTOLIC BLOOD PRESSURE: 116 MMHG | WEIGHT: 181.9 LBS

## 2023-12-07 DIAGNOSIS — O21.9 NAUSEA AND VOMITING IN PREGNANCY PRIOR TO 22 WEEKS GESTATION: ICD-10-CM

## 2023-12-07 DIAGNOSIS — O43.199 MARGINAL INSERTION OF UMBILICAL CORD AFFECTING MANAGEMENT OF MOTHER: ICD-10-CM

## 2023-12-07 DIAGNOSIS — O99.342 DEPRESSION AFFECTING PREGNANCY IN SECOND TRIMESTER, ANTEPARTUM: Primary | ICD-10-CM

## 2023-12-07 DIAGNOSIS — Z3A.20 20 WEEKS GESTATION OF PREGNANCY: ICD-10-CM

## 2023-12-07 DIAGNOSIS — O09.292 HISTORY OF PRE-ECLAMPSIA IN PRIOR PREGNANCY, CURRENTLY PREGNANT IN SECOND TRIMESTER: ICD-10-CM

## 2023-12-07 DIAGNOSIS — F32.A DEPRESSION AFFECTING PREGNANCY IN SECOND TRIMESTER, ANTEPARTUM: Primary | ICD-10-CM

## 2023-12-07 DIAGNOSIS — O99.210 OBESITY IN PREGNANCY: ICD-10-CM

## 2023-12-07 PROCEDURE — PNV: Performed by: NURSE PRACTITIONER

## 2023-12-07 NOTE — PROGRESS NOTES
Denies loss of fluid, vaginal bleeding and abdominal pain. Confirms fetal movement. Tolerating prenatal vitamin, low-dose aspirin and Zoloft well. Denies worsening symptoms, thoughts of self-harm or harm to others. Has Pepcid, Reglan and omeprazole for nausea. Is doing significantly better with nausea.  center ultrasound reviewed-23-variable presentation, normal-appearing fetal growth, anterior placenta, no placenta previa, HELGA-WNL and EFW 12 ounce  BP: 116/68 weight: +6lb  Plan  -Continue prenatal vitamin and low-dose daily  -Follow-up with  center scheduled for 3/4/24  -Depression in pregnancy continue Zoloft as ordered. Encouraged to call office with worsening symptoms, thoughts of self-harm or harm to others  -Nausea has Reglan, Pepcid and omeprazole as needed  -Common discomforts of pregnancy and precautions  reviewed. Signs and symptoms to report reviewed.   RTO 4 weeks

## 2023-12-29 ENCOUNTER — OFFICE VISIT (OUTPATIENT)
Dept: URGENT CARE | Facility: MEDICAL CENTER | Age: 28
End: 2023-12-29
Payer: COMMERCIAL

## 2023-12-29 VITALS
WEIGHT: 187 LBS | RESPIRATION RATE: 18 BRPM | SYSTOLIC BLOOD PRESSURE: 116 MMHG | BODY MASS INDEX: 32.1 KG/M2 | DIASTOLIC BLOOD PRESSURE: 54 MMHG | TEMPERATURE: 97.8 F | HEART RATE: 83 BPM | OXYGEN SATURATION: 99 %

## 2023-12-29 DIAGNOSIS — Z20.822 ENCOUNTER FOR LABORATORY TESTING FOR COVID-19 VIRUS: ICD-10-CM

## 2023-12-29 DIAGNOSIS — J02.9 SORE THROAT: Primary | ICD-10-CM

## 2023-12-29 LAB — S PYO AG THROAT QL: NEGATIVE

## 2023-12-29 PROCEDURE — 99213 OFFICE O/P EST LOW 20 MIN: CPT | Performed by: PHYSICIAN ASSISTANT

## 2023-12-29 PROCEDURE — 87070 CULTURE OTHR SPECIMN AEROBIC: CPT | Performed by: PHYSICIAN ASSISTANT

## 2023-12-29 PROCEDURE — 87880 STREP A ASSAY W/OPTIC: CPT | Performed by: PHYSICIAN ASSISTANT

## 2023-12-29 PROCEDURE — 87636 SARSCOV2 & INF A&B AMP PRB: CPT | Performed by: PHYSICIAN ASSISTANT

## 2023-12-29 NOTE — PROGRESS NOTES
[facility]        NAME: Carola Paula is a 28 y.o. female  : 1995    MRN: [id number]  DATE: 2023  TIME: 5:01 PM    Assessment and Plan   Sore throat [J02.9]  1. Sore throat  POCT rapid strepA    Throat culture      2. Encounter for laboratory testing for COVID-19 virus  Covid/Flu- Office Collect Normal            Patient Instructions     Always bring over the counter medications at pharmacy desk and inform pharmacist that you are pregnant before purchasing.   Fluids and rest  Nasal saline spray  Tylenol for pain/fever  Salt water gargles   Warm compresses over sinuses  Steam treatment (utilize proper safety precautions when in contact with hot water/steam)  Follow up with PCP in 3-5 days.  Proceed to  ER if symptoms worsen.    Chief Complaint     Chief Complaint   Patient presents with    Nasal Congestion    Sore Throat     All started yesterday. Was exposed to Strep and RSV on 23         History of Present Illness       URI   This is a new problem. The current episode started yesterday. Associated symptoms include congestion and a sore throat. Pertinent negatives include no coughing, diarrhea, ear pain, headaches, nausea, rash, rhinorrhea, sinus pain or vomiting. She has tried nothing for the symptoms.       Review of Systems   Review of Systems   Constitutional:  Negative for chills and fever.   HENT:  Positive for congestion and sore throat. Negative for ear discharge, ear pain, hearing loss, postnasal drip, rhinorrhea, sinus pressure, sinus pain, tinnitus and trouble swallowing.    Respiratory:  Negative for cough.    Gastrointestinal:  Negative for diarrhea, nausea and vomiting.   Musculoskeletal:  Negative for myalgias.   Skin:  Negative for rash.   Neurological:  Negative for headaches.         Current Medications       Current Outpatient Medications:     aspirin 81 mg chewable tablet, Chew 2 tablets (162 mg total) daily, Disp: 332 tablet, Rfl: 0    ondansetron (ZOFRAN)  4 mg tablet, Take 1 tablet (4 mg total) by mouth every 8 (eight) hours as needed for nausea or vomiting, Disp: 20 tablet, Rfl: 0    Prenatal Vit-Fe Fumarate-FA (PRENATAL VITAMIN PO), Take by mouth, Disp: , Rfl:     Pyridoxine HCl (vitamin B-6) 25 MG tablet, Take 0.5 tablets (12.5 mg total) by mouth 3 (three) times a day, Disp: 90 tablet, Rfl: 1    sertraline (ZOLOFT) 100 mg tablet, Take 1 tablet (100 mg total) by mouth daily, Disp: 90 tablet, Rfl: 0    doxylamine (UNISOM) 25 MG tablet, Take 1 tablet (25 mg total) by mouth daily at bedtime as needed for sleep or nausea (Patient not taking: Reported on 12/29/2023), Disp: 30 tablet, Rfl: 0    fluticasone (FLONASE) 50 mcg/act nasal spray, 2 sprays into each nostril daily (Patient not taking: Reported on 9/22/2023), Disp: 15.8 mL, Rfl: 11    metoclopramide (REGLAN) 5 mg tablet, Take 1 tablet (5 mg total) by mouth 4 (four) times a day (Patient not taking: Reported on 12/29/2023), Disp: 30 tablet, Rfl: 0    Current Allergies     Allergies as of 12/29/2023 - Reviewed 12/29/2023   Allergen Reaction Noted    Milk (cow) GI Intolerance 11/04/2015    Pollen extract Other (See Comments) 06/01/2016            The following portions of the patient's history were reviewed and updated as appropriate: allergies, current medications, past family history, past medical history, past social history, past surgical history and problem list.     Past Medical History:   Diagnosis Date    Anxiety     Depression     Hypertension during pregnancy in third trimester 2/25/2021    Urinary tract infection     Varicella        Past Surgical History:   Procedure Laterality Date    APPENDECTOMY      TYMPANOSTOMY TUBE PLACEMENT Bilateral 11/2022    WISDOM TOOTH EXTRACTION         Family History   Problem Relation Age of Onset    Hypertension Mother     Skin cancer Mother     Colon cancer Father         duoadenm    No Known Problems Sister     No Known Problems Daughter     No Known Problems Maternal  Grandmother     Prostate cancer Maternal Grandfather     No Known Problems Paternal Grandmother     Prostate cancer Paternal Grandfather     Atrial fibrillation Paternal Grandfather     Breast cancer Neg Hx     Ovarian cancer Neg Hx          Medications have been verified.        Objective   /54   Pulse 83   Temp 97.8 °F (36.6 °C)   Resp 18   Wt 84.8 kg (187 lb)   LMP 07/17/2023   SpO2 99%   BMI 32.10 kg/m²   Patient's last menstrual period was 07/17/2023.       Physical Exam     Physical Exam  Vitals reviewed.   Constitutional:       General: She is not in acute distress.     Appearance: She is well-developed. She is not diaphoretic.   HENT:      Head: Normocephalic and atraumatic.      Right Ear: Tympanic membrane, ear canal and external ear normal.      Left Ear: Tympanic membrane, ear canal and external ear normal.      Nose: Nose normal.      Mouth/Throat:      Pharynx: Uvula midline. Posterior oropharyngeal erythema present. No oropharyngeal exudate.      Tonsils: No tonsillar exudate.   Eyes:      General:         Right eye: No discharge.         Left eye: No discharge.   Cardiovascular:      Rate and Rhythm: Normal rate and regular rhythm.      Heart sounds: Normal heart sounds. No murmur heard.     No friction rub. No gallop.   Pulmonary:      Effort: Pulmonary effort is normal. No respiratory distress.      Breath sounds: Normal breath sounds. No wheezing, rhonchi or rales.   Lymphadenopathy:      Cervical: No cervical adenopathy.   Skin:     General: Skin is warm.      Findings: No rash.   Neurological:      Mental Status: She is alert.   Psychiatric:         Behavior: Behavior normal.         Thought Content: Thought content normal.         Judgment: Judgment normal.

## 2023-12-29 NOTE — PATIENT INSTRUCTIONS
Always bring over the counter medications at pharmacy desk and inform pharmacist that you are pregnant before purchasing.   Fluids and rest  Nasal saline spray  Tylenol for pain/fever  Salt water gargles   Warm compresses over sinuses  Steam treatment (utilize proper safety precautions when in contact with hot water/steam)  Follow up with PCP in 3-5 days.  Proceed to  ER if symptoms worsen.

## 2023-12-30 LAB
FLUAV RNA RESP QL NAA+PROBE: NEGATIVE
FLUBV RNA RESP QL NAA+PROBE: NEGATIVE
SARS-COV-2 RNA RESP QL NAA+PROBE: NEGATIVE

## 2023-12-31 LAB — BACTERIA THROAT CULT: NORMAL

## 2024-01-02 ENCOUNTER — ROUTINE PRENATAL (OUTPATIENT)
Dept: OBGYN CLINIC | Facility: MEDICAL CENTER | Age: 29
End: 2024-01-02

## 2024-01-02 VITALS — WEIGHT: 186.5 LBS | SYSTOLIC BLOOD PRESSURE: 110 MMHG | DIASTOLIC BLOOD PRESSURE: 62 MMHG | BODY MASS INDEX: 32.01 KG/M2

## 2024-01-02 DIAGNOSIS — O09.292 HISTORY OF PRE-ECLAMPSIA IN PRIOR PREGNANCY, CURRENTLY PREGNANT IN SECOND TRIMESTER: ICD-10-CM

## 2024-01-02 DIAGNOSIS — Z3A.24 24 WEEKS GESTATION OF PREGNANCY: ICD-10-CM

## 2024-01-02 DIAGNOSIS — Z34.93 THIRD TRIMESTER PREGNANCY: ICD-10-CM

## 2024-01-02 DIAGNOSIS — Z34.82 ENCOUNTER FOR SUPERVISION OF OTHER NORMAL PREGNANCY IN SECOND TRIMESTER: Primary | ICD-10-CM

## 2024-01-02 PROCEDURE — PNV: Performed by: OBSTETRICS & GYNECOLOGY

## 2024-01-02 NOTE — PROGRESS NOTES
Routine Prenatal Visit  [facility] [address], [place], [place]    Assessment/Plan:  Carola is a 28 y.o. year old  at 24w1d who presents for routine prenatal visit.     1. Encounter for supervision of other normal pregnancy in second trimester    2. Third trimester pregnancy  -     RPR-Syphilis Screening (Total Syphilis IGG/IGM); Future  -     Anemia Panel w/Reflex, OB; Future  -     Glucose, 1H PG; Future  -     CBC and differential; Future  -     Hepatitis C antibody; Future    3. 24 weeks gestation of pregnancy    4. History of pre-eclampsia in prior pregnancy, currently pregnant in second trimester          Subjective:     CC: Prenatal care    Carola Paula is a 28 y.o.  female who presents for routine prenatal care at 24w1d.  Pregnancy ROS: no leakage of fluid, pelvic pain, or vaginal bleeding.  good fetal movement.  Patient reports bilateral knee pain, states she is on her feet a lot during the day. Discussed using support hose or knee braces for more support.     The following portions of the patient's history were reviewed and updated as appropriate: allergies, current medications, past family history, past medical history, obstetric history, gynecologic history, past social history, past surgical history and problem list.    Discussed 28 week labs     Objective:  /62   Wt 84.6 kg (186 lb 8 oz)   LMP 2023   BMI 32.01 kg/m²   Pregravid Weight/BMI: 79.4 kg (175 lb) (BMI 30.02)  Current Weight: 84.6 kg (186 lb 8 oz)   Total Weight Gain: 5.216 kg (11 lb 8 oz)   Pre-Dana Vitals      Flowsheet Row Most Recent Value   Prenatal Assessment    Fetal Heart Rate 142   Movement Present   Prenatal Vitals    Blood Pressure 110/62   Weight - Scale 84.6 kg (186 lb 8 oz)   Urine Albumin/Glucose    Dilation/Effacement/Station    Vaginal Drainage    Draining Fluid No   Edema    LLE Edema Trace   RLE Edema Trace   Facial Edema None             General: Well  appearing, no distress  Respiratory: Unlabored breathing  Cardiovascular: Regular rate.  Abdomen: Soft, gravid, nontender  Fundal Height: Appropriate for gestational age.  Extremities: Warm and well perfused.  Non tender.

## 2024-01-26 ENCOUNTER — APPOINTMENT (OUTPATIENT)
Dept: LAB | Facility: CLINIC | Age: 29
End: 2024-01-26
Payer: COMMERCIAL

## 2024-01-26 DIAGNOSIS — Z34.93 THIRD TRIMESTER PREGNANCY: ICD-10-CM

## 2024-01-26 LAB
BASOPHILS # BLD AUTO: 0.01 THOUSANDS/ÂΜL (ref 0–0.1)
BASOPHILS NFR BLD AUTO: 0 % (ref 0–1)
EOSINOPHIL # BLD AUTO: 0.05 THOUSAND/ÂΜL (ref 0–0.61)
EOSINOPHIL NFR BLD AUTO: 1 % (ref 0–6)
ERYTHROCYTE [DISTWIDTH] IN BLOOD BY AUTOMATED COUNT: 13.6 % (ref 11.6–15.1)
GLUCOSE 1H P 50 G GLC PO SERPL-MCNC: 143 MG/DL (ref 40–134)
HCT VFR BLD AUTO: 37.5 % (ref 34.8–46.1)
HGB BLD-MCNC: 12.6 G/DL (ref 11.5–15.4)
IMM GRANULOCYTES # BLD AUTO: 0.06 THOUSAND/UL (ref 0–0.2)
IMM GRANULOCYTES NFR BLD AUTO: 1 % (ref 0–2)
LYMPHOCYTES # BLD AUTO: 1 THOUSANDS/ÂΜL (ref 0.6–4.47)
LYMPHOCYTES NFR BLD AUTO: 12 % (ref 14–44)
MCH RBC QN AUTO: 31.1 PG (ref 26.8–34.3)
MCHC RBC AUTO-ENTMCNC: 33.6 G/DL (ref 31.4–37.4)
MCV RBC AUTO: 93 FL (ref 82–98)
MONOCYTES # BLD AUTO: 0.61 THOUSAND/ÂΜL (ref 0.17–1.22)
MONOCYTES NFR BLD AUTO: 7 % (ref 4–12)
NEUTROPHILS # BLD AUTO: 6.61 THOUSANDS/ÂΜL (ref 1.85–7.62)
NEUTS SEG NFR BLD AUTO: 79 % (ref 43–75)
NRBC BLD AUTO-RTO: 0 /100 WBCS
PLATELET # BLD AUTO: 151 THOUSANDS/UL (ref 149–390)
PMV BLD AUTO: 11.2 FL (ref 8.9–12.7)
RBC # BLD AUTO: 4.05 MILLION/UL (ref 3.81–5.12)
WBC # BLD AUTO: 8.34 THOUSAND/UL (ref 4.31–10.16)

## 2024-01-26 PROCEDURE — 86780 TREPONEMA PALLIDUM: CPT

## 2024-01-26 PROCEDURE — 36415 COLL VENOUS BLD VENIPUNCTURE: CPT

## 2024-01-26 PROCEDURE — 85025 COMPLETE CBC W/AUTO DIFF WBC: CPT

## 2024-01-26 PROCEDURE — 86803 HEPATITIS C AB TEST: CPT

## 2024-01-26 PROCEDURE — 82950 GLUCOSE TEST: CPT

## 2024-01-27 LAB
HCV AB SER QL: NORMAL
TREPONEMA PALLIDUM IGG+IGM AB [PRESENCE] IN SERUM OR PLASMA BY IMMUNOASSAY: NORMAL

## 2024-01-29 ENCOUNTER — ROUTINE PRENATAL (OUTPATIENT)
Dept: OBGYN CLINIC | Facility: MEDICAL CENTER | Age: 29
End: 2024-01-29
Payer: COMMERCIAL

## 2024-01-29 VITALS — DIASTOLIC BLOOD PRESSURE: 70 MMHG | SYSTOLIC BLOOD PRESSURE: 124 MMHG | WEIGHT: 191.7 LBS | BODY MASS INDEX: 32.91 KG/M2

## 2024-01-29 DIAGNOSIS — O09.292 HISTORY OF PRE-ECLAMPSIA IN PRIOR PREGNANCY, CURRENTLY PREGNANT IN SECOND TRIMESTER: ICD-10-CM

## 2024-01-29 DIAGNOSIS — O43.199 MARGINAL INSERTION OF UMBILICAL CORD AFFECTING MANAGEMENT OF MOTHER: ICD-10-CM

## 2024-01-29 DIAGNOSIS — F33.1 MODERATE EPISODE OF RECURRENT MAJOR DEPRESSIVE DISORDER (HCC): ICD-10-CM

## 2024-01-29 DIAGNOSIS — Z3A.28 28 WEEKS GESTATION OF PREGNANCY: Primary | ICD-10-CM

## 2024-01-29 DIAGNOSIS — R73.09 ABNORMAL GTT (GLUCOSE TOLERANCE TEST): ICD-10-CM

## 2024-01-29 PROCEDURE — PNV: Performed by: OBSTETRICS & GYNECOLOGY

## 2024-01-29 PROCEDURE — 90471 IMMUNIZATION ADMIN: CPT | Performed by: OBSTETRICS & GYNECOLOGY

## 2024-01-29 PROCEDURE — 90715 TDAP VACCINE 7 YRS/> IM: CPT | Performed by: OBSTETRICS & GYNECOLOGY

## 2024-01-30 LAB
DME PARACHUTE DELIVERY DATE REQUESTED: NORMAL
DME PARACHUTE ITEM DESCRIPTION: NORMAL
DME PARACHUTE ORDER STATUS: NORMAL
DME PARACHUTE SUPPLIER NAME: NORMAL
DME PARACHUTE SUPPLIER PHONE: NORMAL

## 2024-01-31 ENCOUNTER — APPOINTMENT (OUTPATIENT)
Dept: LAB | Facility: HOSPITAL | Age: 29
End: 2024-01-31
Payer: COMMERCIAL

## 2024-01-31 DIAGNOSIS — Z3A.28 28 WEEKS GESTATION OF PREGNANCY: ICD-10-CM

## 2024-01-31 DIAGNOSIS — R73.09 ABNORMAL GTT (GLUCOSE TOLERANCE TEST): ICD-10-CM

## 2024-01-31 LAB
GLUCOSE 1H P 100 G GLC PO SERPL-MCNC: 153 MG/DL (ref 65–179)
GLUCOSE 2H P 100 G GLC PO SERPL-MCNC: 128 MG/DL (ref 65–154)
GLUCOSE 3H P 100 G GLC PO SERPL-MCNC: 117 MG/DL (ref 65–139)
GLUCOSE P FAST SERPL-MCNC: 80 MG/DL (ref 65–94)

## 2024-01-31 PROCEDURE — 36415 COLL VENOUS BLD VENIPUNCTURE: CPT

## 2024-01-31 PROCEDURE — 82952 GTT-ADDED SAMPLES: CPT

## 2024-01-31 PROCEDURE — 82951 GLUCOSE TOLERANCE TEST (GTT): CPT

## 2024-02-02 NOTE — PROGRESS NOTES
Problem List Items Addressed This Visit          Other    Moderate episode of recurrent major depressive disorder (HCC)    History of pre-eclampsia in prior pregnancy, currently pregnant in second trimester    28 weeks gestation of pregnancy - Primary    Relevant Orders    Glucose LISSETTE 3HR 100GM PREG PTS (Completed)    Tdap Vaccine greater than or equal to 8yo (Completed)    Marginal insertion of umbilical cord affecting management of mother     Other Visit Diagnoses       Abnormal GTT (glucose tolerance test)        Relevant Orders    Glucose LISSETTE 3HR 100GM PREG PTS (Completed)              Pt reports that she is having frontal headache  Will try hydration and magnesium

## 2024-02-07 LAB
DME PARACHUTE DELIVERY DATE ACTUAL: NORMAL
DME PARACHUTE DELIVERY DATE REQUESTED: NORMAL
DME PARACHUTE ITEM DESCRIPTION: NORMAL
DME PARACHUTE ORDER STATUS: NORMAL
DME PARACHUTE SUPPLIER NAME: NORMAL
DME PARACHUTE SUPPLIER PHONE: NORMAL

## 2024-02-12 ENCOUNTER — ROUTINE PRENATAL (OUTPATIENT)
Dept: OBGYN CLINIC | Facility: MEDICAL CENTER | Age: 29
End: 2024-02-12

## 2024-02-12 VITALS — SYSTOLIC BLOOD PRESSURE: 106 MMHG | DIASTOLIC BLOOD PRESSURE: 78 MMHG | BODY MASS INDEX: 32.72 KG/M2 | WEIGHT: 190.6 LBS

## 2024-02-12 DIAGNOSIS — O99.343 DEPRESSION AFFECTING PREGNANCY IN THIRD TRIMESTER, ANTEPARTUM: ICD-10-CM

## 2024-02-12 DIAGNOSIS — F32.A DEPRESSION AFFECTING PREGNANCY IN THIRD TRIMESTER, ANTEPARTUM: ICD-10-CM

## 2024-02-12 DIAGNOSIS — Z34.93 THIRD TRIMESTER PREGNANCY: Primary | ICD-10-CM

## 2024-02-12 DIAGNOSIS — O09.293 HISTORY OF PRE-ECLAMPSIA IN PRIOR PREGNANCY, CURRENTLY PREGNANT IN THIRD TRIMESTER: ICD-10-CM

## 2024-02-12 DIAGNOSIS — O43.199 MARGINAL INSERTION OF UMBILICAL CORD AFFECTING MANAGEMENT OF MOTHER: ICD-10-CM

## 2024-02-12 DIAGNOSIS — Z3A.30 30 WEEKS GESTATION OF PREGNANCY: ICD-10-CM

## 2024-02-12 PROCEDURE — PNV: Performed by: OBSTETRICS & GYNECOLOGY

## 2024-02-12 NOTE — PROGRESS NOTES
Assessment  28 y.o.  at 30w0d presenting for routine prenatal visit.     Plan  Diagnoses and all orders for this visit:    Third trimester pregnancy  30 weeks gestation of pregnancy  - PTL precautions  - FKC  - Reviewed safe mgmt of cold symptoms  - Return in 2wk for PN    Marginal insertion of umbilical cord affecting management of mother  - Follows with MFM  - Growth US upcoming (~32wks)    History of pre-eclampsia in prior pregnancy, currently pregnant in third trimester  - Routine BP and symptom monitoring  - ASA     Depression affecting pregnancy in third trimester, antepartum  - Zoloft    ____________________________________________________________        Subjective    Carola Paula is a 28 y.o.  at 30w0d who presents for routine prenatal visit. She is reporting nasal congestion. Had fever and body aches Friday at onset. These improved but lingering congestion. Tried Tylenol and saline spray. Reviewed safe options in pregnancy; reports flonase usually works well for her and has at home.  She has some lower BP that her baseline (100/60s) and mild dizziness. Discuss importance of hydration with inc mucus production. Denies regular contractions, loss of fluid, or vaginal bleeding. She feels regular fetal movements.     Pregnancy Problems:  Patient Active Problem List   Diagnosis    Hyperlipidemia    Moderate episode of recurrent major depressive disorder (HCC)    Vitamin D deficiency    Anxiety    History of pre-eclampsia in prior pregnancy, currently pregnant in third trimester    Obesity in pregnancy    Depression affecting pregnancy in third trimester, antepartum    30 weeks gestation of pregnancy    Nausea and vomiting in pregnancy prior to 22 weeks gestation    Marginal insertion of umbilical cord affecting management of mother         Objective  /78   Wt 86.5 kg (190 lb 9.6 oz)   LMP 2023   BMI 32.72 kg/m²     FHT: 145 BPM   Uterine Size: 30 cm     Physical Exam:  Physical  Exam  Constitutional:       General: She is not in acute distress.     Appearance: Normal appearance. She is well-developed. She is not ill-appearing, toxic-appearing or diaphoretic.   HENT:      Head: Normocephalic and atraumatic.   Eyes:      General: No scleral icterus.        Right eye: No discharge.         Left eye: No discharge.      Conjunctiva/sclera: Conjunctivae normal.   Pulmonary:      Effort: Pulmonary effort is normal. No accessory muscle usage or respiratory distress.   Abdominal:      General: There is distension (gravid).      Tenderness: There is no abdominal tenderness. There is no guarding or rebound.   Skin:     General: Skin is warm and dry.      Coloration: Skin is not jaundiced.      Findings: No bruising, erythema or rash.   Neurological:      Mental Status: She is alert.   Psychiatric:         Mood and Affect: Mood normal.         Behavior: Behavior normal.         Thought Content: Thought content normal.         Judgment: Judgment normal.

## 2024-02-21 PROBLEM — Z3A.32 32 WEEKS GESTATION OF PREGNANCY: Status: ACTIVE | Noted: 2023-10-13

## 2024-02-21 PROBLEM — O16.3 HYPERTENSION DURING PREGNANCY IN THIRD TRIMESTER: Status: RESOLVED | Noted: 2021-02-25 | Resolved: 2024-02-21

## 2024-02-21 PROBLEM — O21.9 NAUSEA AND VOMITING IN PREGNANCY PRIOR TO 22 WEEKS GESTATION: Status: RESOLVED | Noted: 2023-10-13 | Resolved: 2024-02-21

## 2024-02-21 NOTE — PROGRESS NOTES
Problem List Items Addressed This Visit          Cardiovascular and Mediastinum    RESOLVED: Hypertension during pregnancy in third trimester       Other    History of pre-eclampsia in prior pregnancy, currently pregnant in third trimester      mg till 36 weeks  Growth in 3rd trimester 3//4  Baseline labs 10/23- normal          Depression affecting pregnancy in third trimester, antepartum     Cont the zoloft         32 weeks gestation of pregnancy - Primary     NIPT normal   Level 2 reviewed   next scan- 3/4         Marginal insertion of umbilical cord affecting management of mother     Unsure if still present   not identified at the 20 week scan   Has follow up on 3/4

## 2024-02-26 ENCOUNTER — ROUTINE PRENATAL (OUTPATIENT)
Dept: OBGYN CLINIC | Facility: MEDICAL CENTER | Age: 29
End: 2024-02-26

## 2024-02-26 VITALS — SYSTOLIC BLOOD PRESSURE: 110 MMHG | WEIGHT: 192.5 LBS | BODY MASS INDEX: 33.04 KG/M2 | DIASTOLIC BLOOD PRESSURE: 60 MMHG

## 2024-02-26 DIAGNOSIS — F32.A DEPRESSION AFFECTING PREGNANCY IN THIRD TRIMESTER, ANTEPARTUM: ICD-10-CM

## 2024-02-26 DIAGNOSIS — Z3A.32 32 WEEKS GESTATION OF PREGNANCY: Primary | ICD-10-CM

## 2024-02-26 DIAGNOSIS — O99.343 DEPRESSION AFFECTING PREGNANCY IN THIRD TRIMESTER, ANTEPARTUM: ICD-10-CM

## 2024-02-26 DIAGNOSIS — O43.199 MARGINAL INSERTION OF UMBILICAL CORD AFFECTING MANAGEMENT OF MOTHER: ICD-10-CM

## 2024-02-26 DIAGNOSIS — O09.293 HISTORY OF PRE-ECLAMPSIA IN PRIOR PREGNANCY, CURRENTLY PREGNANT IN THIRD TRIMESTER: ICD-10-CM

## 2024-02-26 DIAGNOSIS — O10.013 PRE-EXISTING ESSENTIAL HYPERTENSION DURING PREGNANCY IN THIRD TRIMESTER: ICD-10-CM

## 2024-02-26 PROCEDURE — PNV: Performed by: OBSTETRICS & GYNECOLOGY

## 2024-03-04 ENCOUNTER — ULTRASOUND (OUTPATIENT)
Dept: PERINATAL CARE | Facility: OTHER | Age: 29
End: 2024-03-04
Payer: COMMERCIAL

## 2024-03-04 VITALS
HEIGHT: 64 IN | SYSTOLIC BLOOD PRESSURE: 112 MMHG | BODY MASS INDEX: 33.32 KG/M2 | WEIGHT: 195.2 LBS | HEART RATE: 101 BPM | DIASTOLIC BLOOD PRESSURE: 64 MMHG

## 2024-03-04 DIAGNOSIS — Z3A.33 33 WEEKS GESTATION OF PREGNANCY: ICD-10-CM

## 2024-03-04 DIAGNOSIS — O43.199 MARGINAL INSERTION OF UMBILICAL CORD AFFECTING MANAGEMENT OF MOTHER: ICD-10-CM

## 2024-03-04 DIAGNOSIS — O35.EXX0 FETAL RENAL ANOMALY, SINGLE GESTATION: Primary | ICD-10-CM

## 2024-03-04 PROCEDURE — 76816 OB US FOLLOW-UP PER FETUS: CPT | Performed by: OBSTETRICS & GYNECOLOGY

## 2024-03-04 PROCEDURE — 99213 OFFICE O/P EST LOW 20 MIN: CPT | Performed by: OBSTETRICS & GYNECOLOGY

## 2024-03-04 NOTE — PROGRESS NOTES
The patient was seen today for an ultrasound.  Please see ultrasound report (located under Ob Procedures) for additional details.   Thank you very much for allowing us to participate in the care of this very nice patient.  Should you have any questions, please do not hesitate to contact me.     Richard Izaguirre MD FACOG  Attending Physician, Maternal-Fetal Medicine  [facility]

## 2024-03-12 PROBLEM — Z3A.34 34 WEEKS GESTATION OF PREGNANCY: Status: ACTIVE | Noted: 2023-10-13

## 2024-03-12 NOTE — PROGRESS NOTES
Problem List Items Addressed This Visit          Genitourinary    Fetal renal anomaly, single gestation     MFM measurements and note.   Unilateral fetal renal pyelectasis is appreciated. The left renal pelvis measures 12.3 mm transverse A-P diameter, which is mildly dilated for gestational age (with 7 mm being at the upper limit of normal for gestational age).            Behavioral Health    Depression affecting pregnancy in third trimester, antepartum     Cont the zoloft            Obstetrics/Gynecology    History of pre-eclampsia in prior pregnancy, currently pregnant in third trimester      mg till 36 weeks  Baseline labs 10/23- normal          34 weeks gestation of pregnancy - Primary     NIPT normal   Level 2 reviewed  3/4 baby in the 93% with the AC in the 99%  1 hour was 143  Normal 3 hour testing   We discussed shoulder dystocia and risk of such   Has follow up growth  - 4/3-

## 2024-03-12 NOTE — ASSESSMENT & PLAN NOTE
NIPT normal   Level 2 reviewed  3/4 baby in the 93% with the AC in the 99%  1 hour was 143  Normal 3 hour testing   We discussed shoulder dystocia and risk of such   Has follow up growth  - 4/3-

## 2024-03-12 NOTE — ASSESSMENT & PLAN NOTE
MFM measurements and note.   Unilateral fetal renal pyelectasis is appreciated. The left renal pelvis measures 12.3 mm transverse A-P diameter, which is mildly dilated for gestational age (with 7 mm being at the upper limit of normal for gestational age).

## 2024-03-13 ENCOUNTER — ROUTINE PRENATAL (OUTPATIENT)
Dept: OBGYN CLINIC | Facility: MEDICAL CENTER | Age: 29
End: 2024-03-13

## 2024-03-13 VITALS — SYSTOLIC BLOOD PRESSURE: 120 MMHG | WEIGHT: 194.4 LBS | BODY MASS INDEX: 33.37 KG/M2 | DIASTOLIC BLOOD PRESSURE: 60 MMHG

## 2024-03-13 DIAGNOSIS — F32.A DEPRESSION AFFECTING PREGNANCY IN THIRD TRIMESTER, ANTEPARTUM: ICD-10-CM

## 2024-03-13 DIAGNOSIS — O09.293 HISTORY OF PRE-ECLAMPSIA IN PRIOR PREGNANCY, CURRENTLY PREGNANT IN THIRD TRIMESTER: ICD-10-CM

## 2024-03-13 DIAGNOSIS — O99.343 DEPRESSION AFFECTING PREGNANCY IN THIRD TRIMESTER, ANTEPARTUM: ICD-10-CM

## 2024-03-13 DIAGNOSIS — O35.EXX0 FETAL RENAL ANOMALY, SINGLE GESTATION: ICD-10-CM

## 2024-03-13 DIAGNOSIS — Z3A.34 34 WEEKS GESTATION OF PREGNANCY: Primary | ICD-10-CM

## 2024-03-13 PROCEDURE — PNV: Performed by: OBSTETRICS & GYNECOLOGY

## 2024-03-23 PROBLEM — Z3A.36 36 WEEKS GESTATION OF PREGNANCY: Status: ACTIVE | Noted: 2023-10-13

## 2024-03-24 NOTE — PROGRESS NOTES
Routine Prenatal Visit  [facility] [address], [place], [place]    Assessment/Plan:  Carola is a 28 y.o. year old  at 35w5d who presents for routine prenatal visit.     1. 36 weeks gestation of pregnancy  Assessment & Plan:  NIPT normal   Level 2 reviewed  3/4 baby in the 93% with the AC in the 99%  1 hour was 143  Normal 3 hour testing   We discussed shoulder dystocia and risk of such   Has follow up growth  - 4/3-    Orders:  -     Strep B DNA probe, amplification    2. Depression affecting pregnancy in third trimester, antepartum  Assessment & Plan:  Cont the zoloft      3. Fetal renal anomaly, single gestation  Assessment & Plan:  MFM measurements and note.   Unilateral fetal renal pyelectasis is appreciated. The left renal pelvis measures 12.3 mm transverse A-P diameter, which is mildly dilated for gestational age (with 7 mm being at the upper limit of normal for gestational age).      4. History of pre-eclampsia in prior pregnancy, currently pregnant in third trimester  Assessment & Plan:   mg till 36 weeks  Baseline labs 10/23- normal     Orders:  -     Protein / creatinine ratio, urine; Future  -     Comprehensive metabolic panel; Future  -     CBC and differential; Future        Subjective:     CC: Prenatal care    Carloa Paula is a 28 y.o.  female who presents for routine prenatal care at 35w5d.  Pregnancy ROS: bno leakage of fluid, pelvic pain, or vaginal bleeding.  yes fetal movement.    BP monitoring at home has been 130s/80  Today also   Symptoms - had occasional headache  She did take the Bp at home when she was feeling dizzy and the BP was 138/87  Pec labs today   Precautions given     Follow up weekly   The following portions of the patient's history were reviewed and updated as appropriate: allergies, current medications, past family history, past medical history, obstetric history, gynecologic history, past social history, past surgical  history and problem list.      Objective:  /80   Wt 89.4 kg (197 lb 3.2 oz)   LMP 2023   BMI 33.85 kg/m²   Pregravid Weight/BMI: 79.4 kg (175 lb) (BMI 30.02)  Current Weight: 89.4 kg (197 lb 3.2 oz)   Total Weight Gain: 10.1 kg (22 lb 3.2 oz)   Pre- Vitals      Flowsheet Row Most Recent Value   Prenatal Assessment    Fetal Heart Rate 150   Movement Present   Prenatal Vitals    Blood Pressure 134/80   Weight - Scale 89.4 kg (197 lb 3.2 oz)   Urine Albumin/Glucose    Dilation/Effacement/Station    Vaginal Drainage    Draining Fluid No   Edema              General: Well appearing, no distress  Respiratory: Unlabored breathing  Cardiovascular: Regular rate.  Abdomen: Soft, gravid, nontender  Fundal Height: Appropriate for gestational age.  Extremities: Warm and well perfused.  Non tender.

## 2024-03-25 ENCOUNTER — ROUTINE PRENATAL (OUTPATIENT)
Dept: OBGYN CLINIC | Facility: MEDICAL CENTER | Age: 29
End: 2024-03-25

## 2024-03-25 ENCOUNTER — APPOINTMENT (OUTPATIENT)
Dept: LAB | Facility: MEDICAL CENTER | Age: 29
End: 2024-03-25
Payer: COMMERCIAL

## 2024-03-25 VITALS — BODY MASS INDEX: 33.85 KG/M2 | WEIGHT: 197.2 LBS | DIASTOLIC BLOOD PRESSURE: 80 MMHG | SYSTOLIC BLOOD PRESSURE: 134 MMHG

## 2024-03-25 DIAGNOSIS — F32.A DEPRESSION AFFECTING PREGNANCY IN THIRD TRIMESTER, ANTEPARTUM: ICD-10-CM

## 2024-03-25 DIAGNOSIS — Z3A.36 36 WEEKS GESTATION OF PREGNANCY: Primary | ICD-10-CM

## 2024-03-25 DIAGNOSIS — O99.343 DEPRESSION AFFECTING PREGNANCY IN THIRD TRIMESTER, ANTEPARTUM: ICD-10-CM

## 2024-03-25 DIAGNOSIS — O35.EXX0 FETAL RENAL ANOMALY, SINGLE GESTATION: ICD-10-CM

## 2024-03-25 DIAGNOSIS — O09.293 HISTORY OF PRE-ECLAMPSIA IN PRIOR PREGNANCY, CURRENTLY PREGNANT IN THIRD TRIMESTER: ICD-10-CM

## 2024-03-25 LAB
ALBUMIN SERPL BCP-MCNC: 3.8 G/DL (ref 3.5–5)
ALP SERPL-CCNC: 97 U/L (ref 34–104)
ALT SERPL W P-5'-P-CCNC: 11 U/L (ref 7–52)
ANION GAP SERPL CALCULATED.3IONS-SCNC: 9 MMOL/L (ref 4–13)
AST SERPL W P-5'-P-CCNC: 13 U/L (ref 13–39)
BASOPHILS # BLD AUTO: 0.02 THOUSANDS/ÂΜL (ref 0–0.1)
BASOPHILS NFR BLD AUTO: 0 % (ref 0–1)
BILIRUB SERPL-MCNC: 0.36 MG/DL (ref 0.2–1)
BUN SERPL-MCNC: 8 MG/DL (ref 5–25)
CALCIUM SERPL-MCNC: 9.2 MG/DL (ref 8.4–10.2)
CHLORIDE SERPL-SCNC: 108 MMOL/L (ref 96–108)
CO2 SERPL-SCNC: 22 MMOL/L (ref 21–32)
CREAT SERPL-MCNC: 0.68 MG/DL (ref 0.6–1.3)
CREAT UR-MCNC: 38.2 MG/DL
EOSINOPHIL # BLD AUTO: 0.07 THOUSAND/ÂΜL (ref 0–0.61)
EOSINOPHIL NFR BLD AUTO: 1 % (ref 0–6)
ERYTHROCYTE [DISTWIDTH] IN BLOOD BY AUTOMATED COUNT: 13.9 % (ref 11.6–15.1)
GFR SERPL CREATININE-BSD FRML MDRD: 117 ML/MIN/1.73SQ M
GLUCOSE P FAST SERPL-MCNC: 83 MG/DL (ref 65–99)
HCT VFR BLD AUTO: 38.4 % (ref 34.8–46.1)
HGB BLD-MCNC: 13.1 G/DL (ref 11.5–15.4)
IMM GRANULOCYTES # BLD AUTO: 0.06 THOUSAND/UL (ref 0–0.2)
IMM GRANULOCYTES NFR BLD AUTO: 1 % (ref 0–2)
LYMPHOCYTES # BLD AUTO: 0.9 THOUSANDS/ÂΜL (ref 0.6–4.47)
LYMPHOCYTES NFR BLD AUTO: 10 % (ref 14–44)
MCH RBC QN AUTO: 31.4 PG (ref 26.8–34.3)
MCHC RBC AUTO-ENTMCNC: 34.1 G/DL (ref 31.4–37.4)
MCV RBC AUTO: 92 FL (ref 82–98)
MONOCYTES # BLD AUTO: 0.85 THOUSAND/ÂΜL (ref 0.17–1.22)
MONOCYTES NFR BLD AUTO: 10 % (ref 4–12)
NEUTROPHILS # BLD AUTO: 7.01 THOUSANDS/ÂΜL (ref 1.85–7.62)
NEUTS SEG NFR BLD AUTO: 78 % (ref 43–75)
NRBC BLD AUTO-RTO: 0 /100 WBCS
PLATELET # BLD AUTO: 153 THOUSANDS/UL (ref 149–390)
PMV BLD AUTO: 11.2 FL (ref 8.9–12.7)
POTASSIUM SERPL-SCNC: 3.7 MMOL/L (ref 3.5–5.3)
PROT SERPL-MCNC: 6.6 G/DL (ref 6.4–8.4)
PROT UR-MCNC: 10 MG/DL
PROT/CREAT UR: 0.26 MG/G{CREAT} (ref 0–0.1)
RBC # BLD AUTO: 4.17 MILLION/UL (ref 3.81–5.12)
SODIUM SERPL-SCNC: 139 MMOL/L (ref 135–147)
WBC # BLD AUTO: 8.91 THOUSAND/UL (ref 4.31–10.16)

## 2024-03-25 PROCEDURE — 85025 COMPLETE CBC W/AUTO DIFF WBC: CPT

## 2024-03-25 PROCEDURE — PNV: Performed by: OBSTETRICS & GYNECOLOGY

## 2024-03-25 PROCEDURE — 80053 COMPREHEN METABOLIC PANEL: CPT

## 2024-03-25 PROCEDURE — 36415 COLL VENOUS BLD VENIPUNCTURE: CPT

## 2024-03-25 PROCEDURE — 84156 ASSAY OF PROTEIN URINE: CPT

## 2024-03-25 PROCEDURE — 82570 ASSAY OF URINE CREATININE: CPT

## 2024-03-25 PROCEDURE — 87150 DNA/RNA AMPLIFIED PROBE: CPT | Performed by: OBSTETRICS & GYNECOLOGY

## 2024-03-27 LAB — GP B STREP DNA SPEC QL NAA+PROBE: NEGATIVE

## 2024-03-29 ENCOUNTER — NURSE TRIAGE (OUTPATIENT)
Age: 29
End: 2024-03-29

## 2024-03-29 ENCOUNTER — HOSPITAL ENCOUNTER (OUTPATIENT)
Facility: HOSPITAL | Age: 29
Discharge: HOME | End: 2024-03-29
Attending: OBSTETRICS & GYNECOLOGY | Admitting: OBSTETRICS & GYNECOLOGY
Payer: COMMERCIAL

## 2024-03-29 VITALS
BODY MASS INDEX: 33.67 KG/M2 | TEMPERATURE: 98.2 F | SYSTOLIC BLOOD PRESSURE: 118 MMHG | HEART RATE: 100 BPM | WEIGHT: 197.2 LBS | RESPIRATION RATE: 18 BRPM | DIASTOLIC BLOOD PRESSURE: 64 MMHG | HEIGHT: 64 IN

## 2024-03-29 PROBLEM — R00.0 RACING HEART BEAT: Status: ACTIVE | Noted: 2024-03-29

## 2024-03-29 LAB
ALBUMIN SERPL BCP-MCNC: 3.7 G/DL (ref 3.5–5)
ALP SERPL-CCNC: 110 U/L (ref 34–104)
ALT SERPL W P-5'-P-CCNC: 11 U/L (ref 7–52)
ANION GAP SERPL CALCULATED.3IONS-SCNC: 9 MMOL/L (ref 4–13)
AST SERPL W P-5'-P-CCNC: 12 U/L (ref 13–39)
BASOPHILS # BLD AUTO: 0.01 THOUSANDS/ÂΜL (ref 0–0.1)
BASOPHILS NFR BLD AUTO: 0 % (ref 0–1)
BILIRUB SERPL-MCNC: 0.39 MG/DL (ref 0.2–1)
BUN SERPL-MCNC: 7 MG/DL (ref 5–25)
CALCIUM SERPL-MCNC: 9.1 MG/DL (ref 8.4–10.2)
CHLORIDE SERPL-SCNC: 106 MMOL/L (ref 96–108)
CO2 SERPL-SCNC: 21 MMOL/L (ref 21–32)
CREAT SERPL-MCNC: 0.65 MG/DL (ref 0.6–1.3)
CREAT UR-MCNC: 69.3 MG/DL
EOSINOPHIL # BLD AUTO: 0.05 THOUSAND/ÂΜL (ref 0–0.61)
EOSINOPHIL NFR BLD AUTO: 1 % (ref 0–6)
ERYTHROCYTE [DISTWIDTH] IN BLOOD BY AUTOMATED COUNT: 13.6 % (ref 11.6–15.1)
GFR SERPL CREATININE-BSD FRML MDRD: 119 ML/MIN/1.73SQ M
GLUCOSE SERPL-MCNC: 111 MG/DL (ref 65–140)
HCT VFR BLD AUTO: 37.9 % (ref 34.8–46.1)
HGB BLD-MCNC: 13 G/DL (ref 11.5–15.4)
IMM GRANULOCYTES # BLD AUTO: 0.04 THOUSAND/UL (ref 0–0.2)
IMM GRANULOCYTES NFR BLD AUTO: 1 % (ref 0–2)
LYMPHOCYTES # BLD AUTO: 1.1 THOUSANDS/ÂΜL (ref 0.6–4.47)
LYMPHOCYTES NFR BLD AUTO: 13 % (ref 14–44)
MCH RBC QN AUTO: 30.9 PG (ref 26.8–34.3)
MCHC RBC AUTO-ENTMCNC: 34.3 G/DL (ref 31.4–37.4)
MCV RBC AUTO: 90 FL (ref 82–98)
MONOCYTES # BLD AUTO: 0.69 THOUSAND/ÂΜL (ref 0.17–1.22)
MONOCYTES NFR BLD AUTO: 8 % (ref 4–12)
NEUTROPHILS # BLD AUTO: 6.76 THOUSANDS/ÂΜL (ref 1.85–7.62)
NEUTS SEG NFR BLD AUTO: 77 % (ref 43–75)
NRBC BLD AUTO-RTO: 0 /100 WBCS
PLATELET # BLD AUTO: 146 THOUSANDS/UL (ref 149–390)
PMV BLD AUTO: 10.8 FL (ref 8.9–12.7)
POTASSIUM SERPL-SCNC: 3.3 MMOL/L (ref 3.5–5.3)
PROT SERPL-MCNC: 6.5 G/DL (ref 6.4–8.4)
PROT UR-MCNC: 17 MG/DL
PROT/CREAT UR: 0.25 MG/G{CREAT} (ref 0–0.1)
RBC # BLD AUTO: 4.21 MILLION/UL (ref 3.81–5.12)
SODIUM SERPL-SCNC: 136 MMOL/L (ref 135–147)
WBC # BLD AUTO: 8.65 THOUSAND/UL (ref 4.31–10.16)

## 2024-03-29 PROCEDURE — 84156 ASSAY OF PROTEIN URINE: CPT

## 2024-03-29 PROCEDURE — 80053 COMPREHEN METABOLIC PANEL: CPT

## 2024-03-29 PROCEDURE — 85025 COMPLETE CBC W/AUTO DIFF WBC: CPT

## 2024-03-29 PROCEDURE — NC001 PR NO CHARGE: Performed by: OBSTETRICS & GYNECOLOGY

## 2024-03-29 PROCEDURE — 99202 OFFICE O/P NEW SF 15 MIN: CPT

## 2024-03-29 PROCEDURE — 82570 ASSAY OF URINE CREATININE: CPT

## 2024-03-29 NOTE — TELEPHONE ENCOUNTER
"Patient 36w4d called stating she feels like her BP is high. She is at work and does not have a way to check it. C/O dizziness, lightheaded, heart racing, cold hands. Denies HA, but had one this AM 0600 relieved with Tylenol, denies RUQ pain, visual changes, States she had nausea this AM but subsided after eating. +FM, denies ctx, LOF, VB.     Tiger Text on call provider.Advised patient go to L&D for evaluation.Tiger Text [facility] charge       Reason for Disposition  • Pregnant 20 or more weeks or postpartum (< 6 weeks after delivery) with new hand or face swelling    Answer Assessment - Initial Assessment Questions  1. BLOOD PRESSURE: \"What is the blood pressure?\" \"Did you take at least two measurements 5 minutes apart?\"      Unable to take      4. HISTORY: \"Do you have a history of high blood pressure?\"      Pre-e in prior pregnancy  5. MEDICATIONS: \"Are you taking any medications for blood pressure?\" \"Have you missed any doses recently?\"      No meds  6. OTHER SYMPTOMS: \"Do you have any symptoms?\" (e.g., headache, chest pain, blurred vision, difficulty breathing, weakness)      Dizzy, heart racing, lightheaded, cold hands, HA this AM relieved with Tylenol  7. PREGNANCY: \"Is there any chance you are pregnant?\" \"When was your last menstrual period?\"      36w4d    Protocols used: Blood Pressure - High-ADULT-OH    "

## 2024-03-29 NOTE — PROGRESS NOTES
"L&D Triage Note - OB/GYN  Carola Paula 28 y.o. female MRN: [id number]  Unit/Bed#: LD TRIAGE 3 Encounter: [id number]    Patient is seen by [facility]    ASSESSMENT  Carola Paula is a 28 y.o.  at 36w4d presenting with feeling \"off.\" More tired than usual, feels like heart is racing, reports some swelling in her hands. No documented elevated blood pressures in this pregnancy.     PLAN  #1. Rule out PEC:   CBC/CMP wnl  UPC: 0.25  Normotensive   Discussed taking BP twice daily after resting for 15 minutes  Will call with blood pressures >140/90 or preeclampsia symptoms    #2. Hypokalemia:  Declines Kdur and will eat potassium rich foods    Discharge instructions  Patient instructed to call if experiencing worsening contractions, vaginal bleeding, loss of fluid or decreased fetal movement.  Will follow up with OBGYN on 24.    D/w Dr. Mary  ______________    SUBJECTIVE    JULIETTE: Estimated Date of Delivery: 24    HPI:  28 y.o.  36w4d presents with complaint of feeling \"off\". She feels more fatigued than usual and feels like her heart is racing. She complains of swelling in her fingers and wrists, but mostly unchanged from baseline. She has a history of preeclampsia in a prior pregnancy. She has not had any elevated blood pressures in this pregnancy. Reports some RUQ tenderness in this pregnancy, but attributes the soreness to fetal movement. Denies chest pain, SOB, lower extremity pain, swelling ,dysuria, hematuria, abdominal pain.      Contractions: denies  Leakage of fluid: denies  Vaginal Bleeding: denies  Fetal movement: present    Her obstetrical history is significant for preeclampsia, MDD    ROS:  Constitutional: Fatigue  Respiratory: Negative  Cardiovascular: Heart racing  Gastrointestinal: Negative    OBJECTIVE:    Vitals:  /80   Pulse 94   Temp 98.2 °F (36.8 °C) (Oral)   Resp 18   Ht 5' 4\" (1.626 m)   Wt 89.4 kg (197 lb 3.2 oz)   LMP 2023   BMI 33.85 kg/m²   Body mass " index is 33.85 kg/m².    Physical Exam  Vitals reviewed.   Constitutional:       General: She is not in acute distress.  HENT:      Head: Normocephalic.      Mouth/Throat:      Mouth: Mucous membranes are moist.      Pharynx: Oropharynx is clear.   Eyes:      General: No scleral icterus.     Conjunctiva/sclera: Conjunctivae normal.   Cardiovascular:      Rate and Rhythm: Normal rate and regular rhythm.      Heart sounds: Normal heart sounds.   Pulmonary:      Effort: Pulmonary effort is normal. No respiratory distress.      Breath sounds: Normal breath sounds.   Abdominal:      Palpations: Abdomen is soft.      Tenderness: There is no abdominal tenderness. There is no guarding.      Comments: Gravid   Musculoskeletal:         General: No tenderness.      Right lower leg: No edema.      Left lower leg: No edema.   Skin:     General: Skin is warm and dry.      Coloration: Skin is not jaundiced.   Neurological:      Mental Status: She is alert.   Psychiatric:         Mood and Affect: Mood normal.         Behavior: Behavior normal.         SVE:  Patient declines    FHT:  Baseline: 135  Moderate variability  15x15 accelerations  No decelerations    TOCO:   No contractions    Labs:   Recent Results (from the past 24 hour(s))   CBC and differential    Collection Time: 03/29/24  3:00 PM   Result Value Ref Range    WBC 8.65 4.31 - 10.16 Thousand/uL    RBC 4.21 3.81 - 5.12 Million/uL    Hemoglobin 13.0 11.5 - 15.4 g/dL    Hematocrit 37.9 34.8 - 46.1 %    MCV 90 82 - 98 fL    MCH 30.9 26.8 - 34.3 pg    MCHC 34.3 31.4 - 37.4 g/dL    RDW 13.6 11.6 - 15.1 %    MPV 10.8 8.9 - 12.7 fL    Platelets 146 (L) 149 - 390 Thousands/uL    nRBC 0 /100 WBCs    Neutrophils Relative 77 (H) 43 - 75 %    Immature Grans % 1 0 - 2 %    Lymphocytes Relative 13 (L) 14 - 44 %    Monocytes Relative 8 4 - 12 %    Eosinophils Relative 1 0 - 6 %    Basophils Relative 0 0 - 1 %    Neutrophils Absolute 6.76 1.85 - 7.62 Thousands/µL    Absolute Immature  Grans 0.04 0.00 - 0.20 Thousand/uL    Absolute Lymphocytes 1.10 0.60 - 4.47 Thousands/µL    Absolute Monocytes 0.69 0.17 - 1.22 Thousand/µL    Eosinophils Absolute 0.05 0.00 - 0.61 Thousand/µL    Basophils Absolute 0.01 0.00 - 0.10 Thousands/µL   Comprehensive metabolic panel    Collection Time: 03/29/24  3:00 PM   Result Value Ref Range    Sodium 136 135 - 147 mmol/L    Potassium 3.3 (L) 3.5 - 5.3 mmol/L    Chloride 106 96 - 108 mmol/L    CO2 21 21 - 32 mmol/L    ANION GAP 9 4 - 13 mmol/L    BUN 7 5 - 25 mg/dL    Creatinine 0.65 0.60 - 1.30 mg/dL    Glucose 111 65 - 140 mg/dL    Calcium 9.1 8.4 - 10.2 mg/dL    AST 12 (L) 13 - 39 U/L    ALT 11 7 - 52 U/L    Alkaline Phosphatase 110 (H) 34 - 104 U/L    Total Protein 6.5 6.4 - 8.4 g/dL    Albumin 3.7 3.5 - 5.0 g/dL    Total Bilirubin 0.39 0.20 - 1.00 mg/dL    eGFR 119 ml/min/1.73sq m   Protein / creatinine ratio, urine    Collection Time: 03/29/24  3:00 PM   Result Value Ref Range    Creatinine, Ur 69.3 Reference range not established. mg/dL    Protein Urine Random 17 Reference range not established. mg/dL    Prot/Creat Ratio, Ur 0.25 (H) 0.00 - 0.10           Indigo Dowell MD  3/29/2024  3:54 PM

## 2024-04-01 ENCOUNTER — ROUTINE PRENATAL (OUTPATIENT)
Dept: OBGYN CLINIC | Facility: MEDICAL CENTER | Age: 29
End: 2024-04-01

## 2024-04-01 VITALS — WEIGHT: 197 LBS | DIASTOLIC BLOOD PRESSURE: 80 MMHG | SYSTOLIC BLOOD PRESSURE: 128 MMHG | BODY MASS INDEX: 33.81 KG/M2

## 2024-04-01 DIAGNOSIS — O99.210 OBESITY IN PREGNANCY: ICD-10-CM

## 2024-04-01 DIAGNOSIS — O09.293 HISTORY OF PRE-ECLAMPSIA IN PRIOR PREGNANCY, CURRENTLY PREGNANT IN THIRD TRIMESTER: ICD-10-CM

## 2024-04-01 DIAGNOSIS — F32.A DEPRESSION AFFECTING PREGNANCY IN THIRD TRIMESTER, ANTEPARTUM: Primary | ICD-10-CM

## 2024-04-01 DIAGNOSIS — O99.343 DEPRESSION AFFECTING PREGNANCY IN THIRD TRIMESTER, ANTEPARTUM: Primary | ICD-10-CM

## 2024-04-01 DIAGNOSIS — O35.EXX0 FETAL RENAL ANOMALY, SINGLE GESTATION: ICD-10-CM

## 2024-04-01 DIAGNOSIS — Z3A.36 36 WEEKS GESTATION OF PREGNANCY: ICD-10-CM

## 2024-04-01 PROCEDURE — PNV: Performed by: NURSE PRACTITIONER

## 2024-04-01 NOTE — PROGRESS NOTES
Denies loss of fluid, vaginal bleeding and regular contractions.  Confirms frequent fetal movement.  Doing fetal kick counts.  Tolerating prenatal vitamin and Zoloft well.  GBS negative, reviewed with patient.  Denies thoughts of self-harm or harm to others.  Denies questions or concerns at today's visit  BP: 128/80 weight: + 22lb SVE FT/ / -2; posterior  Plan  -Continue prenatal vitamin daily  -Continue fetal kick counts daily  - Continue vaginal/perineal massage 1-4 times per week  -Depression in pregnancy continue Zoloft as ordered.  -follow-up with  center scheduled for 4/3/24  -Common discomforts of pregnancy and precautions reviewed.  Signs and symptoms of labor reviewed.  RTO 1 week

## 2024-04-03 ENCOUNTER — ULTRASOUND (OUTPATIENT)
Age: 29
End: 2024-04-03
Payer: COMMERCIAL

## 2024-04-03 VITALS
BODY MASS INDEX: 33.97 KG/M2 | HEIGHT: 64 IN | DIASTOLIC BLOOD PRESSURE: 76 MMHG | SYSTOLIC BLOOD PRESSURE: 126 MMHG | HEART RATE: 98 BPM | WEIGHT: 199 LBS

## 2024-04-03 DIAGNOSIS — Z36.89 ENCOUNTER FOR ULTRASOUND TO ASSESS FETAL GROWTH: ICD-10-CM

## 2024-04-03 DIAGNOSIS — F32.A DEPRESSION AFFECTING PREGNANCY IN THIRD TRIMESTER, ANTEPARTUM: ICD-10-CM

## 2024-04-03 DIAGNOSIS — Z3A.37 37 WEEKS GESTATION OF PREGNANCY: ICD-10-CM

## 2024-04-03 DIAGNOSIS — O09.293 HISTORY OF PRE-ECLAMPSIA IN PRIOR PREGNANCY, CURRENTLY PREGNANT IN THIRD TRIMESTER: ICD-10-CM

## 2024-04-03 DIAGNOSIS — O35.EXX0 FETAL RENAL ANOMALY, SINGLE GESTATION: Primary | ICD-10-CM

## 2024-04-03 DIAGNOSIS — O99.343 DEPRESSION AFFECTING PREGNANCY IN THIRD TRIMESTER, ANTEPARTUM: ICD-10-CM

## 2024-04-03 DIAGNOSIS — O99.210 OBESITY IN PREGNANCY: ICD-10-CM

## 2024-04-03 PROCEDURE — 76816 OB US FOLLOW-UP PER FETUS: CPT | Performed by: OBSTETRICS & GYNECOLOGY

## 2024-04-03 PROCEDURE — 99213 OFFICE O/P EST LOW 20 MIN: CPT | Performed by: OBSTETRICS & GYNECOLOGY

## 2024-04-03 NOTE — PROGRESS NOTES
"[facility]: Carola Paula was seen today for fetal growth assessment ultrasound.  See ultrasound report under \"OB Procedures\" tab.   The time spent on this established patient on the encounter date included 5 minutes previsit service time reviewing records and precharting, 8 minutes face-to-face service time counseling regarding results and coordinating care, and  8 minutes charting, totalling 21 minutes.  Please don't hesitate to contact our office with any concerns or questions.  -Loly Bellamy MD    "

## 2024-04-08 ENCOUNTER — APPOINTMENT (OUTPATIENT)
Dept: LAB | Facility: CLINIC | Age: 29
End: 2024-04-08
Payer: COMMERCIAL

## 2024-04-08 ENCOUNTER — ROUTINE PRENATAL (OUTPATIENT)
Dept: OBGYN CLINIC | Facility: MEDICAL CENTER | Age: 29
End: 2024-04-08

## 2024-04-08 VITALS — WEIGHT: 198.5 LBS | DIASTOLIC BLOOD PRESSURE: 76 MMHG | BODY MASS INDEX: 34.07 KG/M2 | SYSTOLIC BLOOD PRESSURE: 120 MMHG

## 2024-04-08 DIAGNOSIS — Z34.93 THIRD TRIMESTER PREGNANCY: Primary | ICD-10-CM

## 2024-04-08 DIAGNOSIS — R03.0 ELEVATED BP WITHOUT DIAGNOSIS OF HYPERTENSION: ICD-10-CM

## 2024-04-08 DIAGNOSIS — O35.EXX0 FETAL RENAL ANOMALY, SINGLE GESTATION: ICD-10-CM

## 2024-04-08 DIAGNOSIS — F32.A DEPRESSION AFFECTING PREGNANCY IN THIRD TRIMESTER, ANTEPARTUM: ICD-10-CM

## 2024-04-08 DIAGNOSIS — O09.293 HISTORY OF PRE-ECLAMPSIA IN PRIOR PREGNANCY, CURRENTLY PREGNANT IN THIRD TRIMESTER: ICD-10-CM

## 2024-04-08 DIAGNOSIS — Z3A.38 38 WEEKS GESTATION OF PREGNANCY: ICD-10-CM

## 2024-04-08 DIAGNOSIS — Z34.93 THIRD TRIMESTER PREGNANCY: ICD-10-CM

## 2024-04-08 DIAGNOSIS — O99.210 OBESITY IN PREGNANCY: ICD-10-CM

## 2024-04-08 DIAGNOSIS — O99.343 DEPRESSION AFFECTING PREGNANCY IN THIRD TRIMESTER, ANTEPARTUM: ICD-10-CM

## 2024-04-08 LAB
ALBUMIN SERPL BCP-MCNC: 3.6 G/DL (ref 3.5–5)
ALP SERPL-CCNC: 116 U/L (ref 34–104)
ALT SERPL W P-5'-P-CCNC: 12 U/L (ref 7–52)
ANION GAP SERPL CALCULATED.3IONS-SCNC: 9 MMOL/L (ref 4–13)
AST SERPL W P-5'-P-CCNC: 15 U/L (ref 13–39)
BILIRUB SERPL-MCNC: 0.36 MG/DL (ref 0.2–1)
BUN SERPL-MCNC: 8 MG/DL (ref 5–25)
CALCIUM SERPL-MCNC: 9.2 MG/DL (ref 8.4–10.2)
CHLORIDE SERPL-SCNC: 106 MMOL/L (ref 96–108)
CO2 SERPL-SCNC: 21 MMOL/L (ref 21–32)
CREAT SERPL-MCNC: 0.59 MG/DL (ref 0.6–1.3)
CREAT UR-MCNC: 72.9 MG/DL
ERYTHROCYTE [DISTWIDTH] IN BLOOD BY AUTOMATED COUNT: 14 % (ref 11.6–15.1)
GFR SERPL CREATININE-BSD FRML MDRD: 123 ML/MIN/1.73SQ M
GLUCOSE SERPL-MCNC: 89 MG/DL (ref 65–140)
HCT VFR BLD AUTO: 40.3 % (ref 34.8–46.1)
HGB BLD-MCNC: 13.7 G/DL (ref 11.5–15.4)
MCH RBC QN AUTO: 31.6 PG (ref 26.8–34.3)
MCHC RBC AUTO-ENTMCNC: 34 G/DL (ref 31.4–37.4)
MCV RBC AUTO: 93 FL (ref 82–98)
PLATELET # BLD AUTO: 142 THOUSANDS/UL (ref 149–390)
PMV BLD AUTO: 11.8 FL (ref 8.9–12.7)
POTASSIUM SERPL-SCNC: 3.7 MMOL/L (ref 3.5–5.3)
PROT SERPL-MCNC: 6.4 G/DL (ref 6.4–8.4)
PROT UR-MCNC: 16 MG/DL
PROT/CREAT UR: 0.22 MG/G{CREAT} (ref 0–0.1)
RBC # BLD AUTO: 4.33 MILLION/UL (ref 3.81–5.12)
SODIUM SERPL-SCNC: 136 MMOL/L (ref 135–147)
WBC # BLD AUTO: 6.62 THOUSAND/UL (ref 4.31–10.16)

## 2024-04-08 PROCEDURE — PNV: Performed by: OBSTETRICS & GYNECOLOGY

## 2024-04-08 PROCEDURE — 84156 ASSAY OF PROTEIN URINE: CPT

## 2024-04-08 PROCEDURE — 85027 COMPLETE CBC AUTOMATED: CPT

## 2024-04-08 PROCEDURE — 82570 ASSAY OF URINE CREATININE: CPT

## 2024-04-08 PROCEDURE — 80053 COMPREHEN METABOLIC PANEL: CPT

## 2024-04-08 PROCEDURE — 36415 COLL VENOUS BLD VENIPUNCTURE: CPT

## 2024-04-08 NOTE — PROGRESS NOTES
Assessment  28 y.o.  at 38w0d presenting for routine prenatal visit.     Plan  Diagnoses and all orders for this visit:    Third trimester pregnancy  38 weeks gestation of pregnancy  - Labor precautions  - FKC  - IOL scheduled at 39wk; discussed may move up if BP concerns persist  - Return pp    History of pre-eclampsia in prior pregnancy, currently pregnant in third trimester  Elevated BP without diagnosis of hypertension  -     CBC and Platelet; Future  -     Comprehensive metabolic panel; Future  -     Protein / creatinine ratio, urine; Future  - Encouraged continued home BP monitoring at least daily. Notify office if persistently elevated    Obesity in pregnancy    Fetal renal anomaly, single gestation  - Follows with MFM  - Mild urinary tract dilation    Depression affecting pregnancy in third trimester, antepartum  - Continue zoloft    ____________________________________________________________        Subjective    Carolakorey Paula is a 28 y.o.  at 38w0d who presents for routine prenatal visit. She is noting home BPs increasing slightly. Occ diastolic in 90s. 138/90 this AM. Can feel when its going up. More frequent HA reported, self-limited in nature. Also occ RUQ fullness, but bulk of pregnancy favoring right side too. Denies contractions, loss of fluid, or vaginal bleeding. She feels regular fetal movements.         Pregnancy Problems:  Patient Active Problem List   Diagnosis    Hyperlipidemia    Moderate episode of recurrent major depressive disorder (HCC)    Vitamin D deficiency    Anxiety    History of pre-eclampsia in prior pregnancy, currently pregnant in third trimester    Obesity in pregnancy    Depression affecting pregnancy in third trimester, antepartum    38 weeks gestation of pregnancy    Fetal renal anomaly, single gestation    Racing heart beat         Objective  /76   Wt 90 kg (198 lb 8 oz)   LMP 2023   BMI 34.07 kg/m²     FHT: 146 BPM   Uterine Size: size equals  dates   Presentations: cephalic   Pelvic Exam:     Dilation: 1cm-2cm    Effacement: Thick    Station:  -3    Consistency: soft    Position: middle     Physical Exam:  Physical Exam  Constitutional:       General: She is not in acute distress.     Appearance: Normal appearance. She is well-developed. She is not ill-appearing, toxic-appearing or diaphoretic.   HENT:      Head: Normocephalic and atraumatic.   Eyes:      General: No scleral icterus.        Right eye: No discharge.         Left eye: No discharge.      Conjunctiva/sclera: Conjunctivae normal.   Pulmonary:      Effort: Pulmonary effort is normal. No accessory muscle usage or respiratory distress.   Abdominal:      General: There is distension (gravid).      Tenderness: There is no abdominal tenderness. There is no guarding or rebound.   Skin:     General: Skin is warm and dry.      Coloration: Skin is not jaundiced.      Findings: No bruising, erythema or rash.   Neurological:      Mental Status: She is alert.   Psychiatric:         Mood and Affect: Mood normal.         Behavior: Behavior normal.         Thought Content: Thought content normal.         Judgment: Judgment normal.

## 2024-04-14 ENCOUNTER — HOSPITAL ENCOUNTER (INPATIENT)
Facility: HOSPITAL | Age: 29
LOS: 3 days | Discharge: HOME | End: 2024-04-17
Attending: OBSTETRICS & GYNECOLOGY | Admitting: OBSTETRICS & GYNECOLOGY
Payer: COMMERCIAL

## 2024-04-14 ENCOUNTER — HOSPITAL ENCOUNTER (OUTPATIENT)
Dept: LABOR AND DELIVERY | Facility: HOSPITAL | Age: 29
Discharge: HOME | End: 2024-04-14
Payer: COMMERCIAL

## 2024-04-14 DIAGNOSIS — O13.9 GESTATIONAL HYPERTENSION, ANTEPARTUM: ICD-10-CM

## 2024-04-14 DIAGNOSIS — Z3A.38 38 WEEKS GESTATION OF PREGNANCY: ICD-10-CM

## 2024-04-14 PROBLEM — Z3A.39 39 WEEKS GESTATION OF PREGNANCY: Status: ACTIVE | Noted: 2023-10-13

## 2024-04-14 LAB
BASOPHILS # BLD AUTO: 0.02 THOUSANDS/ÂΜL (ref 0–0.1)
BASOPHILS NFR BLD AUTO: 0 % (ref 0–1)
EOSINOPHIL # BLD AUTO: 0.06 THOUSAND/ÂΜL (ref 0–0.61)
EOSINOPHIL NFR BLD AUTO: 1 % (ref 0–6)
ERYTHROCYTE [DISTWIDTH] IN BLOOD BY AUTOMATED COUNT: 13.6 % (ref 11.6–15.1)
HCT VFR BLD AUTO: 39 % (ref 34.8–46.1)
HGB BLD-MCNC: 13.2 G/DL (ref 11.5–15.4)
HOLD SPECIMEN: YES
IMM GRANULOCYTES # BLD AUTO: 0.05 THOUSAND/UL (ref 0–0.2)
IMM GRANULOCYTES NFR BLD AUTO: 1 % (ref 0–2)
LYMPHOCYTES # BLD AUTO: 1.51 THOUSANDS/ÂΜL (ref 0.6–4.47)
LYMPHOCYTES NFR BLD AUTO: 18 % (ref 14–44)
MCH RBC QN AUTO: 31.1 PG (ref 26.8–34.3)
MCHC RBC AUTO-ENTMCNC: 33.8 G/DL (ref 31.4–37.4)
MCV RBC AUTO: 92 FL (ref 82–98)
MONOCYTES # BLD AUTO: 0.92 THOUSAND/ÂΜL (ref 0.17–1.22)
MONOCYTES NFR BLD AUTO: 11 % (ref 4–12)
NEUTROPHILS # BLD AUTO: 6.06 THOUSANDS/ÂΜL (ref 1.85–7.62)
NEUTS SEG NFR BLD AUTO: 69 % (ref 43–75)
NRBC BLD AUTO-RTO: 0 /100 WBCS
PLATELET # BLD AUTO: 157 THOUSANDS/UL (ref 149–390)
PMV BLD AUTO: 11.7 FL (ref 8.9–12.7)
RBC # BLD AUTO: 4.24 MILLION/UL (ref 3.81–5.12)
WBC # BLD AUTO: 8.62 THOUSAND/UL (ref 4.31–10.16)

## 2024-04-14 PROCEDURE — 86850 RBC ANTIBODY SCREEN: CPT

## 2024-04-14 PROCEDURE — 86900 BLOOD TYPING SEROLOGIC ABO: CPT

## 2024-04-14 PROCEDURE — 80053 COMPREHEN METABOLIC PANEL: CPT

## 2024-04-14 PROCEDURE — 86780 TREPONEMA PALLIDUM: CPT

## 2024-04-14 PROCEDURE — 86901 BLOOD TYPING SEROLOGIC RH(D): CPT

## 2024-04-14 PROCEDURE — NC001 PR NO CHARGE: Performed by: OBSTETRICS & GYNECOLOGY

## 2024-04-14 PROCEDURE — 85025 COMPLETE CBC W/AUTO DIFF WBC: CPT

## 2024-04-14 RX ORDER — CALCIUM CARBONATE 500 MG/1
1000 TABLET, CHEWABLE ORAL 2 TIMES DAILY PRN
Status: DISCONTINUED | OUTPATIENT
Start: 2024-04-14 | End: 2024-04-15

## 2024-04-14 RX ORDER — BUPIVACAINE HYDROCHLORIDE 2.5 MG/ML
30 INJECTION, SOLUTION EPIDURAL; INFILTRATION; INTRACAUDAL ONCE AS NEEDED
Status: DISCONTINUED | OUTPATIENT
Start: 2024-04-14 | End: 2024-04-15

## 2024-04-14 RX ORDER — ONDANSETRON 2 MG/ML
4 INJECTION INTRAMUSCULAR; INTRAVENOUS EVERY 6 HOURS PRN
Status: DISCONTINUED | OUTPATIENT
Start: 2024-04-14 | End: 2024-04-15

## 2024-04-14 RX ORDER — ACETAMINOPHEN 325 MG/1
650 TABLET ORAL EVERY 6 HOURS PRN
Status: DISCONTINUED | OUTPATIENT
Start: 2024-04-14 | End: 2024-04-15

## 2024-04-14 RX ORDER — SODIUM CHLORIDE, SODIUM LACTATE, POTASSIUM CHLORIDE, CALCIUM CHLORIDE 600; 310; 30; 20 MG/100ML; MG/100ML; MG/100ML; MG/100ML
125 INJECTION, SOLUTION INTRAVENOUS CONTINUOUS
Status: DISCONTINUED | OUTPATIENT
Start: 2024-04-14 | End: 2024-04-15

## 2024-04-15 ENCOUNTER — ANESTHESIA EVENT (INPATIENT)
Dept: ANESTHESIOLOGY | Facility: HOSPITAL | Age: 29
End: 2024-04-15
Payer: COMMERCIAL

## 2024-04-15 ENCOUNTER — ANESTHESIA (INPATIENT)
Dept: ANESTHESIOLOGY | Facility: HOSPITAL | Age: 29
End: 2024-04-15
Payer: COMMERCIAL

## 2024-04-15 PROBLEM — R03.0 ELEVATED BLOOD PRESSURE READING WITHOUT DIAGNOSIS OF HYPERTENSION: Status: ACTIVE | Noted: 2024-04-15

## 2024-04-15 PROBLEM — O13.9 GESTATIONAL HYPERTENSION: Status: ACTIVE | Noted: 2024-04-15

## 2024-04-15 LAB
ABO GROUP BLD: NORMAL
ALBUMIN SERPL BCP-MCNC: 3.7 G/DL (ref 3.5–5)
ALP SERPL-CCNC: 107 U/L (ref 34–104)
ALT SERPL W P-5'-P-CCNC: 9 U/L (ref 7–52)
ANION GAP SERPL CALCULATED.3IONS-SCNC: 9 MMOL/L (ref 4–13)
AST SERPL W P-5'-P-CCNC: 11 U/L (ref 13–39)
BASE EXCESS BLDCOA CALC-SCNC: -7.2 MMOL/L (ref 3–11)
BASE EXCESS BLDCOV CALC-SCNC: -5.6 MMOL/L (ref 1–9)
BILIRUB SERPL-MCNC: 0.31 MG/DL (ref 0.2–1)
BLD GP AB SCN SERPL QL: NEGATIVE
BUN SERPL-MCNC: 12 MG/DL (ref 5–25)
CALCIUM SERPL-MCNC: 9.5 MG/DL (ref 8.4–10.2)
CHLORIDE SERPL-SCNC: 109 MMOL/L (ref 96–108)
CO2 SERPL-SCNC: 19 MMOL/L (ref 21–32)
CREAT SERPL-MCNC: 0.74 MG/DL (ref 0.6–1.3)
CREAT UR-MCNC: 86.5 MG/DL
GFR SERPL CREATININE-BSD FRML MDRD: 109 ML/MIN/1.73SQ M
GLUCOSE SERPL-MCNC: 84 MG/DL (ref 65–140)
HCO3 BLDCOA-SCNC: 20.3 MMOL/L (ref 17.3–27.3)
HCO3 BLDCOV-SCNC: 18.5 MMOL/L (ref 12.2–28.6)
O2 CT VFR BLDCOA CALC: 12.4 ML/DL
OXYHGB MFR BLDCOA: 57.4 %
OXYHGB MFR BLDCOV: 67.6 %
PCO2 BLDCOA: 48.2 MM[HG] (ref 30–60)
PCO2 BLDCOV: 32.4 MM HG (ref 27–43)
PH BLDCOA: 7.24 [PH] (ref 7.23–7.43)
PH BLDCOV: 7.37 [PH] (ref 7.19–7.49)
PO2 BLDCOA: 25.3 MM HG (ref 5–25)
PO2 BLDCOV: 27 MM HG (ref 15–45)
POTASSIUM SERPL-SCNC: 3.7 MMOL/L (ref 3.5–5.3)
PROT SERPL-MCNC: 6.7 G/DL (ref 6.4–8.4)
PROT UR-MCNC: 22 MG/DL
PROT/CREAT UR: 0.25 MG/G{CREAT} (ref 0–0.1)
RH BLD: POSITIVE
SAO2 % BLDCOV: 14.3 ML/DL
SODIUM SERPL-SCNC: 137 MMOL/L (ref 135–147)
SPECIMEN EXPIRATION DATE: NORMAL
TREPONEMA PALLIDUM IGG+IGM AB [PRESENCE] IN SERUM OR PLASMA BY IMMUNOASSAY: NORMAL

## 2024-04-15 PROCEDURE — 82570 ASSAY OF URINE CREATININE: CPT

## 2024-04-15 PROCEDURE — 82805 BLOOD GASES W/O2 SATURATION: CPT | Performed by: OBSTETRICS & GYNECOLOGY

## 2024-04-15 PROCEDURE — 84156 ASSAY OF PROTEIN URINE: CPT

## 2024-04-15 PROCEDURE — 59400 OBSTETRICAL CARE: CPT | Performed by: OBSTETRICS & GYNECOLOGY

## 2024-04-15 PROCEDURE — 3E0DXGC INTRODUCTION OF OTHER THERAPEUTIC SUBSTANCE INTO MOUTH AND PHARYNX, EXTERNAL APPROACH: ICD-10-PCS | Performed by: OBSTETRICS & GYNECOLOGY

## 2024-04-15 RX ORDER — CALCIUM CARBONATE 500 MG/1
1000 TABLET, CHEWABLE ORAL DAILY PRN
Status: DISCONTINUED | OUTPATIENT
Start: 2024-04-15 | End: 2024-04-17 | Stop reason: HOSPADM

## 2024-04-15 RX ORDER — ONDANSETRON 2 MG/ML
4 INJECTION INTRAMUSCULAR; INTRAVENOUS EVERY 8 HOURS PRN
Status: DISCONTINUED | OUTPATIENT
Start: 2024-04-15 | End: 2024-04-17 | Stop reason: HOSPADM

## 2024-04-15 RX ORDER — OXYTOCIN/RINGER'S LACTATE 30/500 ML
62.5 PLASTIC BAG, INJECTION (ML) INTRAVENOUS ONCE
Status: COMPLETED | OUTPATIENT
Start: 2024-04-15 | End: 2024-04-16

## 2024-04-15 RX ORDER — BENZOCAINE/MENTHOL 6 MG-10 MG
1 LOZENGE MUCOUS MEMBRANE DAILY PRN
Status: DISCONTINUED | OUTPATIENT
Start: 2024-04-15 | End: 2024-04-17 | Stop reason: HOSPADM

## 2024-04-15 RX ORDER — LIDOCAINE HYDROCHLORIDE AND EPINEPHRINE 15; 5 MG/ML; UG/ML
INJECTION, SOLUTION EPIDURAL
Status: COMPLETED | OUTPATIENT
Start: 2024-04-15 | End: 2024-04-15

## 2024-04-15 RX ORDER — POLYETHYLENE GLYCOL 3350 17 G/17G
17 POWDER, FOR SOLUTION ORAL DAILY PRN
Status: DISCONTINUED | OUTPATIENT
Start: 2024-04-15 | End: 2024-04-17 | Stop reason: HOSPADM

## 2024-04-15 RX ORDER — DIPHENHYDRAMINE HCL 25 MG
25 TABLET ORAL EVERY 6 HOURS PRN
Status: DISCONTINUED | OUTPATIENT
Start: 2024-04-15 | End: 2024-04-17 | Stop reason: HOSPADM

## 2024-04-15 RX ORDER — ACETAMINOPHEN 325 MG/1
975 TABLET ORAL EVERY 6 HOURS
Status: DISCONTINUED | OUTPATIENT
Start: 2024-04-15 | End: 2024-04-17 | Stop reason: HOSPADM

## 2024-04-15 RX ORDER — IBUPROFEN 600 MG/1
600 TABLET ORAL EVERY 6 HOURS PRN
Status: DISCONTINUED | OUTPATIENT
Start: 2024-04-16 | End: 2024-04-17 | Stop reason: HOSPADM

## 2024-04-15 RX ORDER — KETOROLAC TROMETHAMINE 30 MG/ML
30 INJECTION, SOLUTION INTRAMUSCULAR; INTRAVENOUS ONCE
Qty: 1 ML | Refills: 0 | Status: COMPLETED | OUTPATIENT
Start: 2024-04-15 | End: 2024-04-15

## 2024-04-15 RX ORDER — OXYTOCIN/RINGER'S LACTATE 30/500 ML
250 PLASTIC BAG, INJECTION (ML) INTRAVENOUS ONCE
Status: DISCONTINUED | OUTPATIENT
Start: 2024-04-15 | End: 2024-04-17 | Stop reason: HOSPADM

## 2024-04-15 RX ORDER — SIMETHICONE 80 MG
80 TABLET,CHEWABLE ORAL 4 TIMES DAILY PRN
Status: DISCONTINUED | OUTPATIENT
Start: 2024-04-15 | End: 2024-04-17 | Stop reason: HOSPADM

## 2024-04-15 RX ORDER — ROPIVACAINE HYDROCHLORIDE 2 MG/ML
INJECTION, SOLUTION EPIDURAL; INFILTRATION; PERINEURAL CONTINUOUS PRN
Status: DISCONTINUED | OUTPATIENT
Start: 2024-04-15 | End: 2024-04-15 | Stop reason: HOSPADM

## 2024-04-15 RX ORDER — TRANEXAMIC ACID 10 MG/ML
1000 INJECTION, SOLUTION INTRAVENOUS ONCE
Status: COMPLETED | OUTPATIENT
Start: 2024-04-15 | End: 2024-04-15

## 2024-04-15 RX ORDER — IBUPROFEN 600 MG/1
600 TABLET ORAL EVERY 6 HOURS
Status: DISCONTINUED | OUTPATIENT
Start: 2024-04-15 | End: 2024-04-15

## 2024-04-15 RX ORDER — ROPIVACAINE HYDROCHLORIDE 2 MG/ML
INJECTION, SOLUTION EPIDURAL; INFILTRATION; PERINEURAL AS NEEDED
Status: DISCONTINUED | OUTPATIENT
Start: 2024-04-15 | End: 2024-04-15 | Stop reason: HOSPADM

## 2024-04-15 RX ORDER — OXYTOCIN/RINGER'S LACTATE 30/500 ML
1-30 PLASTIC BAG, INJECTION (ML) INTRAVENOUS
Status: DISCONTINUED | OUTPATIENT
Start: 2024-04-15 | End: 2024-04-15

## 2024-04-15 RX ADMIN — SODIUM CHLORIDE, SODIUM LACTATE, POTASSIUM CHLORIDE, AND CALCIUM CHLORIDE 125 ML/HR: .6; .31; .03; .02 INJECTION, SOLUTION INTRAVENOUS at 07:40

## 2024-04-15 RX ADMIN — IBUPROFEN 600 MG: 600 TABLET ORAL at 17:39

## 2024-04-15 RX ADMIN — ROPIVACAINE HYDROCHLORIDE 10 ML/HR: 2 INJECTION, SOLUTION EPIDURAL; INFILTRATION at 09:52

## 2024-04-15 RX ADMIN — KETOROLAC TROMETHAMINE 30 MG: 30 INJECTION, SOLUTION INTRAMUSCULAR; INTRAVENOUS at 22:07

## 2024-04-15 RX ADMIN — Medication 2 MILLI-UNITS/MIN: at 07:32

## 2024-04-15 RX ADMIN — SODIUM CHLORIDE, SODIUM LACTATE, POTASSIUM CHLORIDE, AND CALCIUM CHLORIDE 999 ML/HR: .6; .31; .03; .02 INJECTION, SOLUTION INTRAVENOUS at 09:22

## 2024-04-15 RX ADMIN — ACETAMINOPHEN 325MG 975 MG: 325 TABLET ORAL at 17:38

## 2024-04-15 RX ADMIN — LIDOCAINE HYDROCHLORIDE AND EPINEPHRINE 3 ML: 15; 5 INJECTION, SOLUTION EPIDURAL at 09:45

## 2024-04-15 RX ADMIN — ROPIVACAINE HYDROCHLORIDE 5 ML: 2 INJECTION EPIDURAL; INFILTRATION; PERINEURAL at 09:51

## 2024-04-15 RX ADMIN — ROPIVACAINE HYDROCHLORIDE 5 ML: 2 INJECTION EPIDURAL; INFILTRATION; PERINEURAL at 09:46

## 2024-04-15 RX ADMIN — ACETAMINOPHEN 325MG 975 MG: 325 TABLET ORAL at 23:54

## 2024-04-15 RX ADMIN — Medication 50 MCG: at 02:02

## 2024-04-15 RX ADMIN — Medication 62.5 MILLI-UNITS/MIN: at 17:55

## 2024-04-15 RX ADMIN — ROPIVACAINE HYDROCHLORIDE 100 ML: 2 INJECTION, SOLUTION EPIDURAL; INFILTRATION at 09:57

## 2024-04-15 RX ADMIN — SODIUM CHLORIDE, SODIUM LACTATE, POTASSIUM CHLORIDE, AND CALCIUM CHLORIDE 125 ML/HR: .6; .31; .03; .02 INJECTION, SOLUTION INTRAVENOUS at 14:59

## 2024-04-15 RX ADMIN — TRANEXAMIC ACID 1000 MG: 10 INJECTION, SOLUTION INTRAVENOUS at 17:44

## 2024-04-15 NOTE — OB LABOR/OXYTOCIN SAFETY PROGRESS
Labor Progress Note - Carola Paula 28 y.o. female MRN: [id number]    Unit/Bed#: -01 Encounter: [id number]       Contraction Frequency (minutes): 2-3  Contraction Intensity: Mild  Uterine Activity Characteristics: Regular  Cervical Dilation: 2-3        Cervical Effacement: 60  Fetal Station: -2  Baseline Rate (FHR): 130 bpm     FHR Category: 1               Vital Signs:   Vitals:    04/15/24 0319   BP: 128/78   Pulse: 86   Resp:    Temp:    SpO2:        Notes/comments:   SVE as above. Plan to start pitocin. Dr. Pimentel aware.     Deshaun Pfeiffer DO 4/15/2024 7:21 AM

## 2024-04-15 NOTE — OB LABOR/OXYTOCIN SAFETY PROGRESS
Labor Progress Note - Carola Paula 28 y.o. female MRN: [id number]    Unit/Bed#: -01 Encounter: [id number]                Cervical Dilation: 1-2        Cervical Effacement: 30  Fetal Station: -3        FHR Category: 1               Vital Signs:   Vitals:    04/15/24 0038   BP: 104/58   Pulse: 77   Resp:    Temp:    SpO2:        Notes/comments:   IOL consent signed and reviewed. SVE as above. Plan for FB and PO Cytotec.  Dr. Loren griffin    PROCEDURE:  UMANA BALLOON PLACEMENT    A 24F umana with a 30cc balloon was selected, a speculum examination was performed and the cervix was located. A umana balloon was introduced over sterile gloved hands. Balloon advanced through cervix with a ringed forceps beyond the internal cervical os. A small amount amount of sterile saline solution was instilled in the balloon to confirm placement. Placement was confirmed to be beyond the internal cervical os. A total of 60cc of sterile saline solution was placed into the balloon. Pt tolerated well. Instructions left with RN to place umana to gravity with a 1L bag of IV fluid. Notify DO/MD when umana dislodged.    DO Deshaun Mason DO 4/15/2024 1:55 AM

## 2024-04-15 NOTE — H&P
"H & P- Obstetrics   Carola Paula 28 y.o. female MRN: [id number]  Unit/Bed#: -01 Encounter: [id number]    Assessment: 28 y.o.  at 38w6d admitted for eIOL.  SVE: 1.5/30/-3  FHT: cat 1  Clinical EFW: 86% as of 37w2d ; Cephalic confirmed by TAUS  GBS status: neg       Plan:   Elevated blood pressure reading without diagnosis of hypertension  Assessment & Plan  Systolic (24hrs), Av , Min:104 , Max:126     Diastolic (24hrs), Av, Min:58, Max:77    Isolated Elevated BP 24 130s/90s    CBC, CMP, PC ratio 0.22 on   Repeat PreE labs on admit  Asymptomatic       Fetal renal anomaly, single gestation  Assessment & Plan  \"Left sided urinary tract dilation 12.5 mm > 16.5 mm at 37 weeks. Right side no dilation. Please notify peds at delivery for  renal ultrasound\"    Moderate episode of recurrent major depressive disorder (HCC)  Assessment & Plan  Stopped taking home zoloft last month  Feels mood is good    * 38 weeks gestation of pregnancy  Assessment & Plan  Admit to OBGYN for eIOL  Clear liquid diet   F/u T&S, CBC, RPR   IVF LR 125cc/hr   Continuous fetal monitoring and tocometry   Analgesia at maternal request   Vertex by TAUS  Induction plan: ray, cytotec            Discussed case and plan w/ Dr. Pimentel      Chief Complaint: here for induction    HPI: Carola Paula is a 28 y.o.  with an JULIETTE of 2024, by Last Menstrual Period at 38w6d who is being admitted for eIOL. She denies having uterine contractions, has no LOF, and reports no VB. She states she has felt good FM.    Patient Active Problem List   Diagnosis    Hyperlipidemia    Moderate episode of recurrent major depressive disorder (HCC)    Vitamin D deficiency    Anxiety    History of pre-eclampsia in prior pregnancy, currently pregnant in third trimester    Obesity in pregnancy    Depression affecting pregnancy in third trimester, antepartum    39 weeks gestation of pregnancy    Fetal renal anomaly, single gestation    " "Racing heart beat    38 weeks gestation of pregnancy     Baby complications/comments:    MFM U/S 4/3/24  Fetus # 1 of 1  Vertex presentation  Fetal growth appeared normal  Placenta Location = Anterior  No placenta previa     MEASUREMENTS (* Included In Average GA)     AC             35.54 cm        39 weeks 3 days* (98%)  BPD             9.37 cm        38 weeks 1 day * (86%)  HC             33.21 cm        37 weeks 6 days* (39%)  Femur           7.18 cm        36 weeks 6 days* (36%)     EFW Hadlock 4   3520 grams - 7 lbs 12 oz                 (86%)     THE AVERAGE GESTATIONAL AGE is 38 weeks 1 day +/- 21 days.     AMNIOTIC FLUID     Q1: 5.7      Q2: 3.6      Q3: 4.4      Q4: 3.6  HELGA Total = 17.4 cm  Amniotic Fluid: Normal    \"1. We reviewed the overall reassuring biometry. We discussed the abdominal  circumference remains enlarged which can sometimes be seen with  gestational diabetes, however Shreya had a normal 3 hour glucola.     2. We discussed persistent left urinary tract dilation, consistent with  UTDA2-3 given >10 mm at 28 weeks. However, there are no other concerning  features such as ureteral or calyceal dilation that are notable ont he  exam today though left kidney imaging was suboptimal due to fetal  positioning. This was seen at her last ultrasound as well.  We reviewed that many cases of urinary tract dilation are still transient  however can be due to different etiologies such as obstruction or other  anatomical issues.     Follow up:  renal ultrasound is advised (at 48 hours of life,  unless clinically indicated sooner). Fetal urinary tract dilation does not  influence timing or route of delivery, or necessitate  critical  care after birth. Please notify pediatrics at delivery to ensure  appropriate follow up. \"    Review of Systems   All other systems reviewed and are negative.      OB Hx:  OB History    Para Term  AB Living   2 1 1 0 0 1   SAB IAB Ectopic Multiple " Live Births   0 0 0 0 1      # Outcome Date GA Lbr Anirudh/2nd Weight Sex Delivery Anes PTL Lv   2 Current            1 Term 03/03/21 37w0d / 01:45 3055 g (6 lb 11.8 oz) F Vag-Spont EPI N JESS       Past Medical Hx:  Past Medical History:   Diagnosis Date    Anxiety     Depression     Hypertension     Hypertension during pregnancy in third trimester 02/25/2021    Urinary tract infection     Varicella        Past Surgical hx:  Past Surgical History:   Procedure Laterality Date    APPENDECTOMY      TYMPANOSTOMY TUBE PLACEMENT Bilateral 11/2022    WISDOM TOOTH EXTRACTION         Social Hx:  Alcohol use: denies  Tobacco use: denies  Other substance use: denies    Allergies   Allergen Reactions    Milk (Cow) GI Intolerance    Pollen Extract Other (See Comments)     Post nasal drip       Medications Prior to Admission   Medication    Prenatal Vit-Fe Fumarate-FA (PRENATAL VITAMIN PO)    sertraline (ZOLOFT) 100 mg tablet    ondansetron (ZOFRAN) 4 mg tablet    Pyridoxine HCl (vitamin B-6) 25 MG tablet       Objective:  Temp:  [98.8 °F (37.1 °C)] 98.8 °F (37.1 °C)  HR:  [98] 98  Resp:  [19] 19  BP: (125)/(77) 125/77  Body mass index is 32.61 kg/m².     Physical Exam:  Physical Exam  Constitutional:       General: She is not in acute distress.     Appearance: Normal appearance. She is not ill-appearing or toxic-appearing.   Genitourinary:      Vulva normal.      Genitourinary Comments: Moderate amount of white vaginal discharge cleared with procto swab on speculum examination  No bleeding noted.       Vaginal discharge present.   HENT:      Head: Normocephalic and atraumatic.      Right Ear: External ear normal.      Left Ear: External ear normal.      Nose: Nose normal.      Mouth/Throat:      Mouth: Mucous membranes are moist.   Eyes:      General: Lids are normal.      Extraocular Movements: Extraocular movements intact.      Conjunctiva/sclera: Conjunctivae normal.   Cardiovascular:      Rate and Rhythm: Normal rate and regular  rhythm.      Pulses: Normal pulses.      Heart sounds: Normal heart sounds.   Pulmonary:      Effort: Pulmonary effort is normal. No respiratory distress.   Abdominal:      Palpations: Abdomen is soft.      Tenderness: There is no abdominal tenderness.   Musculoskeletal:      Cervical back: Full passive range of motion without pain and normal range of motion.      Right lower leg: No edema.      Left lower leg: No edema.      Right foot: Normal range of motion.      Left foot: Normal range of motion.   Neurological:      General: No focal deficit present.      Mental Status: She is alert.      Motor: No weakness.   Skin:     General: Skin is warm and dry.   Psychiatric:         Mood and Affect: Mood normal.         Judgment: Judgment normal.   Vitals reviewed. Exam conducted with a chaperone present.            FHT: 130 bpm, moderate variability, 15x15 accelerations present, no decelerations      TOCO: not present      Lab Results   Component Value Date    WBC 6.62 04/08/2024    HGB 13.7 04/08/2024    HCT 40.3 04/08/2024     (L) 04/08/2024     Lab Results   Component Value Date    K 3.7 04/08/2024     04/08/2024    CO2 21 04/08/2024    BUN 8 04/08/2024    CREATININE 0.59 (L) 04/08/2024    AST 15 04/08/2024    ALT 12 04/08/2024     Prenatal Labs: Reviewed      Blood type:  A pos  Antibody: negative  GBS: negative  HIV: non-reactive  Rubella: immune  Syphilis IgM/IgG: non-reactive  HBsAg: non-reactive  HCAb: non-reactive  Chlamydia: negative  Gonorrhea: negative  Diabetes 1 hour screen: Elevated  3 hour glucose: Normal  Platelets: 142,000 as of 4/8  Hgb: 13.7 as of 4/8  >2 Midnights  INPATIENT     Signature/Title: Deshaun Pfeiffer,   Date: 4/14/2024  Time: 10:18 PM

## 2024-04-15 NOTE — OB LABOR/OXYTOCIN SAFETY PROGRESS
Oxytocin Safety Progress Check Note - Carola Paula 28 y.o. female MRN: [id number]    Unit/Bed#: -01 Encounter: [id number]    Dose (marii-units/min) Oxytocin: 14 marii-units/min  Contraction Frequency (minutes): 1-3  Contraction Intensity: Moderate  Uterine Activity Characteristics: Regular  Cervical Dilation: 9        Cervical Effacement: 90  Fetal Station: 0  Baseline Rate (FHR): 135 bpm     FHR Category: 2               Vital Signs:   Vitals:    04/15/24 1353   BP: 127/83   Pulse: 86   Resp:    Temp:    SpO2:        Notes/comments:   SVE as above. Category II tracing due to intermittent lates. D/w Dr. Tiffany Bernal MD 4/15/2024 3:00 PM

## 2024-04-15 NOTE — ASSESSMENT & PLAN NOTE
"\"Left sided urinary tract dilation 12.5 mm > 16.5 mm at 37 weeks. Right side no dilation\"  NICU notified on admission of need for  ultrasound at 48 hours of life    "

## 2024-04-15 NOTE — L&D DELIVERY NOTE
Vaginal Delivery Summary - OB/GYN   Carola Paula 28 y.o. female MRN: [id number]  Unit/Bed#: -01 Encounter: [id number]    Pre-delivery Diagnosis:   Pregnancy at 39 weeks gestation   Gestational Hypertension  History of preeclampsia    Post-delivery Diagnosis:   Same, delivered    Procedure: Spontaneous Vaginal Delivery     Attending Physician: Dr. Urena  Resident Physician: Dr. Emely Bernal    Anesthesia: Epidural    QBL: 107 cc  Admission H.2 g/dL  Admission platelets: 157 thousands/uL    Complications: none apparent    Specimens:   1. Arterial and venous cord gases  2. Cord blood  3. Segment of umbilical cord  4. Placenta to storage     Findings:  1. Viable female on 4/15/2024 at 3:29 PM , with APGARS of 9  and 9  at 1 and 5 minutes respectively. Weight pending at time of dictation for skin to skin bonding.  2. Spontaneous delivery of intact placenta at 1532  3. No lacerations    Gases:  Umbilical Artery  Recent Labs     04/15/24  1529   PHCART 7.243   BECART -7.2*       Umbilical Vein  Recent Labs     04/15/24  1529   PHCVEN 7.374   BECVEN -5.6*         Brief history and labor course:  Carola Paula is now a 28 y.o. [id number] 39w0d who presented to labor and delivery for elective induction of labor. Her pregnancy was notable for an elevated blood pressure at 36 weeks without a diagnosis of hypertension. On exam, she was noted to be 1.5/30/-3.  She was induced with cytotec and ray balloon. She was later started on pitocin. She received an epidural and amniotomy was performed for clear fluid. She had an additional elevated blood pressure and met criteria for gestational hypertension with normal preeclampsia labs.  She progressed to complete cervical dilation and began pushing.    Description of delivery:    After pushing for 8 minutes, Carola delivered a viable female , wt pending as mother is doing skin to skin bonding. The fetal vertex delivered direct OA position spontaneously and  restituted to maternal right. There was no nuchal cord. The anterior shoulder delivered atraumatically with maternal expulsive forces and the assistance of gentle downward traction. The posterior shoulder delivered with maternal expulsive forces and the assistance of gentle upward traction. The remainder of the fetus delivered spontaneously.     Upon delivery, the infant was placed on the mothers abdomen and the cord was doubly clamped and cut. Delayed cord clamping was achieved.The infant was noted to cry spontaneously and was moving all extremities appropriately. There was no evidence for injury. Nurse resuscitators evaluating the . Arterial and venous cord blood gases and cord blood was collected for analysis. These were promptly sent to the lab. In the immediate post-partum, active management of the 3rd stage of labor was performed with massage, the administration of 30 units of IV pitocin, and gentle traction on the umbilical cord. The placenta delivered spontaneously and was noted to have a marginally inserted 3 vessel cord.  The placenta was sent to storage.    The vagina, cervix, perineum, and rectum were inspected and noted to be intact.    Bimanual exam revealed minimal clots and good uterine tone.  The fundus was firm and at the level of the umbilicus.  At the conclusion of the procedure, all needle, sponge, and instrument counts were noted to be correct. Patient tolerated the procedure well and was allowed to recover in labor and delivery room with family and  before being transferred to the post-partum floor. Dr. Urena was present and participated in all key portions of the case.    Disposition:  The patient and the  both tolerated the procedure well and are recovering in labor and delivery room       Emely Bernal MD  OB/GYN PGY-1  4/15/2024  3:45 PM

## 2024-04-15 NOTE — ASSESSMENT & PLAN NOTE
Systolic (12hrs), Av , Min:128 , Max:147   Diastolic (12hrs), Av, Min:66, Max:85  Isolated Elevated BP 24 130s/90s  CBC/CMP wnl; p:c ratio: 0.25

## 2024-04-15 NOTE — OB LABOR/OXYTOCIN SAFETY PROGRESS
Oxytocin Safety Progress Check Note - Carola Paula 28 y.o. female MRN: [id number]    Unit/Bed#: -01 Encounter: [id number]    Dose (marii-units/min) Oxytocin: 4 marii-units/min  Contraction Frequency (minutes): 3  Contraction Intensity: Mild/Moderate  Uterine Activity Characteristics: Regular  Cervical Dilation: 3-4        Cervical Effacement: 70  Fetal Station: -1  Baseline Rate (FHR): 130 bpm     FHR Category: 1               Vital Signs:   Vitals:    04/15/24 0950   BP: 127/80   Pulse: 89   Resp:    Temp:    SpO2:        Notes/comments:   SVE as above. AROM for clear fluid. Category I tracing. Continue pitocin titration. D/w Dr. Tiffany Bernal MD 4/15/2024 10:07 AM

## 2024-04-15 NOTE — OB LABOR/OXYTOCIN SAFETY PROGRESS
Oxytocin Safety Progress Check Note - Carola Paula 28 y.o. female MRN: [id number]    Unit/Bed#: -01 Encounter: [id number]       Contraction Frequency (minutes): 2-3     Uterine Activity Characteristics: Regular  Cervical Dilation: 2        Cervical Effacement: 60  Fetal Station: -2  Baseline Rate (FHR): 125 bpm     FHR Category: 1               Vital Signs:   Vitals:    04/15/24 0305   BP: 111/71   Pulse: 81   Resp:    Temp:    SpO2:        Notes/comments:   Pt reports her cervical ray fell out  She is feeling her contractions  Due for pitocin vs ray at 0600  Continue current management      Rica Pimentel MD 4/15/2024 3:48 AM

## 2024-04-15 NOTE — ANESTHESIA POSTPROCEDURE EVALUATION
Post-Op Assessment Note    CV Status:  Stable    Pain management: adequate      Post-op block assessment: catheter intact and no complications   Mental Status:  Alert and awake   Hydration Status:  Euvolemic   PONV Controlled:  Controlled   Airway Patency:  Patent     Post Op Vitals Reviewed: Yes    No anethesia notable event occurred.    Staff: Anesthesiologist               /74 (04/15/24 1551)    Temp      Pulse 85 (04/15/24 1551)   Resp      SpO2

## 2024-04-15 NOTE — OB LABOR/OXYTOCIN SAFETY PROGRESS
Oxytocin Safety Progress Check Note - Carola Paula 28 y.o. female MRN: [id number]    Unit/Bed#: -01 Encounter: [id number]    Dose (marii-units/min) Oxytocin: 14 marii-units/min  Contraction Frequency (minutes): 1-3  Contraction Intensity: Moderate  Uterine Activity Characteristics: Regular  Cervical Dilation: 5        Cervical Effacement: 90  Fetal Station: -1  Baseline Rate (FHR): 135 bpm     FHR Category: cat 1                Vital Signs:  Vitals:    04/15/24 1322   BP: 119/74   Pulse: 82   Resp:    Temp:    SpO2:        Notes/comments:     Continue current management     Edwige Urena MD 4/15/2024 1:57 PM

## 2024-04-15 NOTE — OB LABOR/OXYTOCIN SAFETY PROGRESS
Oxytocin Safety Progress Check Note - Carola Paula 28 y.o. female MRN: [id number]    Unit/Bed#: -01 Encounter: [id number]    Dose (marii-units/min) Oxytocin: 12 marii-units/min  Contraction Frequency (minutes): 2-3  Contraction Intensity: Moderate  Uterine Activity Characteristics: Regular  Cervical Dilation: 3-4        Cervical Effacement: 70  Fetal Station: -1  Baseline Rate (FHR): 135 bpm     FHR Category: 1               Vital Signs:   Vitals:    04/15/24 1226   BP: 108/70   Pulse:    Resp:    Temp: 98.4 °F (36.9 °C)   SpO2:        Notes/comments:   SVE deferred. Category I tracing. Continue pitocin titration.      Emely Bernal MD 4/15/2024 12:57 PM

## 2024-04-15 NOTE — ANESTHESIA PREPROCEDURE EVALUATION
Procedure:  LABOR ANALGESIA    Relevant Problems   ANESTHESIA (within normal limits)      CARDIO   (+) Hyperlipidemia      /RENAL   (+) Fetal renal anomaly, single gestation      GYN   (+) 38 weeks gestation of pregnancy   (+) 39 weeks gestation of pregnancy      NEURO/PSYCH   (+) Anxiety   (+) Depression affecting pregnancy in third trimester, antepartum   (+) Moderate episode of recurrent major depressive disorder (HCC)        Physical Exam    Airway    Mallampati score: II  TM Distance: >3 FB  Neck ROM: full     Dental   No notable dental hx     Cardiovascular  Rhythm: regular, Rate: normal, Cardiovascular exam normal    Pulmonary  Pulmonary exam normal Breath sounds clear to auscultation    Other Findings  post-pubertal.      Anesthesia Plan  ASA Score- 2     Anesthesia Type- epidural with ASA Monitors.         Additional Monitors:     Airway Plan:            Plan Factors-    Chart reviewed.   Existing labs reviewed. Patient summary reviewed.    Patient is not a current smoker.              Induction-     Postoperative Plan-     Informed Consent- Anesthetic plan and risks discussed with patient.

## 2024-04-15 NOTE — DISCHARGE SUMMARY
Obstetrics Discharge Summary  Carola Paula 28 y.o. female MRN: [id number]  Unit/Bed#: -01 Encounter: [id number]    Admission Date: 2024     Discharge Date: 24    Admitting Attending: Dr. Pimentel  Delivery Attending: Dr. Urena  Discharging Attending:  Dr. Grant    Admitting Diagnoses:   Pregnancy at 39 weeks gestation   Gestational Hypertension  History of preeclampsia    Discharge Diagnoses:   Same, delivered    Procedures: Spontaneous vaginal delivery    Anesthesia: epidural    Hospital course: Carola Paula is now a 28 y.o. [id number] 39w0d who presented to labor and delivery for elective induction of labor. Her pregnancy was notable for an elevated blood pressure at 36 weeks without a diagnosis of hypertension. On exam, she was noted to be 1.5/30/-3.  She was induced with cytotec and ray balloon. She was later started on pitocin. She received an epidural and amniotomy was performed for clear fluid. She had an additional elevated blood pressure and met criteria for gestational hypertension with normal preeclampsia labs.  She progressed to complete cervical dilation and began pushing.    Delivery Findings:  After pushing for 8 minutes, Carola delivered a viable female  on 4/15/2024  3:29 PM  via Vaginal, Spontaneous . The delivery was uncomplicated.  She did not sustain any lacerations.  Patient tolerated the procedure well.  was admitted to the  nursery.    Baby's Weight: 3430 g (7 lb 9 oz) ; 120.99     Apgar scores: 9  and 9  at 1 and 5 minutes, respectively  QBL: Non-Surgical QBL (mL): 100        Her post-delivery course was uncomplicated. Her postpartum pain was well controlled with oral analgesics. Maternal blood type is A positive so RhoGAM was not indicated.    On day of discharge, she was ambulating and able to reasonably perform all ADLs. She was voiding and had appropriate bowel function. Pain was well controlled. She was discharged home on postpartum day  #2 without complications. Patient was instructed to follow up with her OBGYN as an outpatient and was given appropriate warnings to call provider if she develops signs of infection or uncontrolled pain.    Complications: none apparent    Condition at discharge: Good    Disposition: Home    Planned Readmission: No    Discharge Medications:   Please see AVS for a complete list of discharge medications.    Discharge instructions :   -Do not place anything (no partner, tampons or douche) in your vagina for 6 weeks  -You may walk for exercise for the first 6 weeks then gradually return to your usual activities   -Please do not drive for 1 week if you have no stitches and for 2 weeks if you have stitches    -You may take baths or shower per your preference   -Please examine your breasts in the mirror daily and call your doctor for redness or tenderness or increased warmth    -Please call your doctor's office if temperature > 100.4*F or 38* C, worsening pain or a foul discharge.    Follow Up:  - Follow up in 1 week for blood pressure check    ZAIN Daugherty

## 2024-04-15 NOTE — OB LABOR/OXYTOCIN SAFETY PROGRESS
Oxytocin Safety Progress Check Note - Carola Paula 28 y.o. female MRN: [id number]    Unit/Bed#: -01 Encounter: [id number]    Dose (marii-units/min) Oxytocin: 14 marii-units/min  Contraction Frequency (minutes): 1-3  Contraction Intensity: Moderate  Uterine Activity Characteristics: Regular  Cervical Dilation: 10  Dilation Complete Date: 04/15/24  Dilation Complete Time: 1506  Cervical Effacement: 100  Fetal Station: 0  Baseline Rate (FHR): 135 bpm     FHR Category: 2               Vital Signs:   Vitals:    04/15/24 1353   BP: 127/83   Pulse: 86   Resp:    Temp:    SpO2:        Notes/comments:   Patient complete. Will begin to push. Anticipate . Dr. Allen aware      Emely Bernal MD 4/15/2024 3:06 PM

## 2024-04-15 NOTE — ANESTHESIA PROCEDURE NOTES
Epidural Block    Patient location during procedure: OB  Start time: 4/15/2024 9:44 AM  Reason for block: primary anesthetic  Staffing  Performed by: Luzma Conte DO  Authorized by: Luzma Conte DO    Preanesthetic Checklist  Completed: patient identified, IV checked, site marked, risks and benefits discussed, surgical consent, monitors and equipment checked, pre-op evaluation and timeout performed  Epidural  Patient position: sitting  Prep: Betadine  Sedation Level: no sedation  Patient monitoring: heart rate, frequent blood pressure checks and continuous pulse oximetry  Approach: midline  Location: lumbar, L3-4  Injection technique: CHRIS air  Needle  Needle type: Tuohy   Needle gauge: 17 G  Needle insertion depth: 6 cm  Catheter type: side hole  Catheter size: 20 G  Catheter at skin depth: 10 cm  Catheter securement method: clear occlusive dressing  Test dose: negativelidocaine-epinephrine (XYLOCAINE-MPF/EPINEPHRINE) 1.5 %-1:200,000 injection 3 mL - Epidural   3 mL - 4/15/2024 9:45:00 AM  Assessment  Number of attempts: 1negative aspiration for CSF, negative aspiration for heme and no paresthesia on injection  patient tolerated the procedure well with no immediate complications

## 2024-04-16 PROCEDURE — 99024 POSTOP FOLLOW-UP VISIT: CPT | Performed by: OBSTETRICS & GYNECOLOGY

## 2024-04-16 PROCEDURE — NC001 PR NO CHARGE: Performed by: OBSTETRICS & GYNECOLOGY

## 2024-04-16 RX ORDER — ACETAMINOPHEN 325 MG/1
975 TABLET ORAL EVERY 6 HOURS
Start: 2024-04-16

## 2024-04-16 RX ORDER — BENZOCAINE/MENTHOL 6 MG-10 MG
1 LOZENGE MUCOUS MEMBRANE DAILY PRN
Start: 2024-04-16

## 2024-04-16 RX ORDER — IBUPROFEN 600 MG/1
600 TABLET ORAL EVERY 6 HOURS PRN
Start: 2024-04-16 | End: 2024-04-16

## 2024-04-16 RX ORDER — IBUPROFEN 600 MG/1
600 TABLET ORAL EVERY 6 HOURS PRN
Qty: 30 TABLET | Refills: 0 | Status: SHIPPED | OUTPATIENT
Start: 2024-04-16

## 2024-04-16 RX ORDER — POLYETHYLENE GLYCOL 3350 17 G/17G
17 POWDER, FOR SOLUTION ORAL DAILY PRN
Start: 2024-04-16

## 2024-04-16 RX ORDER — DOCUSATE SODIUM 100 MG/1
100 CAPSULE, LIQUID FILLED ORAL 2 TIMES DAILY
Status: DISCONTINUED | OUTPATIENT
Start: 2024-04-16 | End: 2024-04-17 | Stop reason: HOSPADM

## 2024-04-16 RX ORDER — DOCUSATE SODIUM 100 MG/1
100 CAPSULE, LIQUID FILLED ORAL 2 TIMES DAILY PRN
Qty: 60 CAPSULE | Refills: 0 | Status: SHIPPED | OUTPATIENT
Start: 2024-04-16

## 2024-04-16 RX ORDER — ADHESIVE BANDAGE 3/4"
BANDAGE TOPICAL 2 TIMES DAILY
Qty: 1 EACH | Refills: 0 | Status: SHIPPED | OUTPATIENT
Start: 2024-04-16

## 2024-04-16 RX ADMIN — IBUPROFEN 600 MG: 600 TABLET, FILM COATED ORAL at 18:55

## 2024-04-16 RX ADMIN — DOCUSATE SODIUM 100 MG: 100 CAPSULE, LIQUID FILLED ORAL at 15:55

## 2024-04-16 RX ADMIN — DOCUSATE SODIUM 100 MG: 100 CAPSULE, LIQUID FILLED ORAL at 06:36

## 2024-04-16 RX ADMIN — BENZOCAINE AND LEVOMENTHOL 1 APPLICATION: 200; 5 SPRAY TOPICAL at 22:45

## 2024-04-16 RX ADMIN — ACETAMINOPHEN 325MG 975 MG: 325 TABLET ORAL at 15:54

## 2024-04-16 RX ADMIN — ACETAMINOPHEN 325MG 975 MG: 325 TABLET ORAL at 22:16

## 2024-04-16 RX ADMIN — IBUPROFEN 600 MG: 600 TABLET, FILM COATED ORAL at 04:15

## 2024-04-16 RX ADMIN — IBUPROFEN 600 MG: 600 TABLET, FILM COATED ORAL at 10:42

## 2024-04-16 NOTE — PROGRESS NOTES
"Progress Note - OB/GYN  Carola Paula 28 y.o. female MRN: [id number]  Unit/Bed#: -01 Encounter: [id number]    Assessment and Plan     Carola Paula is a patient of: [facility]. She is PPD# 1 s/p . Recovering well and is stable.     *  (spontaneous vaginal delivery)  Assessment & Plan  - Pain well controlled with oral analgesics  - Voiding spontaneously  - Tolerating PO fluids and solids  - Ambulating without difficulty  - Encourage breastfeeding and familial bonding    Gestational hypertension  Assessment & Plan  Systolic (12hrs), Av , Min:128 , Max:147   Diastolic (12hrs), Av, Min:66, Max:85  Isolated Elevated BP 24 130s/90s  CBC/CMP wnl; p:c ratio: 0.25      Fetal renal anomaly, single gestation  Assessment & Plan  \"Left sided urinary tract dilation 12.5 mm > 16.5 mm at 37 weeks. Right side no dilation\"  NICU notified on admission of need for  ultrasound at 48 hours of life      Moderate episode of recurrent major depressive disorder (HCC)  Assessment & Plan  Stopped taking home zoloft last month  Feels mood is good        Disposition    -  Anticipate discharge home on PPD# 1 - pending blood pressure control and baby clearance      Subjective/Objective     Chief Complaint: Postpartum State     Subjective:    Carola Paula is PPD#1 s/p . She has no current complaints.  Pain is well controlled with medications.  Patient is currently voiding and ambulating without difficulty.  Patient is currently passing flatus, tolerating PO diet, and denies nausea or vomitting. Patient denies fever, chills, chest pain, shortness of breath, or calf tenderness. Lochia is normal. She is Breastfeeding. She is recovering well and is stable.       Vitals:   /79 (BP Location: Right arm)   Pulse 89   Temp 97.9 °F (36.6 °C) (Oral)   Resp 16   Ht 5' 4\" (1.626 m)   Wt 86.2 kg (190 lb)   LMP 2023   SpO2 98%   Breastfeeding Unknown   BMI 32.61 kg/m²       Intake/Output " Summary (Last 24 hours) at 4/16/2024 0646  Last data filed at 4/15/2024 2301  Gross per 24 hour   Intake 1000 ml   Output 1707 ml   Net -707 ml       Invasive Devices       Peripheral Intravenous Line  Duration             Peripheral IV 04/14/24 Left Forearm 1 day                    Physical Exam:   GEN: Carola Paula appears well, alert and oriented x 3, pleasant and cooperative   CARDIO: RRR, no murmurs or rubs  RESP:  CTAB, no wheezes or rales  ABDOMEN: soft, no tenderness, no distention, fundus firm and below umbilicus  EXTREMITIES: SCDs on, non tender, no erythema    Labs:     Hemoglobin   Date Value Ref Range Status   04/14/2024 13.2 11.5 - 15.4 g/dL Final   04/08/2024 13.7 11.5 - 15.4 g/dL Final     WBC   Date Value Ref Range Status   04/14/2024 8.62 4.31 - 10.16 Thousand/uL Final   04/08/2024 6.62 4.31 - 10.16 Thousand/uL Final     Platelets   Date Value Ref Range Status   04/14/2024 157 149 - 390 Thousands/uL Final   04/08/2024 142 (L) 149 - 390 Thousands/uL Final     Creatinine   Date Value Ref Range Status   04/14/2024 0.74 0.60 - 1.30 mg/dL Final     Comment:     Standardized to IDMS reference method   04/08/2024 0.59 (L) 0.60 - 1.30 mg/dL Final     Comment:     Standardized to IDMS reference method   09/19/2018 0.70 0.40 - 1.10 mg/dL Final   05/15/2018 0.70 0.40 - 1.10 mg/dL Final     AST   Date Value Ref Range Status   04/14/2024 11 (L) 13 - 39 U/L Final   04/08/2024 15 13 - 39 U/L Final   09/19/2018 15 <41 U/L Final   05/15/2018 16 <41 U/L Final     ALT   Date Value Ref Range Status   04/14/2024 9 7 - 52 U/L Final     Comment:     Specimen collection should occur prior to Sulfasalazine administration due to the potential for falsely depressed results.    04/08/2024 12 7 - 52 U/L Final     Comment:     Specimen collection should occur prior to Sulfasalazine administration due to the potential for falsely depressed results.    09/19/2018 23 <56 U/L Final   05/15/2018 21 <56 U/L Final          Emely  MD Gabe  4/16/2024  6:46 AM

## 2024-04-16 NOTE — PROGRESS NOTES
Blood pressure monitoring program set up for pt. Verified with [facility] nurse. Pt verb understanding of use and instructions.

## 2024-04-16 NOTE — LACTATION NOTE
This note was copied from a baby's chart.  CONSULT - LACTATION  Baby Girl (Carola) Chai 1 days female MRN: [id number]    Former[facility] NURSERY Room / Bed: (N)/(N) Encounter: [id number]    Maternal Information     MOTHER:  Shreya Paula  Maternal Age: 28 y.o.   OB History: # 1 - Date: 21, Sex: Female, Weight: 3055 g (6 lb 11.8 oz), GA: 37w0d, Delivery: Vaginal, Spontaneous, Apgar1: 8, Apgar5: 9, Living: Living, Birth Comments: None    # 2 - Date: 04/15/24, Sex: Female, Weight: 3430 g (7 lb 9 oz), GA: 39w0d, Delivery: Vaginal, Spontaneous, Apgar1: 9, Apgar5: 9, Living: Living, Birth Comments: None   Previouse breast reduction surgery? No    Lactation history:   Has patient previously breast fed: Yes   How long had patient previously breast fed: 1 year with first child   Previous breast feeding complications: None     Past Surgical History:   Procedure Laterality Date    APPENDECTOMY      TYMPANOSTOMY TUBE PLACEMENT Bilateral 2022    WISDOM TOOTH EXTRACTION          Birth information:  YOB: 2026   Time of birth: 3:29 PM   Sex: female   Delivery type: Vaginal, Spontaneous   Birth Weight: 3430 g (7 lb 9 oz)   Percent of Weight Change: 0%     Gestational Age: 39w0d   [unfilled]    Assessment     Breast and nipple assessment: bilateral round large breasts with nipples facing outward.      Assessment: frequent gagging and sleepy    Feeding assessment:  Baby latched but fell asleep at breast. Some sucks an a swallow noted.   LATCH:  Latch: Repeated attempts, hold nipple in mouth, stimulate to suck   Audible Swallowing: A few with stimulation   Type of Nipple: Everted (After stimulation)   Comfort (Breast/Nipple): Soft/non-tender   Hold (Positioning): Partial assist, teach one side, mother does other, staff holds   LATCH Score: 7           24 1000   Lactation Consultation   Reason for Consult 20;20 minutes;10 min   Maternal Information   Has  mother  before? Yes   How long did the the mother previously breastfeed? 1 year with first child   Previous Maternal Breastfeeding Challenges None   Infant to breast within first hour of birth? Yes   LATCH Documentation   Latch 1   Audible Swallowing 1   Type of Nipple 2   Comfort (Breast/Nipple) 2   Hold (Positioning) 1   LATCH Score 7   Having latch problems? No   Position(s) Used Cross Cradle   Breasts/Nipples   Left Breast Soft   Right Breast Soft   Left Nipple Everted   Right Nipple Everted   Intervention Hand expression   Breastfeeding Status Yes   Breastfeeding Progress Not yet established   Breast Pump   Pump 3  (Spectra S2 and S9)   Pump Review/Education Milk storage   Patient Follow-Up   Lactation Consult Status 2   Follow-Up Type Inpatient;Call as needed   Other OB Lactation Documentation    Additional Problem Noted Mom called out for help in latching baby to breast; feels baby is very shallow at breast. Baby is very sleepy and spitting per mom. Education on proper position and alignment for deep latch. Hand expression effective for drops. Mom latched baby in cross cradle position. baby sucking with a few swallows noted. Stimulation to help keep awake during feeding. Baby fell asleep and unlatched at breast. Brought S2S. Mom is going to hand express and syringe feed to baby. Encouraged to call for next latch.  (RSB and D/C Booklets reviewed)       Feeding recommendations:  breast feed on demand  Mom called out for help in latching baby to breast; feels baby is very shallow at breast. Baby is very sleepy and spitting per mom. Education on proper position and alignment for deep latch. Hand expression effective for drops. Mom latched baby in cross cradle position. baby sucking with a few swallows noted. Stimulation to help keep awake during feeding. Baby fell asleep and unlatched at breast. Brought S2S. Mom is going to hand express and syringe feed to baby. Encouraged to call for next  latch.  Provided demonstration, education and support of deep latch to breast by placing the nipple to the nose, dragging down to chin to achieve a wide latch. Bring baby to the breast, not breast to baby. Move your shoulders down and away from your ears. Look for ear, shoulder, hip alignment. Baby's upper and lower lip should be flanged on the breast. In basket message sent for baby and Me appointment.     Met with mother. Provided mother with Ready, Set, Baby booklet which contained information on:  Hand expression with access to QR codes to review hand expression.  Positioning and latch reviewed as well as showing images of other feeding positions.  Discussed the properties of a good latch in any position.   Feeding on cue and what that means for recognizing infant's hunger, s/s that baby is getting enough milk and some s/s that breastfeeding dyad may need further help  Skin to Skin contact an benefits to mom and baby  Avoidance of pacifiers for the first month discussed.   Gave information on common concerns, what to expect the first few weeks after delivery, preparing for other caregivers, and how partners can help. Resources for support also provided.    Met with mother to go over discharge breastfeeding booklet including the feeding log. Emphasized 8 or more (12) feedings in a 24 hour period, what to expect for the number of diapers per day of life and the progression of properties of the  stooling pattern.    List of reasons to call a lactation consultant.  Feeding logs  Feeding cues  Hand expression  Baby's Second day (cluster feeding)  Breastfeeding and Your Lifestyle (Medications, Alcohol, Caffeine, Smoking, Street Drugs, Methadone)  First Two Weeks Survival Guide for Breastfeeding  Breast Changes  Physical Therapy  Storage and Handling of Breast milk  How to Keep Your Breast Pump Kit Clean  The Employed Breastfeeding Mother  Mixed feeding  Bottle feeding like breastfeeding (paced bottle  feeding)  astfeeding and your lifestyle, storage and preparation of breast milk, how to keep you breast pump clean, the employed breastfeeding mother and paced bottle feeding handouts.     Booklet included Breastfeeding Resources for after discharge including access to the number for the [facility].      Madelyn Coronado 4/16/2024 10:35 AM

## 2024-04-16 NOTE — PLAN OF CARE

## 2024-04-16 NOTE — PLAN OF CARE
Problem: POSTPARTUM  Goal: Experiences normal postpartum course  Description: INTERVENTIONS:  - Monitor maternal vital signs  - Assess uterine involution and lochia  Outcome: Progressing  Goal: Appropriate maternal -  bonding  Description: INTERVENTIONS:  - Identify family support  - Assess for appropriate maternal/infant bonding   -Encourage maternal/infant bonding opportunities  - Referral to  or  as needed  Outcome: Progressing  Goal: Establishment of infant feeding pattern  Description: INTERVENTIONS:  - Assess breast/bottle feeding  - Refer to lactation as needed  Outcome: Progressing     Problem: PAIN - ADULT  Goal: Verbalizes/displays adequate comfort level or baseline comfort level  Description: Interventions:  - Encourage patient to monitor pain and request assistance  - Assess pain using appropriate pain scale  - Administer analgesics based on type and severity of pain and evaluate response  - Implement non-pharmacological measures as appropriate and evaluate response  - Consider cultural and social influences on pain and pain management  - Notify physician/advanced practitioner if interventions unsuccessful or patient reports new pain  Outcome: Progressing     Problem: DISCHARGE PLANNING  Goal: Discharge to home or other facility with appropriate resources  Description: INTERVENTIONS:  - Identify barriers to discharge w/patient and caregiver  - Arrange for needed discharge resources and transportation as appropriate  - Identify discharge learning needs (meds, wound care, etc.)  - Arrange for interpretive services to assist at discharge as needed  - Refer to Case Management Department for coordinating discharge planning if the patient needs post-hospital services based on physician/advanced practitioner order or complex needs related to functional status, cognitive ability, or social support system  Outcome: Progressing

## 2024-04-17 ENCOUNTER — TELEPHONE (OUTPATIENT)
Dept: OTHER | Facility: OTHER | Age: 29
End: 2024-04-17

## 2024-04-17 VITALS
SYSTOLIC BLOOD PRESSURE: 127 MMHG | TEMPERATURE: 98 F | BODY MASS INDEX: 32.44 KG/M2 | OXYGEN SATURATION: 99 % | HEART RATE: 83 BPM | RESPIRATION RATE: 16 BRPM | HEIGHT: 64 IN | WEIGHT: 190 LBS | DIASTOLIC BLOOD PRESSURE: 86 MMHG

## 2024-04-17 PROCEDURE — 99024 POSTOP FOLLOW-UP VISIT: CPT | Performed by: NURSE PRACTITIONER

## 2024-04-17 RX ADMIN — DOCUSATE SODIUM 100 MG: 100 CAPSULE, LIQUID FILLED ORAL at 09:10

## 2024-04-17 RX ADMIN — IBUPROFEN 600 MG: 600 TABLET, FILM COATED ORAL at 03:17

## 2024-04-17 RX ADMIN — IBUPROFEN 600 MG: 600 TABLET, FILM COATED ORAL at 15:26

## 2024-04-17 RX ADMIN — IBUPROFEN 600 MG: 600 TABLET, FILM COATED ORAL at 09:11

## 2024-04-17 RX ADMIN — DOCUSATE SODIUM 100 MG: 100 CAPSULE, LIQUID FILLED ORAL at 17:37

## 2024-04-17 NOTE — PLAN OF CARE

## 2024-04-17 NOTE — PLAN OF CARE
Problem: POSTPARTUM  Goal: Experiences normal postpartum course  Description: INTERVENTIONS:  - Monitor maternal vital signs  - Assess uterine involution and lochia  Outcome: Completed  Goal: Appropriate maternal -  bonding  Description: INTERVENTIONS:  - Identify family support  - Assess for appropriate maternal/infant bonding   -Encourage maternal/infant bonding opportunities  - Referral to  or  as needed  Outcome: Completed  Goal: Establishment of infant feeding pattern  Description: INTERVENTIONS:  - Assess breast/bottle feeding  - Refer to lactation as needed  Outcome: Completed  Goal: Incision(s), wounds(s) or drain site(s) healing without S/S of infection  Description: INTERVENTIONS  - Assess and document dressing, incision, wound bed, drain sites and surrounding tissue  - Provide patient and family education  Outcome: Completed     Problem: PAIN - ADULT  Goal: Verbalizes/displays adequate comfort level or baseline comfort level  Description: Interventions:  - Encourage patient to monitor pain and request assistance  - Assess pain using appropriate pain scale  - Administer analgesics based on type and severity of pain and evaluate response  - Implement non-pharmacological measures as appropriate and evaluate response  - Consider cultural and social influences on pain and pain management  - Notify physician/advanced practitioner if interventions unsuccessful or patient reports new pain  Outcome: Completed     Problem: INFECTION - ADULT  Goal: Absence or prevention of progression during hospitalization  Description: INTERVENTIONS:  - Assess and monitor for signs and symptoms of infection  - Monitor lab/diagnostic results  - Monitor all insertion sites, i.e. indwelling lines, tubes, and drains  - Monitor endotracheal if appropriate and nasal secretions for changes in amount and color  - [place] appropriate cooling/warming therapies per order  - Administer medications as  ordered  - Instruct and encourage patient and family to use good hand hygiene technique  - Identify and instruct in appropriate isolation precautions for identified infection/condition  Outcome: Completed  Goal: Absence of fever/infection during neutropenic period  Description: INTERVENTIONS:  - Monitor WBC    Outcome: Completed     Problem: SAFETY ADULT  Goal: Patient will remain free of falls  Description: INTERVENTIONS:  - Educate patient/family on patient safety including physical limitations  - Instruct patient to call for assistance with activity   - Consult OT/PT to assist with strengthening/mobility   - Keep Call bell within reach  - Keep bed low and locked with side rails adjusted as appropriate  - Keep care items and personal belongings within reach  - Initiate and maintain comfort rounds  - Make Fall Risk Sign visible to staff  - Apply yellow socks and bracelet for high fall risk patients  - Consider moving patient to room near nurses station  Outcome: Completed  Goal: Maintain or return to baseline ADL function  Description: INTERVENTIONS:  -  Assess patient's ability to carry out ADLs; assess patient's baseline for ADL function and identify physical deficits which impact ability to perform ADLs (bathing, care of mouth/teeth, toileting, grooming, dressing, etc.)  - Assess/evaluate cause of self-care deficits   - Assess range of motion  - Assess patient's mobility; develop plan if impaired  - Assess patient's need for assistive devices and provide as appropriate  - Encourage maximum independence but intervene and supervise when necessary  - Involve family in performance of ADLs  - Assess for home care needs following discharge   - Consider OT consult to assist with ADL evaluation and planning for discharge  - Provide patient education as appropriate  Outcome: Completed  Goal: Maintains/Returns to pre admission functional level  Description: INTERVENTIONS:  - Perform AM-PAC 6 Click Basic Mobility/ Daily  Activity assessment daily.  - Set and communicate daily mobility goal to care team and patient/family/caregiver.   - Collaborate with rehabilitation services on mobility goals if consulted  - Out of bed for toileting  - Record patient progress and toleration of activity level   Outcome: Completed     Problem: DISCHARGE PLANNING  Goal: Discharge to home or other facility with appropriate resources  Description: INTERVENTIONS:  - Identify barriers to discharge w/patient and caregiver  - Arrange for needed discharge resources and transportation as appropriate  - Identify discharge learning needs (meds, wound care, etc.)  - Arrange for interpretive services to assist at discharge as needed  - Refer to Case Management Department for coordinating discharge planning if the patient needs post-hospital services based on physician/advanced practitioner order or complex needs related to functional status, cognitive ability, or social support system  Outcome: Completed     Problem: Knowledge Deficit  Goal: Patient/family/caregiver demonstrates understanding of disease process, treatment plan, medications, and discharge instructions  Description: Complete learning assessment and assess knowledge base.  Interventions:  - Provide teaching at level of understanding  - Provide teaching via preferred learning methods  Outcome: Completed

## 2024-04-17 NOTE — PROGRESS NOTES
Progress Note - OB/GYN  Post-Partum Physician Note   Carola Paula 28 y.o. female MRN: [id number]  Unit/Bed#: -01 Encounter: [id number]    Patient is post-partum day 2 from a Spontaneous vaginal delivery.     Brief OB review:  Admitted @ 39 weeks for eIOL. Pregnancy complicated by elevated BP without diagnosis of HTN, history of preeclampsia, depression and fetal renal anomaly ( left sided urinary tract dilation). Plans for  US. Labor and delivery by meeting criteria for gHTN and delivery over intact perineum. Immediate PP course relatively uncomplicated, had elevated BPs 140/80.  Last elevation 143/89 24 @ 2300. Is enrolled in OP BP monitoring program. Has BP cuff in room.       Pain: no  Tolerating Oral Intake: yes  Voiding: yes  Flatus: yes  Bowel Movement: no  Ambulating: yes  Breastfeeding: Breastfeeding  Chest Pain: no  Shortness of Breath: no  Leg Pain/Discomfort: no  Lochia: moderate  Other: bonding well     Objective:   Vitals:    24 0700   BP: 123/82   Pulse: 68   Resp: 18   Temp: 98.4 °F (36.9 °C)   SpO2:      No intake or output data in the 24 hours ending 24 0818    Physical Exam:  General: in no apparent distress and well developed and well nourished  Cardiovascular: Cor RRR  Lungs: clear to auscultation bilaterally  Abdomen: abdomen is soft without significant tenderness, masses, organomegaly or guarding  Fundus: Firm and appropriately tender to palpation, 1 below the umbilicus  Lower extremeties: nontender; + 1 edema    Labs/Tests:   Admission H/H  24 13.2 39      MEDS:   Current Facility-Administered Medications   Medication Dose Route Frequency    acetaminophen (TYLENOL) tablet 975 mg  975 mg Oral Q6H    benzocaine-menthol-lanolin-aloe (DERMOPLAST) 20-0.5 % topical spray 1 Application  1 Application Topical Q6H PRN    calcium carbonate (TUMS) chewable tablet 1,000 mg  1,000 mg Oral Daily PRN    diphenhydrAMINE (BENADRYL) tablet 25 mg  25 mg Oral Q6H PRN     docusate sodium (COLACE) capsule 100 mg  100 mg Oral BID    hydrocortisone 1 % cream 1 Application  1 Application Topical Daily PRN    ibuprofen (MOTRIN) tablet 600 mg  600 mg Oral Q6H PRN    ondansetron (ZOFRAN) injection 4 mg  4 mg Intravenous Q8H PRN    oxytocin (PITOCIN) 30 Units in lactated ringers 500 mL infusion  250 marii-units/min Intravenous Once    polyethylene glycol (MIRALAX) packet 17 g  17 g Oral Daily PRN    simethicone (MYLICON) chewable tablet 80 mg  80 mg Oral 4x Daily PRN    witch hazel-glycerin (TUCKS) topical pad 1 Pad  1 Pad Topical Q4H PRN     Invasive Devices       Peripheral Intravenous Line  Duration             Peripheral IV 24 Left Forearm 2 days                    Assessment and Plan:  28 y.o. year-old , postpartum day 2 status-post  spontaneous vaginal  delivery    Continue routine postpartum care  Encourage ambulation  Advance diet as tolerated  Lactation support   Reviewed OP BP monitoring program. Has BP cuff in room   Anticipate DC today     ZAIN Daugherty

## 2024-04-17 NOTE — PLAN OF CARE
Problem: POSTPARTUM  Goal: Experiences normal postpartum course  Description: INTERVENTIONS:  - Monitor maternal vital signs  - Assess uterine involution and lochia  Outcome: Progressing  Goal: Appropriate maternal -  bonding  Description: INTERVENTIONS:  - Identify family support  - Assess for appropriate maternal/infant bonding   -Encourage maternal/infant bonding opportunities  - Referral to  or  as needed  Outcome: Progressing  Goal: Establishment of infant feeding pattern  Description: INTERVENTIONS:  - Assess breast/bottle feeding  - Refer to lactation as needed  Outcome: Progressing     Problem: PAIN - ADULT  Goal: Verbalizes/displays adequate comfort level or baseline comfort level  Description: Interventions:  - Encourage patient to monitor pain and request assistance  - Assess pain using appropriate pain scale  - Administer analgesics based on type and severity of pain and evaluate response  - Implement non-pharmacological measures as appropriate and evaluate response  - Consider cultural and social influences on pain and pain management  - Notify physician/advanced practitioner if interventions unsuccessful or patient reports new pain  Outcome: Progressing     Problem: DISCHARGE PLANNING  Goal: Discharge to home or other facility with appropriate resources  Description: INTERVENTIONS:  - Identify barriers to discharge w/patient and caregiver  - Arrange for needed discharge resources and transportation as appropriate  - Identify discharge learning needs (meds, wound care, etc.)  - Arrange for interpretive services to assist at discharge as needed  - Refer to Case Management Department for coordinating discharge planning if the patient needs post-hospital services based on physician/advanced practitioner order or complex needs related to functional status, cognitive ability, or social support system  Outcome: Progressing     Problem: Knowledge Deficit  Goal:  Patient/family/caregiver demonstrates understanding of disease process, treatment plan, medications, and discharge instructions  Description: Complete learning assessment and assess knowledge base.  Interventions:  - Provide teaching at level of understanding  - Provide teaching via preferred learning methods  Outcome: Progressing

## 2024-04-17 NOTE — DISCHARGE INSTR - AVS FIRST PAGE
Call OB office for 3 week post delivery appointment       -Do not place anything (no partner, tampons or douche) in your vagina for 6 weeks  -You may walk for exercise for the first 6 weeks then gradually return to your usual activities   -Please do not drive for 1 week if you have no stitches and for 2 weeks if you have stitches    -You may take baths or shower per your preference   -Please examine your breasts in the mirror daily and call your doctor for redness or tenderness or increased warmth    -Please call your doctor's office if temperature > 100.4*F or 38* C, worsening pain or a foul discharge.

## 2024-04-17 NOTE — LACTATION NOTE
This note was copied from a baby's chart.  Met with Shreya who is for discharge today with her baby girl, pending baby's kidney ultrasound to be done later today.     Latch assessment was done and Shreya was able to latch her baby at the breast independently with minimal verbal positioning assistance.     Positioning and latch were reviewed:    Position her baby up at chest level (using pillow support).   Stressed importance of good alignment ( baby's ear, shoulder and hip in a straight line ).   Bring baby to breast and not breast to baby ( no hunching over).   Align nipple to nose and stroke nipple down to chin to achieve a wide open mouth.   Baby's lower lip and chin touching the breast with nipple aimed up towards the roof of baby's mouth  with areolar compression to achieve a deep latch that is comfortable and exchanges optimum amounts of colostrum.     Also went over the Breastfeeding Log emphasizing the importance of 8-12 feedings in a 24 hour period and monitoring the baby's output.( number of stools and wets for a 24 hour period).     Discussed s/s engorgement, blocked milk ducts, and mastitis. Discussed how to remedy at home and when to contact physician.    Storage and preparation of breast milk and when to start pumping reviewed    Encouraged Shreya to utilize the [facility] for follow up breastfeeding  support as needed.    Encouraged Shreya to call with additional questions or for breastfeeding assistance prior to discharge today.

## 2024-04-18 ENCOUNTER — TRANSITIONAL CARE MANAGEMENT (OUTPATIENT)
Dept: FAMILY MEDICINE CLINIC | Facility: CLINIC | Age: 29
End: 2024-04-18

## 2024-04-18 NOTE — TELEPHONE ENCOUNTER
Pt is calling to schedule post partum appointment please f/u. Per pt appointment has to be a week from today so the 04/24/ .Thank you.

## 2024-04-23 ENCOUNTER — CLINICAL SUPPORT (OUTPATIENT)
Dept: OBGYN CLINIC | Facility: MEDICAL CENTER | Age: 29
End: 2024-04-23

## 2024-04-23 VITALS — SYSTOLIC BLOOD PRESSURE: 128 MMHG | DIASTOLIC BLOOD PRESSURE: 76 MMHG

## 2024-04-23 DIAGNOSIS — R03.0 ELEVATED BP WITHOUT DIAGNOSIS OF HYPERTENSION: Primary | ICD-10-CM

## 2024-04-23 LAB — PLACENTA IN STORAGE: NORMAL

## 2024-04-26 ENCOUNTER — OFFICE VISIT (OUTPATIENT)
Dept: POSTPARTUM | Facility: CLINIC | Age: 29
End: 2024-04-26
Payer: COMMERCIAL

## 2024-04-26 VITALS — HEART RATE: 64 BPM | SYSTOLIC BLOOD PRESSURE: 122 MMHG | DIASTOLIC BLOOD PRESSURE: 80 MMHG

## 2024-04-26 PROCEDURE — 99404 PREV MED CNSL INDIV APPRX 60: CPT | Performed by: PEDIATRICS

## 2024-04-26 NOTE — PROGRESS NOTES
"INITIAL BREAST FEEDING EVALUATION    Informant/Relationship: Shreya    Discussion of General Lactation Issues:   Shreya feels that Gelacio cannot get a good deep latch and that she does not open wide enough.  Shreya has initial latch on pain on both breasts , she reports cracks on both nipples.  On the right breast she feels pain under her arm \"like something is pulling\"  with the initial latch on.  She feels that Gelacio feeds well and is getting enough though she is often sleepy at the breast, night time feedings take about 30-45 minutes sometimes.      Infant is 11 days old today.        History:  Fertility Problem:no  Breast changes:yes - larger breast, darker areolas  : yes - elective IOL, Gestation HTN  Full term:yes - 39   labor:no  First nursing/attempt < 1 hour after birth:yes - fed well  Skin to skin following delivery:yes - immediatly for about  1hr  Breast changes after delivery:yes - Reddy, milk in day 4-5  Rooming in (infant in room with mother with exception of procedures, eg. Circumcision: yes - yes  Blood sugar issues:no  NICU stay:no  Jaundice:yes - slightly elevated  Phototherapy:no  Supplement given: (list supplement and method used as well as reason(s):no    Past Medical History:   Diagnosis Date    Anxiety     Depression     Hypertension     Hypertension during pregnancy in third trimester 2021    Urinary tract infection     Varicella          Current Outpatient Medications:     acetaminophen (TYLENOL) 325 mg tablet, Take 3 tablets (975 mg total) by mouth every 6 (six) hours, Disp: , Rfl:     benzocaine-menthol-lanolin-aloe (DERMOPLAST) 20-0.5 % topical spray, Apply 1 Application topically 4 (four) times a day as needed for mild pain or irritation, Disp: 56 g, Rfl: 0    Blood Pressure Monitoring (Blood Pressure Cuff) MISC, Use 2 (two) times a day, Disp: 1 each, Rfl: 0    docusate sodium (COLACE) 100 mg capsule, Take 1 capsule (100 mg total) by mouth 2 (two) times a day as " needed for constipation, Disp: 60 capsule, Rfl: 0    hydrocortisone 1 % cream, Apply 1 Application topically daily as needed for irritation (Patient not taking: Reported on 4/23/2024), Disp: , Rfl:     ibuprofen (MOTRIN) 600 mg tablet, Take 1 tablet (600 mg total) by mouth every 6 (six) hours as needed for mild pain, Disp: 30 tablet, Rfl: 0    polyethylene glycol (MIRALAX) 17 g packet, Take 17 g by mouth daily as needed (constipation), Disp: , Rfl:     Prenatal Vit-Fe Fumarate-FA (PRENATAL VITAMIN PO), Take by mouth, Disp: , Rfl:     sertraline (ZOLOFT) 100 mg tablet, Take 1 tablet (100 mg total) by mouth daily, Disp: 90 tablet, Rfl: 0    witch hazel-glycerin (TUCKS) topical pad, Apply 1 Pad topically every 4 (four) hours as needed for irritation, Disp: 50 each, Rfl: 0    Allergies   Allergen Reactions    Milk (Cow) GI Intolerance    Pollen Extract Other (See Comments)     Post nasal drip       Social History     Substance and Sexual Activity   Drug Use Yes    Types: Marijuana    Comment: medical card- last used in June in 2023       Social History     Interval Breastfeeding History:    Frequency of breast feeding: q3hrs ATC  Does mother feel breastfeeding is effective: Yes  Does infant appear satisfied after nursing:Yes  Stooling pattern normal: Yes  Urinating frequently:Yes  Using shield or shells: No    Alternative/Artificial Feedings:   Bottle: No  Cup: No  Syringe/Finger: No           Formula Type: none                     Amount: none            Breast Milk:                      Amount: N/A        Elimination Problems: No      Equipment:    Pump            Type: [employer] Go, Spectra S1, Spectra S9 (new for this pregnancy) and a manual pump            Frequency of Use: initially pumped on the day her milk came in for relief       Equipment Problems: no    Mom:  Breast: medium to large in size, round, close spaced  Nipple Assessment in General: Normal: elongated/eraser, no discoloration.  Cracks on the nipple  shaft near the base of the nipple.   Mother's Awareness of Feeding Cues                 Recognizes: Yes                  Verbalizes: Yes  Support System: FOB, extended family   History of Breastfeeding: Breastfeed her 2y/o for 1 year, for the last 2 months of that journey she exclusive pumped due to painful milk blebs  Changes/Stressors/Violence: safe at home, none   Concerns/Goals: to continue to breastfeed without pain and to be sure Gelacio is well fed.    Problems with Mom: nipple damage latch on pain    Physical Exam  Constitutional:       Appearance: Normal appearance.   HENT:      Head: Normocephalic and atraumatic.   Cardiovascular:      Rate and Rhythm: Normal rate and regular rhythm.      Pulses: Normal pulses.      Heart sounds: Normal heart sounds.   Pulmonary:      Effort: Pulmonary effort is normal.      Breath sounds: Normal breath sounds.   Musculoskeletal:         General: Normal range of motion.      Cervical back: Normal range of motion.   Neurological:      General: No focal deficit present.      Mental Status: She is alert and oriented to person, place, and time.   Skin:     General: Skin is warm and dry.   Psychiatric:         Mood and Affect: Mood normal.         Behavior: Behavior normal.         Thought Content: Thought content normal.         Judgment: Judgment normal.         Infant:  Behaviors: Alert  Color: Pink and slightly jaundice   Birth weight: 3430g  Current weight: 3385g    Problems with infant: sleepy at the breast, shallow to moderate latch, possible tongue restriction.       General Appearance:  Alert, active, no distress                            Head:  Normocephalic, AFOF, sutures opposed                            Eyes:   Conjunctiva clear, no drainage                            Ears:   Normally placed, no anomolies                           Nose:   Septum intact, no drainage or erythema                          Mouth:  No lesions.  Suck blister on center of upper lip. A  slight cleft is noted in the tongue tip, lateralization is complete, tip of tongue extends to lower lip, complete peristalsis with firm cup, no snap back.  Lingual frenulum attaches just below lower alveolar ridge and posterior to tongue tip, it is thick with moderate elasticity and about 1cm lift.                    Neck:  Supple, symmetrical                Respiratory:  No grunting, flaring, retractions, breath sounds clear and equal           Cardiovascular:  Regular rate and rhythm. No murmur. Adequate perfusion/capillary refill. Femoral pulse present                  Abdomen:    Soft, non-tender, no masses, bowel sounds present            Genitourinary:  Normal female genitalia, anus patent                         Spine:   No abnormalities noted       Musculoskeletal:   Full range of motion         Skin/Hair/Nails:   Skin warm, dry, and intact, no rashes or abnormal dyspigmentation or lesions               Neurologic:   No abnormal movement, tone appropriate for gestational age    [place] Latch:  Efficiency:               Lips Flanged: Yes              Depth of latch: shallow to moderate               Audible Swallow: Yes, aided by breast compressions               Visible Milk: Yes              Wide Open/ Asymmetrical: Yes              Suck Swallow Cycle: Breathing: unlabored, Coordinated: yes  Nipple Assessment after latch: slanted, small white dot on the left nipple face  Latch Problems: Gelacio is sleepy at the breast, sucks and swallows noted aided at times by breast compressions     Position:  Infant's Ergonomics/Body               Body Alignment: Yes               Head Supported: Yes               Close to Mom's body/ Lifted/ Supported: Yes               Mom's Ergonomics/Body: Yes                           Supported: Yes                           Sitting Back: Yes                           Brings Baby to her breast: Yes  Positioning Problems: none      Education:  Reviewed Latch and positioning: Worked  "on positioning infant up at chest level and starting to feed infant with nose arriving at the nipple. Then, using areolar compression to achieve a deep latch that is comfortable and exchanges optimum amounts of milk. I offered suggestions on positioning, for a more optimal latch, showed mom proper positioning, ear, shoulder hip in line, baby's arms open, not in between mom and baby, nose to nipple, hand at base of head/neck.How to break a latch with clean finger.  How to differentiate between nutritive and non-nutritive suck. When baby slows at the breast you may offer gentle breast compressions to increase flow.  Offer both breasts with each feeding.  You may offer up to 4 breasts per feeding, or \"switch\" nursing: latch baby, when suckling slows offer gentle breast compressions, once no longer actively nursing on that breast burp to stimulate, then switch to the other side, repeat up to 2 more times as needed.     Reviewed Frequency/Supply & Demand: Continue to feed Brynlee on demand at least q3Hrs around the clock  Reviewed Infant:Cues and varied States of Awareness  Reviewed Infant Elimination: yes  Reviewed Mom/Breast care: Reviewed moist would healing for nipple damage.  Apply organic coconut or olive oil, or lanolin/nipple cream to the nipple and cover with a small piece of wax/parcment paper.  Remove before feeding and apply new cream and paper after each feeding. Discussed appropriate handling and care of the breast to maintain their integrity.        Plan:  Continue to feed Brynlee on demand at least q3hrs around the clock working on optimal latch and positioning, offering breast compressions and switch nursing as needed.    Apply moist wound healing for nipple damage.     Follow up with family doctor and/or dermatology in regard to mole on the right breast.    Follow up with lactation in 1 week.     I have spent 75  minutes with Patient and family today in which greater than 50% of this time was spent in " counseling/coordination of care regarding Patient and family education.

## 2024-04-26 NOTE — PATIENT INSTRUCTIONS
"Continue to feed on demand (at least every 3 hours around the clock) working on achieving an optimal latch by positioning baby with ear, shoulder and hip in line, baby's arms open, not across chest/ in between mom and baby.  Starting with nose to nipple, hand at base if baby's head/neck infant up at chest level and starting to feed infant with nose arriving at the nipple. Then, using areolar compression to achieve a deep latch that is comfortable and exchanges optimum amounts of milk.      When baby slows at the breast you may offer gentle breast compressions to increase flow.  Offer both breasts with each feeding.  You may offer up to 4 breasts per feeding, or \"switch\" nursing: latch baby, when suckling slows offer gentle breast compressions, once no longer actively nursing on that breast burp to stimulate, then switch to the other side, repeat up to 2 more times as needed.       You may apply moist would healing for nipple damage.  Apply organic coconut or olive oil, or lanolin/nipple cream to the nipple and cover with a small piece of wax/parcment paper.  Remove before feeding and apply new cream and paper after each feeding.    Follow up with family doctor and/or dermatology in regards to mole on Carola's right breast.    Follow up with lactation in 1 week.    Call with any questions or concerns.          "

## 2024-05-01 ENCOUNTER — TELEPHONE (OUTPATIENT)
Dept: FAMILY MEDICINE CLINIC | Facility: CLINIC | Age: 29
End: 2024-05-01

## 2024-05-01 DIAGNOSIS — D22.9 ATYPICAL MOLE: Primary | ICD-10-CM

## 2024-05-01 NOTE — TELEPHONE ENCOUNTER
"----- Message from Cate Alvarez sent at 4/30/2024  9:16 AM EDT -----  Regarding: FW: Referral?   Contact: [phone]    ----- Message -----  From: Mendy Santamaria RN  Sent: 4/30/2024   9:12 AM EDT  To: [facility]  Subject: FW: Referral?                                      ----- Message -----  From: Carola Paula \"Shreya\"  Sent: 4/29/2024   5:54 PM EDT  To: [facility]  Subject: Referral?                                        Hello, I have a mole on my chest that lactation told me I should get checked. Wasn’t sure if I should make an appointment with my primary or if I could get a referral to dermatology?       "

## 2024-05-06 ENCOUNTER — TELEPHONE (OUTPATIENT)
Age: 29
End: 2024-05-06

## 2024-05-06 ENCOUNTER — OFFICE VISIT (OUTPATIENT)
Dept: POSTPARTUM | Facility: CLINIC | Age: 29
End: 2024-05-06
Payer: COMMERCIAL

## 2024-05-06 DIAGNOSIS — O92.79 PAIN AGGRAVATED BY BREAST FEEDING: ICD-10-CM

## 2024-05-06 DIAGNOSIS — R52 PAIN AGGRAVATED BY BREAST FEEDING: ICD-10-CM

## 2024-05-06 PROCEDURE — 99404 PREV MED CNSL INDIV APPRX 60: CPT | Performed by: PEDIATRICS

## 2024-05-06 NOTE — PATIENT INSTRUCTIONS
Continue to nurse Gelacio on demand. Gently compress the breast and align the baby so that her nose is just above the nipple with her lower lip and chin touching the breast to encourage the deepest, widest, off-center latch.   Pump if a feeding at the breast is missed and as desired to obtain milk for bottle feeding.  Avoid expressing a large surplus of milk.  Please call for a follow up appointment if you continue to have pain with attachment, if you develop any issues with breast or nipple health or concerns with milk production or with any other breastfeeding questions or concerns.

## 2024-05-06 NOTE — PROGRESS NOTES
"BREAST FEEDING FOLLOW UP VISIT    Informant/Relationship: Shreya    Discussion of General Lactation Issues: Adolfo feels things are getting better. Her nipples have healed.  Latch is still painful but only briefly.  Gelacio is more alert now but is still a \"sleepy\" feeder.     Infant is 2 weeks old today.    Interval Breastfeeding History:  Frequency of breast feeding: Every 3 hours on demand.  Does mother feel breastfeeding is effective: Yes  Does infant appear satisfied after nursing:Yes  Stooling pattern normal:Yes  Urinating frequently:Yes  Using shield or shells:No    Alternative/Artificial Feedings:   Bottle: Yes, twice so far.  Uses a Dr Mary's bottle with paced feeding technique.  Cup: No  Syringe/Finger: No           Formula Type: none                     Amount: n/a            Breast Milk:                      Amount: 2.5 ounces            Frequency Q 3 Hr between feedings  Elimination Problems: No      Equipment:  Nipple Shield             Type: none             Size: n/a             Frequency of Use: n/a  Pump            Type: [place] Go, Spectra S1, Spectra S9 (new for this pregnancy) and a manual pump             Frequency of Use: once to relieve engorgement and once to obtain milk from a bottle  Shells            Type: none            Frequency of use: n/a    Equipment Problems: no      Mom:  Breast: Normal  Nipple Assessment in General: Normal: elongated/eraser, no discoloration and no damage noted.  Mother's Awareness of Feeding Cues                 Recognizes: Yes                  Verbalizes: Yes  Support System: FOB, extended family  History of Breastfeeding: Breastfeed her 2y/o for 1 year, for the last 2 months of that journey she exclusive pumped due to painful milk blebs   Changes/Stressors/Violence: Shreya is feeling reassured that breastfeeding has improved.   Concerns/Goals: Shreya desires to breastfeed without pain and to be sure Gelacio is well fed    Problems with Mom: attachment associated " "pain    Physical Exam  Constitutional:       Appearance: Normal appearance.   HENT:      Head: Normocephalic.   Pulmonary:      Effort: Pulmonary effort is normal.   Musculoskeletal:         General: Normal range of motion.   Neurological:      Mental Status: She is alert and oriented to person, place, and time.   Skin:     General: Skin is warm and dry.   Psychiatric:         Mood and Affect: Mood normal.         Behavior: Behavior normal.         Thought Content: Thought content normal.         Judgment: Judgment normal.         Infant:  Behaviors: Alert  Color: Pink  Birth weight: 3430 grams  Current weight: 3720 grams    Problems with infant: shallow latch, \"sleepy\" feeder      General Appearance:  Alert, active, no distress                            Head:  Normocephalic, AFOF, sutures opposed                            Eyes:   Conjunctiva clear, no drainage                            Ears:   Normally placed, no anomolies                           Nose:   No drainage or erythema                          Mouth:  No lesions. Tongue extends just barely to the lower lip, lateralizes well.  Good cupping of my finger felt as she sucked and occasionally, effective peristalsis was felt.  Frequently, the back of the tongue just bunched up to squeeze the tip of my tongue against the palate.  Very little suction was generated and my finger pulled easily from her mouth without much resistance. There is a subtle divet in the tip of the tongue with movement. The lingual frenulum attaches posterior to the tip of the tongue and along the base of the lower alveolar ridge.                   Neck:  Supple, symmetrical, trachea midline                Respiratory:  No grunting, flaring, retractions, breath sounds clear and equal           Cardiovascular:  Regular rate and rhythm. No murmur. Adequate perfusion/capillary refill. Femoral pulse present                  Abdomen:    Soft, non-tender, no masses, bowel sounds present, no " HSM            Genitourinary:  Normal female genitalia, anus patent                         Spine:   No abnormalities noted       Musculoskeletal:   Full range of motion         Skin/Hair/Nails:   Skin warm, dry, and intact, no rashes or abnormal dyspigmentation or lesions               Neurologic:   No abnormal movement, tone appropriate for gestational age     Latch:  Efficiency:               Lips Flanged: Yes              Depth of latch: fair, narrow gape observed              Audible Swallow: Yes, but no sustained SSB.  Suckling bursts were short with frequent, long pauses between              Visible Milk: Yes              Wide Open/ Asymmetrical: asymmetrical but now wide              Suck Swallow Cycle: Breathing: unlabored, Coordinated: yes, almost the entire time.  Coughed and popped off once  Nipple Assessment after latch: lip stick shaped  Latch Problems: First latch was shallow.  Latch after repositioning was wider/deeper.  Micheal did not establish a sustained SSB at any point during the feeding.  Suckling bursts were short with frequent, long breaks between    Position:  Infant's Ergonomics/Body               Body Alignment: Yes               Head Supported: Yes               Close to Mom's body/ Lifted/ Supported: Yes               Mom's Ergonomics/Body: Yes                           Supported: Yes                           Sitting Back: Yes                           Brings Baby to her breast: Yes, after review  Positioning Problems: Shreya positioned Gelacio effectively in cross cradle hold with a pillow for support. I reminded her to position the baby in front of her body so she does not have to twist her spine to bring her breast to the baby and to bring the baby onto her breast rather than pushing her nipple down into the baby's mouth      Handouts:   None    Education:  Reviewed Latch: Demonstrated how to gently compress the breast and align the baby so that her nose is just above the  "nipple with her lower lip and chin touching the breast to encourage the deepest, widest, off-center latch.   Reviewed Positioning for Dyad: Demonstrated how to position mom comfortably and supported with her baby belly to belly prior to bringing her baby to her breast.  Reviewed Mom/Breast care: Discussed appropriate handling of the breasts to avoid inflammation pain and injury.        Plan:    Reassurance and support given.  I acknowledged Shreya's concerns and her commitment to breastfeeding.  I encouraged her to continue to feed Brynlee on demand.  I reviewed positioning for a deep latch.  We discussed pumping to begin to build a modest \"freezer stash\" as Shreya will be returning to work next month.  I encouraged Shreya to call for additional evaluation and support if her remaining pain does not resolve quickly, if she develops issues with breast or nipple health or if she feels her milk production is dropping.    I encouraged her to call with any questions or concerns.    I have spent 60 minutes with Patient and family today in which greater than 50% of this time was spent in counseling/coordination of care regarding Instructions for management and Patient and family education.                                                                             "

## 2024-05-07 ENCOUNTER — POSTPARTUM VISIT (OUTPATIENT)
Dept: OBGYN CLINIC | Facility: MEDICAL CENTER | Age: 29
End: 2024-05-07

## 2024-05-07 VITALS
HEIGHT: 64 IN | BODY MASS INDEX: 30.9 KG/M2 | DIASTOLIC BLOOD PRESSURE: 68 MMHG | WEIGHT: 181 LBS | SYSTOLIC BLOOD PRESSURE: 102 MMHG

## 2024-05-07 PROCEDURE — 99024 POSTOP FOLLOW-UP VISIT: CPT | Performed by: OBSTETRICS & GYNECOLOGY

## 2024-05-07 NOTE — PROGRESS NOTES
"Subjective     Carola Paula is a 28 y.o. female who presents for a postpartum visit. She is 3 weeks postpartum following a spontaneous vaginal delivery. I have fully reviewed the prenatal and intrapartum course. The delivery was at term gestational weeks. Outcome: spontaneous vaginal delivery. . Postpartum course has been coplicated by BP elevated  but well controlled  . Baby's course has been uncomplicated  . Baby is feeding by breast. Bleeding thin lochia. Bowel function is normal. Bladder function is normal. Patient is not sexually active. Contraception method is none. Postpartum depression screening: negative.    The following portions of the patient's history were reviewed and updated as appropriate: allergies, current medications, past family history, past medical history, past social history, past surgical history, and problem list.    Review of Systems  Pertinent items are noted in HPI..    Objective     /68   Ht 5' 4\" (1.626 m)   Wt 82.1 kg (181 lb)   LMP 07/17/2023   Breastfeeding Yes   BMI 31.07 kg/m²    General:  alert and oriented, in no acute distress    Breasts:  deferred   Lungs: Unlabored breathing     Abdomen: soft, non-tender; bowel sounds normal; no masses,  no organomegaly     Assessment/Plan     Normal  postpartum exam. Pap smear not done at today's visit.    1. Contraception: none-  getting vasectomy    2. GHTN  - no problems and normotensive today   3. Follow up in: 4 months for annual or as needed.  Still having light  lochia  - we discussed can be normal up to 6 weeks , if bleeding past this or intensifies should have visit    "

## 2024-05-07 NOTE — PROGRESS NOTES
I have reviewed the notes, assessments, and/or procedures performed by Nancy Liao RN, IBCLC, I concur with her/his documentation of Carola Sultana MD 05/06/24

## 2024-05-20 ENCOUNTER — TELEPHONE (OUTPATIENT)
Dept: POSTPARTUM | Facility: CLINIC | Age: 29
End: 2024-05-20

## 2024-05-20 NOTE — TELEPHONE ENCOUNTER
Spoke with Shreya, she said this morning her left breast was tender/full, she nursed Jose Angelee (5wks) and there was no relief. She noticed when she went to take a shower, the inside of the left breast was red, warm & tender.  She has no c/o fever, chills, or body aches.  Unsure what to do now.

## 2024-05-20 NOTE — TELEPHONE ENCOUNTER
"Shreya's pain has been getting a little bit worse as today has gone on.  The entire medial half of her left breast is tender.  The red area is about the size of an orange.  There is no palpable firm area in the breast and the breast is emptying well. Shreya feels this started when she slept on her abdomen last night putting pressure on her full breast.   She has been feeding and pumping per her usual routine.  She has no chills, fever or malaise.    She has not \"done\" anything in addition her normal feeding routine.  She has not needed to take any pain medication and has not been using any cool compresses.  I recommended that Shreya continue to feed and pump per her usual routine.  I recommended cool compresses and OTC pain relievers as needed for pain.  I asked her to call with any update on her symptoms in the AM.  I recommended that she seek treatment if she develops fever, body aches, flu like symptoms and if the erythema and pain are worsening.    "

## 2024-05-28 ENCOUNTER — TELEPHONE (OUTPATIENT)
Dept: OTHER | Facility: HOSPITAL | Age: 29
End: 2024-05-28

## 2024-06-24 ENCOUNTER — TELEPHONE (OUTPATIENT)
Dept: FAMILY MEDICINE CLINIC | Facility: CLINIC | Age: 29
End: 2024-06-24

## 2024-06-26 NOTE — TELEPHONE ENCOUNTER
Pt called back, providers message was relayed (Pt needs to see GI if she needs help getting in touch with one let PCP know.)    Pt expressed understanding and had no further questions.     OSWALDO

## 2024-06-27 ENCOUNTER — OFFICE VISIT (OUTPATIENT)
Dept: GASTROENTEROLOGY | Facility: CLINIC | Age: 29
End: 2024-06-27

## 2024-06-27 ENCOUNTER — APPOINTMENT (OUTPATIENT)
Dept: LAB | Facility: HOSPITAL | Age: 29
End: 2024-06-27
Payer: COMMERCIAL

## 2024-06-27 VITALS
SYSTOLIC BLOOD PRESSURE: 107 MMHG | TEMPERATURE: 98 F | HEART RATE: 71 BPM | HEIGHT: 64 IN | WEIGHT: 179.8 LBS | BODY MASS INDEX: 30.7 KG/M2 | RESPIRATION RATE: 16 BRPM | OXYGEN SATURATION: 97 % | DIASTOLIC BLOOD PRESSURE: 72 MMHG

## 2024-06-27 DIAGNOSIS — K62.5 RECTAL BLEEDING: Primary | ICD-10-CM

## 2024-06-27 DIAGNOSIS — K62.89 RECTAL PAIN: ICD-10-CM

## 2024-06-27 DIAGNOSIS — K62.5 RECTAL BLEEDING: ICD-10-CM

## 2024-06-27 DIAGNOSIS — K64.9 HEMORRHOIDS, UNSPECIFIED HEMORRHOID TYPE: ICD-10-CM

## 2024-06-27 LAB
BASOPHILS # BLD AUTO: 0.01 THOUSANDS/ÂΜL (ref 0–0.1)
BASOPHILS NFR BLD AUTO: 0 % (ref 0–1)
EOSINOPHIL # BLD AUTO: 0.1 THOUSAND/ÂΜL (ref 0–0.61)
EOSINOPHIL NFR BLD AUTO: 2 % (ref 0–6)
ERYTHROCYTE [DISTWIDTH] IN BLOOD BY AUTOMATED COUNT: 12.1 % (ref 11.6–15.1)
HCT VFR BLD AUTO: 42.1 % (ref 34.8–46.1)
HGB BLD-MCNC: 14.4 G/DL (ref 11.5–15.4)
IMM GRANULOCYTES # BLD AUTO: 0.01 THOUSAND/UL (ref 0–0.2)
IMM GRANULOCYTES NFR BLD AUTO: 0 % (ref 0–2)
LYMPHOCYTES # BLD AUTO: 1.41 THOUSANDS/ÂΜL (ref 0.6–4.47)
LYMPHOCYTES NFR BLD AUTO: 32 % (ref 14–44)
MCH RBC QN AUTO: 30.2 PG (ref 26.8–34.3)
MCHC RBC AUTO-ENTMCNC: 34.2 G/DL (ref 31.4–37.4)
MCV RBC AUTO: 88 FL (ref 82–98)
MONOCYTES # BLD AUTO: 0.49 THOUSAND/ÂΜL (ref 0.17–1.22)
MONOCYTES NFR BLD AUTO: 11 % (ref 4–12)
NEUTROPHILS # BLD AUTO: 2.36 THOUSANDS/ÂΜL (ref 1.85–7.62)
NEUTS SEG NFR BLD AUTO: 55 % (ref 43–75)
NRBC BLD AUTO-RTO: 0 /100 WBCS
PLATELET # BLD AUTO: 184 THOUSANDS/UL (ref 149–390)
PMV BLD AUTO: 10.3 FL (ref 8.9–12.7)
RBC # BLD AUTO: 4.77 MILLION/UL (ref 3.81–5.12)
WBC # BLD AUTO: 4.38 THOUSAND/UL (ref 4.31–10.16)

## 2024-06-27 PROCEDURE — 36415 COLL VENOUS BLD VENIPUNCTURE: CPT

## 2024-06-27 PROCEDURE — 85025 COMPLETE CBC W/AUTO DIFF WBC: CPT

## 2024-06-27 RX ORDER — HYDROCORTISONE ACETATE 25 MG/1
SUPPOSITORY RECTAL
Qty: 12 SUPPOSITORY | Refills: 0 | Status: SHIPPED | OUTPATIENT
Start: 2024-06-27

## 2024-06-27 NOTE — PATIENT INSTRUCTIONS
Proceed with blood work today.   Can continue mirlax daily.   Can use Annusol suppository at bed time as needed.   Continue to drink at least 64 oz of water daily.   Continue to watch for red flag symptoms.   2-3 month f/u in [place], set up as virtual.     We did review GI red flag symptoms, including, but not limited to: chronic nausea, vomiting, diarrhea, chills, fever, and unintentional weight loss and should call or contact our office with any changes or concerns. I reviewed with the patient that if they notice any blood while vomiting or in their stool they should contact or office or go to the nearest emergency room for immediate evaluation. The patient was agreeable and verbalized an understanding.

## 2024-06-27 NOTE — LETTER
June 27, 2024     Patient: Carola Paula  YOB: 1995  Date of Visit: 6/27/2024      To Whom it May Concern:    Carola Paula is under my professional care. Carola was seen in my office on 6/27/2024. Carola may return to work on 6/28/24 .    If you have any questions or concerns, please don't hesitate to call.         Sincerely,          ZAIN Hamm        CC: No Recipients

## 2024-06-27 NOTE — PROGRESS NOTES
[facility] - Outpatient Consultation  Carola Paula 28 y.o. female MRN: [id number]  Encounter: [id number]          ASSESSMENT AND PLAN:    The patient presents today for an initial consultation for her rectal bleeding, hemorrhoids, and rectal pain.    Exam:  Oral mucosa normal upon visual inspection, without any sores, lesions, or ulcerations. Sclera without icterus and benign. Lung sounds CTA b/l. Normal S1 & S2 upon exam. Abdomen is soft, nondistended, nontender, with hypoactive bowel sounds x 4. No edema noted of the b/l lower extremities upon exam today. Skin is non-icteric.     Rectal Exam: 1 medium sized external hemorrhoid was noted at the approximate 6-7 o'clock location of the anus.  Possible internal hemorrhoid/fissure at the same location noted internally without any stool in the rectal vault or blood noted upon exam.  Please note Anna Morillo MA, was in the room the entire time for the rectal exam.    1. Rectal bleeding  -     CBC and differential; Future  -     hydrocortisone (ANUSOL-HC) 25 mg suppository; Insert 1 suppository rectally at bed time PRN.  2. Hemorrhoids, unspecified hemorrhoid type  -     hydrocortisone (ANUSOL-HC) 25 mg suppository; Insert 1 suppository rectally at bed time PRN.  3. Rectal pain  Assessment & Plan:  While the patient was in the office today, I discussed with the patient that at this point in time since her bleeding is consistent with a possible fissure/hemorrhoids and is quite common after pregnancy and vaginal delivery, for now, we will proceed with a CBC with differential just to confirm that her hemoglobin and hematocrit is normal and once we have the results of the blood work, office will contact her to review the results and discuss any other treatment plan recommendations as necessary.  The patient was agreeable and verbalized an understanding.    For now, I would like the patient to continue with the MiraLAX daily  and at least for the next 3 to 4 days use Anusol suppositories at bedtime and then can wean off of them as needed until the pain and discomfort subsides.  The hope is that with the MiraLAX consistently for the next few weeks, along with the Anusol suppositories, it will all the inflammation to calm down and let things heal and hopefully everything will resolve on its own over time.  However, I discussed with the patient that if her symptoms would not improve or worsen, she should contact our office and we may need to discuss evaluation and referral to either general surgery or colorectal surgery.  The patient was agreeable and verbalized an understanding.    Encouraged the patient to continue to drink at least 64 ounces of water daily.    Red flag symptoms were reviewed with the patient while in the office today.    The patient will schedule a follow up office visit in 2-3 months, but understands to call or contact our office if there are any issues or concerns in the mean time.    ______________________________________________________________________    HPI: The patient is a 28 y.o. female who presents today for a consultation regarding her rectal bleeding, hemorrhoids, and rectal pain.  The patient reports that during her pregnancy she did have hemorrhoids and at this point is 2-1/2 months postpartum and although there has been some improvement in her hemorrhoids, she feels that she may also have some kind of anal fissure at this point and is still afraid to have full bowel movements or allow her to fully empty or relieve herself because of the discomfort.  The patient reports that she has noted streaks of blood mixed with the stool in the toilet frequently as well as when she wipes but never copious amounts where the water color is red.    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  She  reports she was taking Colace which did seem to help soften the stool, however, she noticed, because she is breast-feeding, that when she was on the Colace her daughter seem to be much more colicky and have more gas and belly issues that completely resolved when she stopped taking the Colace.  She reports that just 2 days ago she started taking MiraLAX daily and is already starting to see some improvement as she had a bowel movement today and for the first time in quite some time she feels as though she actually fully emptied.  However, she was concerned about staying on MiraLAX secondary to breast-feeding.    The patient denies any reflux, nausea, dysphagia, vomiting, decreased appetite, unplanned weight loss, or abdominal pain. Water Intake: over 80 oz daily.     The patient reports that they have a BM daily and reports that it is sometimes relieving, without any consistent diarrhea, nocturnal BMs, melena. [place]: 2-4. Last BM: This morning. [place]: Yes.    PMH/PSH:   Abdominal/Chest Surgery: Appendectomy.  Colon Cancer: Denied  Any Cancer: Denied  Pre-Cancerous Polyps: N/A  Crohn's: N/A  Ulcerative Colitis: N/A  Lui's Esophagus: N/A  Celiac Disease: N/A  Liver Disease: Denied    Tobacco/Vaping: Denied  ETOH: Rarely  Marijuana: Med MJ, but has not since prior to pregnancy.  Illicit Drug Use: Denied    FH:  Colon Cancer: Denied  Any Cancer: Father; Duodenal/Liver, receiving chemo  Family Members with Pre-Cancerous Polyps: Denied  Crohn's: Denied  Ulcerative Colitis: Denied  Celiac Disease: Denied  Liver Disease: Yes, father Liver CA    Meds: Miralax daily  Daily NSAID Use: Denied  Any New Meds: Miralax  Daily Tylenol Use: Denied    Imaging: (None)    Endoscopy History: EGD: (None):     COLONOSCOPY: (None):     REVIEW OF SYSTEMS:    CONSTITUTIONAL: Denies any fever, chills, rigors, and weight loss.  HEENT: No earache or tinnitus. Denies hearing loss or visual disturbances.  CARDIOVASCULAR: No chest pain or  palpitations.   RESPIRATORY: Denies any cough, hemoptysis, shortness of breath or dyspnea on exertion.  GASTROINTESTINAL: As noted in the History of Present Illness.   GENITOURINARY: No problems with urination. Denies any hematuria or dysuria.  NEUROLOGIC: No dizziness or vertigo, denies headaches.   MUSCULOSKELETAL: Denies any muscle or joint pain.   SKIN: Denies skin rashes or itching.   ENDOCRINE: Denies excessive thirst. Denies intolerance to heat or cold.  PSYCHOSOCIAL: Denies depression or anxiety. Denies any recent memory loss.       Historical Information   Past Medical History:   Diagnosis Date    Anxiety     Depression     Hypertension     Hypertension during pregnancy in third trimester 02/25/2021    Urinary tract infection     Varicella      Past Surgical History:   Procedure Laterality Date    APPENDECTOMY      TYMPANOSTOMY TUBE PLACEMENT Bilateral 11/2022    WISDOM TOOTH EXTRACTION       Social History   Social History     Substance and Sexual Activity   Alcohol Use Never    Comment: prior to pregnancy     Social History     Substance and Sexual Activity   Drug Use Not Currently    Types: Marijuana    Comment: medical card- last used in June in 2023     Social History     Tobacco Use   Smoking Status Never   Smokeless Tobacco Never     Family History   Problem Relation Age of Onset    Hypertension Mother     Skin cancer Mother     Colon cancer Father         [email or web address]    No Known Problems Sister     No Known Problems Daughter     No Known Problems Maternal Grandmother     Prostate cancer Maternal Grandfather     No Known Problems Paternal Grandmother     Prostate cancer Paternal Grandfather     Atrial fibrillation Paternal Grandfather     Breast cancer Neg Hx     Ovarian cancer Neg Hx        Meds/Allergies       Current Outpatient Medications:     acetaminophen (TYLENOL) 325 mg tablet    benzocaine-menthol-lanolin-aloe (DERMOPLAST) 20-0.5 % topical spray    Blood Pressure Monitoring (Blood Pressure  Cuff) MISC    docusate sodium (COLACE) 100 mg capsule    hydrocortisone 1 % cream    ibuprofen (MOTRIN) 600 mg tablet    polyethylene glycol (MIRALAX) 17 g packet    Prenatal Vit-Fe Fumarate-FA (PRENATAL VITAMIN PO)    sertraline (ZOLOFT) 100 mg tablet    witch hazel-glycerin (TUCKS) topical pad    Allergies   Allergen Reactions    Milk (Cow) GI Intolerance    Pollen Extract Other (See Comments)     Post nasal drip           Objective     currently breastfeeding. There is no height or weight on file to calculate BMI.        PHYSICAL EXAM:      General Appearance:   Alert, cooperative, no distress   HEENT:   Normocephalic, atraumatic, anicteric.     Neck:  Supple, symmetrical, trachea midline   Lungs:   Clear to auscultation bilaterally; no rales, rhonchi or wheezing; respirations unlabored    Heart::   Regular rate and rhythm; no murmur, rub, or gallop.   Abdomen:   Soft, non-tender, non-distended; normal bowel sounds; no masses, no organomegaly    Genitalia:   Deferred    Rectal:   Deferred    Extremities:  No cyanosis, clubbing or edema    Pulses:  2+ and symmetric    Skin:  No jaundice, rashes, or lesions    Lymph nodes:  No palpable cervical lymphadenopathy        Lab Results:   No visits with results within 1 Day(s) from this visit.   Latest known visit with results is:   Admission on 04/14/2024, Discharged on 04/17/2024   Component Date Value    ABO Grouping 04/14/2024 A     Rh Factor 04/14/2024 Positive     Antibody Screen 04/14/2024 Negative     Specimen Expiration Date 04/14/2024 [id number]     WBC 04/14/2024 8.62     RBC 04/14/2024 4.24     Hemoglobin 04/14/2024 13.2     Hematocrit 04/14/2024 39.0     MCV 04/14/2024 92     MCH 04/14/2024 31.1     MCHC 04/14/2024 33.8     RDW 04/14/2024 13.6     MPV 04/14/2024 11.7     Platelets 04/14/2024 157     nRBC 04/14/2024 0     Segmented % 04/14/2024 69     Immature Grans % 04/14/2024 1     Lymphocytes % 04/14/2024 18     Monocytes % 04/14/2024 11     Eosinophils  Relative 04/14/2024 1     Basophils Relative 04/14/2024 0     Absolute Neutrophils 04/14/2024 6.06     Absolute Immature Grans 04/14/2024 0.05     Absolute Lymphocytes 04/14/2024 1.51     Absolute Monocytes 04/14/2024 0.92     Eosinophils Absolute 04/14/2024 0.06     Basophils Absolute 04/14/2024 0.02     Extra Tube 04/14/2024 yes     Syphilis Total Antibody 04/14/2024 Non-reactive     Sodium 04/14/2024 137     Potassium 04/14/2024 3.7     Chloride 04/14/2024 109 (H)     CO2 04/14/2024 19 (L)     ANION GAP 04/14/2024 9     BUN 04/14/2024 12     Creatinine 04/14/2024 0.74     Glucose 04/14/2024 84     Calcium 04/14/2024 9.5     AST 04/14/2024 11 (L)     ALT 04/14/2024 9     Alkaline Phosphatase 04/14/2024 107 (H)     Total Protein 04/14/2024 6.7     Albumin 04/14/2024 3.7     Total Bilirubin 04/14/2024 0.31     eGFR 04/14/2024 109     Creatinine, Ur 04/15/2024 86.5     Protein Urine Random 04/15/2024 22     Prot/Creat Ratio, Ur 04/15/2024 0.25 (H)     pH, Cord Art 04/15/2024 7.243     pCO2, Cord Art 04/15/2024 48.2     pO2, Cord Art 04/15/2024 25.3 (H)     HCO3, Cord Art 04/15/2024 20.3     Base Exc, Cord Art 04/15/2024 -7.2 (L)     O2 Content, Cord Art 04/15/2024 12.4     O2 Hgb, Arterial Cord 04/15/2024 57.4     pH, Cord Chong 04/15/2024 7.374     pCO2, Cord Chong 04/15/2024 32.4     pO2, Cord Chong 04/15/2024 27.0     HCO3, Cord Chong 04/15/2024 18.5     Base Exc, Cord Chong 04/15/2024 -5.6 (L)     O2 Cont, Cord Chong 04/15/2024 14.3     O2 HGB,VENOUS CORD 04/15/2024 67.6     PLACENTA IN STORAGE 04/15/2024 Placenta Discarded          Radiology Results:   No results found.

## 2024-06-28 PROBLEM — K64.9 HEMORRHOIDS: Status: ACTIVE | Noted: 2024-06-28

## 2024-06-28 PROBLEM — K62.5 RECTAL BLEEDING: Status: ACTIVE | Noted: 2024-06-28

## 2024-06-28 PROBLEM — K62.89 RECTAL PAIN: Status: ACTIVE | Noted: 2024-06-28

## 2024-06-28 NOTE — ASSESSMENT & PLAN NOTE
While the patient was in the office today, I discussed with the patient that at this point in time since her bleeding is consistent with a possible fissure/hemorrhoids and is quite common after pregnancy and vaginal delivery, for now, we will proceed with a CBC with differential just to confirm that her hemoglobin and hematocrit is normal and once we have the results of the blood work, office will contact her to review the results and discuss any other treatment plan recommendations as necessary.  The patient was agreeable and verbalized an understanding.    For now, I would like the patient to continue with the MiraLAX daily and at least for the next 3 to 4 days use Anusol suppositories at bedtime and then can wean off of them as needed until the pain and discomfort subsides.  The hope is that with the MiraLAX consistently for the next few weeks, along with the Anusol suppositories, it will all the inflammation to calm down and let things heal and hopefully everything will resolve on its own over time.  However, I discussed with the patient that if her symptoms would not improve or worsen, she should contact our office and we may need to discuss evaluation and referral to either general surgery or colorectal surgery.  The patient was agreeable and verbalized an understanding.    Encouraged the patient to continue to drink at least 64 ounces of water daily.    Red flag symptoms were reviewed with the patient while in the office today.

## 2024-07-07 ENCOUNTER — NURSE TRIAGE (OUTPATIENT)
Dept: OTHER | Facility: OTHER | Age: 29
End: 2024-07-07

## 2024-07-07 NOTE — TELEPHONE ENCOUNTER
"Regarding: Posible shingles/rash blisters on back, headache , diarrhea , and  nerve pain on right side  ----- Message from Kimber ROGERS sent at 7/7/2024  4:59 PM EDT -----  \"I think I have shingles . I have rash blisters on my back, headache , diarrhea , and  nerve pain on right side . I am not sure what to do and I have a 2 1/2 month old baby home\"    "

## 2024-07-07 NOTE — TELEPHONE ENCOUNTER
[facility] to on call provider Dr Evans: Pt is calling with complaint of possible shingles rash and concern with caring for her 2 month old baby . No known exposure to chicken pox, has headache,fatigue, diarrhea three loose stools today and rt side leg nerve pain. No fever. The rash looks like red bumps in a cluster fluid filled, three clusters on the right side of her back. Rash is not itchy put is irritated and painful to touch What precautions for the baby and breastfeeding?

## 2024-07-08 ENCOUNTER — OFFICE VISIT (OUTPATIENT)
Dept: FAMILY MEDICINE CLINIC | Facility: CLINIC | Age: 29
End: 2024-07-08
Payer: COMMERCIAL

## 2024-07-08 VITALS
SYSTOLIC BLOOD PRESSURE: 116 MMHG | DIASTOLIC BLOOD PRESSURE: 80 MMHG | WEIGHT: 179.4 LBS | BODY MASS INDEX: 30.63 KG/M2 | OXYGEN SATURATION: 99 % | HEART RATE: 80 BPM | TEMPERATURE: 97.2 F | HEIGHT: 64 IN

## 2024-07-08 DIAGNOSIS — B02.9 HERPES ZOSTER WITHOUT COMPLICATION: Primary | ICD-10-CM

## 2024-07-08 PROCEDURE — 99214 OFFICE O/P EST MOD 30 MIN: CPT | Performed by: PHYSICIAN ASSISTANT

## 2024-07-08 RX ORDER — ACYCLOVIR 800 MG/1
800 TABLET ORAL
Qty: 35 TABLET | Refills: 0 | Status: SHIPPED | OUTPATIENT
Start: 2024-07-08 | End: 2024-07-15

## 2024-07-08 NOTE — ASSESSMENT & PLAN NOTE
Prescription for acyclovir reviewed breast-feeding precautions.  Patient is going to contact pediatrician as well.  Recommended that she keep area covered to avoid infecting her 2-month-old.  She will notify us if symptoms do not improve or worsen

## 2024-07-08 NOTE — PATIENT INSTRUCTIONS
"  Patient Education     Shingles   The Basics   Written by the doctors and editors at [facility]   What is shingles? -- Shingles is a painful rash that is usually shaped like a band (picture 1). It can affect people of all ages, but it is most common in those older than 50. It is also more common in people whose immune system (the body's infection-fighting system) is weaker than normal. Another name for shingles is \"herpes zoster.\"  Shingles is caused by a virus called varicella-zoster virus. This is the same virus that causes chickenpox. After someone has chickenpox, the virus sometimes hides out, \"asleep\" in the body. Years later, it can \"wake up\" and cause shingles. The first time a person is infected with that virus, they get chickenpox, not shingles.  Is shingles contagious? -- In a way, yes. It is not possible to \"catch\" shingles from someone who has the rash. But if you have never had chickenpox or gotten the chickenpox vaccine, it is possible to \"catch\" the virus and then get sick with chickenpox. Shingles and chickenpox are caused by the same virus.  You probably will not catch the virus (or get chickenpox) if you:   Had chickenpox or shingles in the past   Had the chickenpox vaccine   Were born in the  before 1980 (most people born before 1980 have had chickenpox even if they don't remember it)  If you have never had chickenpox or the chickenpox vaccine, avoid contact with anyone who has shingles. It is especially important that you do not touch their rash. If you do, you could get sick with chickenpox. In rare cases, it is possible to get chickenpox from just being near someone with shingles.  Some people have a higher risk than others for getting very sick or having other problems because of chickenpox. People at highest risk include:   People who are pregnant - Pregnant people can pass the chickenpox virus to their growing baby.   Premature babies   People whose immune system (the body's " "infection-fighting system) is weaker than normal  What are the symptoms of shingles? -- At first, shingles causes weird sensations on your skin. You might feel itching, burning, pain, or tingling. Some people get a fever, feel sick, or get a headache. Within 1 to 2 days, a rash with blisters appears. Blisters most often appear in a band across the chest and back (picture 1 and picture 2). But they can show up on other parts of the body, too. The blisters cause pain that can be mild or severe.  Within 3 to 4 days, shingles blisters can become open sores or \"ulcers.\" These ulcers can sometimes get infected. Within 7 to 10 days, the rash should scab over and start to heal.  Can shingles be serious? -- Yes. Shingles can be serious, but this is rare. About 1 out of 10 people with shingles will get something called \"postherpetic neuralgia\" (\"PHN\"). People with PHN keep feeling pain or discomfort even after their rash goes away. This pain can last for months or even years. It can be so bad that it makes it hard to sleep, causes weight loss, and leads to depression.  Shingles can also cause:   Skin infections   Eye problems (if the rash is near the eye)   Ear problems (if the rash is near the ear)  In rare cases, shingles can cause serious problems with the brain or nerves. But this is very uncommon.  Will I need tests? -- It depends. Your doctor or nurse will probably be able to tell if you have shingles by doing an exam and asking about your symptoms. In some cases, they might take a sample of fluid from your rash for testing.  If you have a rash that you think might be shingles, call your doctor or nurse right away. They will do an exam and might recommend treatment.  How is shingles treated? -- It depends on how long you have had the shingles rash:   If you have had the rash for less than 3 days, your doctor will prescribe a medicine to help you get rid of the virus. These medicines are called \"antivirals.\" They can " speed your recovery and lower the chances of problems such as PHN.   If you have had the rash for more than 3 days, your doctor might or might not prescribe medicine. Antiviral medicine might help if new blisters are still appearing, or if your immune system is weaker than normal.  Many people can use non-prescription pain medicines to treat their pain. But some people need prescription medicines.  Is there anything I can do on my own to feel better? -- Yes. You can:   Take all of your medicines as instructed.   Keep your rash clean and dry. Do not use creams or gels unless your doctor or nurse says that you should.   Try not to scratch your skin. It might help to cover it with a clean dressing.   Wear loose clothing if this makes you more comfortable.  Can shingles be prevented? -- People can lower their chances of getting shingles by getting the shingles vaccine. The vaccine might also make shingles symptoms milder if they do occur.  The shingles vaccine is typically recommended for adults over 50 years. It might also be recommended for younger adults, if their immune system is weaker than normal. Your doctor can tell you if you should get a shingles vaccine.  If you do get shingles, you can prevent spreading the virus to other people if you:   Keep your rash covered.   Wash your hands often until your rash has scabbed over.  When should I call the doctor? -- Call your doctor or nurse right away if you think that you might have shingles. The sooner you can start treatment, the better.  If you are already being treated for shingles, call your doctor or nurse if:   Your pain gets worse and is not helped by over-the-counter medicines.   You have increased redness or swelling around your rash.   You get a fever.   You have eye symptoms like redness, irritation, or vision problems.   You have ear symptoms like pain or trouble hearing.  All topics are updated as new evidence becomes available and our peer review process  is complete.  This topic retrieved from [employer] on: Mar 20, 2024.  Topic 94220 Version 17.0  Release: 32.2.4 - C32.78  © 2024 UpToDate, Inc. and/or its affiliates. All rights reserved.  picture 1: Shingles (herpes zoster)     Graphic 57567 Version 2.0  picture 2: Shingles (herpes zoster)     Graphic 68287 Version 2.0  Consumer Information Use and Disclaimer   Disclaimer: This generalized information is a limited summary of diagnosis, treatment, and/or medication information. It is not meant to be comprehensive and should be used as a tool to help the user understand and/or assess potential diagnostic and treatment options. It does NOT include all information about conditions, treatments, medications, side effects, or risks that may apply to a specific patient. It is not intended to be medical advice or a substitute for the medical advice, diagnosis, or treatment of a health care provider based on the health care provider's examination and assessment of a patient's specific and unique circumstances. Patients must speak with a health care provider for complete information about their health, medical questions, and treatment options, including any risks or benefits regarding use of medications. This information does not endorse any treatments or medications as safe, effective, or approved for treating a specific patient. UpToDate, Inc. and its affiliates disclaim any warranty or liability relating to this information or the use thereof.The use of this information is governed by the Terms of Use, available at https://www.woltersZhongheeduuwer.com/en/know/clinical-effectiveness-terms. 2024© UpToDate, Inc. and its affiliates and/or licensors. All rights reserved.  Copyright   © 2024 UpToDate, Inc. and/or its affiliates. All rights reserved.    Patient Education     Acyclovir (Systemic) (malik haynes)   Brand Names: US Zovirax [DSC]   Brand Names: Tray APO-Acyclovir; MINT-Acyclovir; MYLAN-Acyclovir; TEVA-Acyclovir; Zovirax    What is this drug used for?   It is used to treat shingles or certain other herpes infections.  It is used to treat chickenpox.  It may be given to you for other reasons. Talk with the doctor.  What do I need to tell my doctor BEFORE I take this drug?   If you are allergic to this drug; any part of this drug; or any other drugs, foods, or substances. Tell your doctor about the allergy and what signs you had.  This drug may interact with other drugs or health problems.  Tell your doctor and pharmacist about all of your drugs (prescription or OTC, natural products, vitamins) and health problems. You must check to make sure that it is safe for you to take this drug with all of your drugs and health problems. Do not start, stop, or change the dose of any drug without checking with your doctor.  What are some things I need to know or do while I take this drug?   For all uses of this drug:   Tell all of your health care providers that you take this drug. This includes your doctors, nurses, pharmacists, and dentists.  Drink lots of noncaffeine liquids every day unless told to drink less liquid by your doctor.  If you are 65 or older, use this drug with care. You could have more side effects.  Tell your doctor if you are pregnant, plan on getting pregnant, or are breast-feeding. You will need to talk about the benefits and risks to you and the baby.  For herpes infection:   This drug is not a cure for herpes infections. Talk with the doctor.  If you have genital herpes, this drug will not stop it from spreading. Do not have any kind of sex when you have sores or other signs of genital herpes. Genital herpes can also be spread if you do not have any signs. Do not have any kind of sex without using a latex or polyurethane condom. Talk with your doctor.  What are some side effects that I need to call my doctor about right away?   WARNING/CAUTION: Even though it may be rare, some people may have very bad and sometimes deadly  side effects when taking a drug. Tell your doctor or get medical help right away if you have any of the following signs or symptoms that may be related to a very bad side effect:  Signs of an allergic reaction, like rash; hives; itching; red, swollen, blistered, or peeling skin with or without fever; wheezing; tightness in the chest or throat; trouble breathing, swallowing, or talking; unusual hoarseness; or swelling of the mouth, face, lips, tongue, or throat.  Behavior problems.  Mood changes.  Feeling confused.  Hallucinations (seeing or hearing things that are not there).  Seizures.  Shakiness.  Feeling dizzy or sleepy.  A burning, numbness, or tingling feeling that is not normal.  Change in speech.  Severe and sometimes deadly kidney problems have happened with this drug. Call your doctor right away if you are unable to pass urine or if you have blood in the urine, a change in the amount of urine passed, or unexpected weight gain.  Very bad and sometimes deadly blood problems like thrombotic thrombocytopenic purpura/hemolytic uremic syndrome (TTP/HUS) have happened with this drug in some people. Call your doctor right away if you feel very tired or weak or have any bruising or bleeding; dark urine or yellow skin or eyes; pale skin; change in the amount of urine passed; change in eyesight; change in strength on 1 side is greater than the other, trouble speaking or thinking, or change in balance; or fever.  What are some other side effects of this drug?   All drugs may cause side effects. However, many people have no side effects or only have minor side effects. Call your doctor or get medical help if any of these side effects or any other side effects bother you or do not go away:  All products:   Upset stomach or throwing up.  Feeling tired or weak.  Injection:   Irritation where the shot is given.  These are not all of the side effects that may occur. If you have questions about side effects, call your doctor.  Call your doctor for medical advice about side effects.  You may report side effects to your national health agency.  You may report side effects to the FDA at 1-800.732.4743. You may also report side effects at https://www.fda.gov/medwatch.  How is this drug best taken?   Use this drug as ordered by your doctor. Read all information given to you. Follow all instructions closely.  All oral products:   Take with or without food. Take with food if it causes an upset stomach.  If you are taking this drug to treat cold sores, chickenpox, shingles, or genital herpes, start this drug as soon as you can after your signs start. This drug may not help if you start taking it too late.  Keep taking this drug as you have been told by your doctor or other health care provider, even if you feel well.  Liquid (suspension):   Shake well before use.  Measure liquid doses carefully. Use the measuring device that comes with this drug. If there is none, ask the pharmacist for a device to measure this drug.  Injection:   It is given as an infusion into a vein over a period of time.  What do I do if I miss a dose?   All oral products:   Take a missed dose as soon as you think about it.  If it is close to the time for your next dose, skip the missed dose and go back to your normal time.  Do not take 2 doses at the same time or extra doses.  Injection:   Call your doctor to find out what to do.  How do I store and/or throw out this drug?   All oral products:   Store at room temperature protected from light. Store in a dry place. Do not store in a bathroom.  Injection:   If you need to store this drug at home, talk with your doctor, nurse, or pharmacist about how to store it.  All products:   Keep all drugs in a safe place. Keep all drugs out of the reach of children and pets.  Throw away unused or  drugs. Do not flush down a toilet or pour down a drain unless you are told to do so. Check with your pharmacist if you have questions  about the best way to throw out drugs. There may be drug take-back programs in your area.  General drug facts   If your symptoms or health problems do not get better or if they become worse, call your doctor.  Do not share your drugs with others and do not take anyone else's drugs.  Some drugs may have another patient information leaflet. If you have any questions about this drug, please talk with your doctor, nurse, pharmacist, or other health care provider.  Some drugs may have another patient information leaflet. Check with your pharmacist. If you have any questions about this drug, please talk with your doctor, nurse, pharmacist, or other health care provider.  If you think there has been an overdose, call your poison control center or get medical care right away. Be ready to tell or show what was taken, how much, and when it happened.  Consumer Information Use and Disclaimer   This generalized information is a limited summary of diagnosis, treatment, and/or medication information. It is not meant to be comprehensive and should be used as a tool to help the user understand and/or assess potential diagnostic and treatment options. It does NOT include all information about conditions, treatments, medications, side effects, or risks that may apply to a specific patient. It is not intended to be medical advice or a substitute for the medical advice, diagnosis, or treatment of a health care provider based on the health care provider's examination and assessment of a patient's specific and unique circumstances. Patients must speak with a health care provider for complete information about their health, medical questions, and treatment options, including any risks or benefits regarding use of medications. This information does not endorse any treatments or medications as safe, effective, or approved for treating a specific patient. UpToDate, Inc. and its affiliates disclaim any warranty or liability relating to this  information or the use thereof. The use of this information is governed by the Terms of Use, available at https://www.wolterskluwer.com/en/know/clinical-effectiveness-terms.  Last Reviewed Date   2023-02-14  Copyright   © 2024 UpToDate, Inc. and its affiliates and/or licensors. All rights reserved.

## 2024-07-08 NOTE — PROGRESS NOTES
Ambulatory Visit  Name: Carola Paula      : 1995      MRN: [id number]  Encounter Provider: Ina Pinto PA-C  Encounter Date: 2024   Encounter department: [facility]    Assessment & Plan   1. Herpes zoster without complication  Assessment & Plan:  Prescription for acyclovir reviewed breast-feeding precautions.  Patient is going to contact pediatrician as well.  Recommended that she keep area covered to avoid infecting her 2-month-old.  She will notify us if symptoms do not improve or worsen  Orders:  -     acyclovir (ZOVIRAX) 800 mg tablet; Take 1 tablet (800 mg total) by mouth 5 (five) times a day for 7 days       History of Present Illness   {Disappearing Hyperlinks I Encounters * My Last Note * Since Last Visit * History :84656}  Shreya is a very pleasant 29-year-old female who is here today complaining of a rash on her right lower back that she noticed yesterday.  A few days prior, she admits that she had pain on her right thigh that radiated into her groin.  The rash does burn.  She admits that she also felt fatigued and had some mild diarrhea.  She admits that she has been under a lot of stress.  Her 3-year-old daughter was recently hospitalized for a respiratory infection.  She admits that she did have chickenpox as a child.  She has been keeping the area covered.        Review of Systems   Constitutional:  Positive for fatigue. Negative for chills, diaphoresis and fever.   HENT:  Negative for congestion, ear pain, postnasal drip, rhinorrhea, sneezing, sore throat and trouble swallowing.    Eyes:  Negative for pain and visual disturbance.   Respiratory:  Negative for apnea, cough, shortness of breath and wheezing.    Cardiovascular:  Negative for chest pain and palpitations.   Gastrointestinal:  Positive for diarrhea. Negative for abdominal pain, constipation, nausea and vomiting.   Genitourinary:  Negative for dysuria and hematuria.   Musculoskeletal:  Negative for arthralgias, gait  "problem and myalgias.   Skin:  Positive for rash.   Neurological:  Negative for dizziness, syncope, weakness, light-headedness, numbness and headaches.   Psychiatric/Behavioral:  Negative for suicidal ideas. The patient is not nervous/anxious.        Objective   {Disappearing Hyperlinks   Review Vitals * Enter New Vitals * Results Review * Labs * Imaging * Cardiology * Procedures * Lung Cancer Screening :67252}  /80   Pulse 80   Temp (!) 97.2 °F (36.2 °C)   Ht 5' 4\" (1.626 m)   Wt 81.4 kg (179 lb 6.4 oz)   SpO2 99%   Breastfeeding Yes   BMI 30.79 kg/m²     Physical Exam  Vitals and nursing note reviewed.   Constitutional:       General: She is not in acute distress.     Appearance: She is well-developed. She is not diaphoretic.   HENT:      Head: Normocephalic and atraumatic.      Right Ear: Hearing, tympanic membrane, ear canal and external ear normal.      Left Ear: Hearing, tympanic membrane, ear canal and external ear normal.      Nose: Nose normal. No mucosal edema or rhinorrhea.      Mouth/Throat:      Pharynx: No oropharyngeal exudate or posterior oropharyngeal erythema.   Eyes:      Extraocular Movements: Extraocular movements intact.   Cardiovascular:      Rate and Rhythm: Normal rate and regular rhythm.      Heart sounds: Normal heart sounds. No murmur heard.     No friction rub. No gallop.   Pulmonary:      Effort: Pulmonary effort is normal. No respiratory distress.      Breath sounds: Normal breath sounds. No wheezing or rales.   Musculoskeletal:         General: Normal range of motion.      Cervical back: Normal range of motion and neck supple.   Lymphadenopathy:      Cervical: No cervical adenopathy.   Skin:     General: Skin is warm and dry.      Findings: No rash.          Neurological:      Mental Status: She is alert and oriented to person, place, and time.   Psychiatric:         Behavior: Behavior normal.         Thought Content: Thought content normal.         Judgment: Judgment " normal.       Administrative Statements {Disappearing Hyperlinks I  Level of Service * PCMH/PCSP:90625}

## 2024-07-08 NOTE — LETTER
July 8, 2024     Patient: Carola Paula  YOB: 1995  Date of Visit: 7/8/2024      To Whom it May Concern:    Carola Paula is under my professional care. Carola was seen in my office on 7/8/2024. Carola may return to work on 7/9/2024 .    If you have any questions or concerns, please don't hesitate to call.         Sincerely,            Ina Pinto PA-C

## 2025-01-28 ENCOUNTER — TELEMEDICINE (OUTPATIENT)
Dept: OTHER | Facility: HOSPITAL | Age: 30
End: 2025-01-28
Payer: COMMERCIAL

## 2025-01-28 DIAGNOSIS — J01.40 ACUTE NON-RECURRENT PANSINUSITIS: Primary | ICD-10-CM

## 2025-01-28 PROBLEM — O35.EXX0 FETAL RENAL ANOMALY, SINGLE GESTATION: Status: RESOLVED | Noted: 2024-03-04 | Resolved: 2025-01-28

## 2025-01-28 PROBLEM — Z3A.39 39 WEEKS GESTATION OF PREGNANCY: Status: RESOLVED | Noted: 2023-10-13 | Resolved: 2025-01-28

## 2025-01-28 PROBLEM — K62.5 RECTAL BLEEDING: Status: RESOLVED | Noted: 2024-06-28 | Resolved: 2025-01-28

## 2025-01-28 PROBLEM — O13.9 GESTATIONAL HYPERTENSION: Status: RESOLVED | Noted: 2024-04-15 | Resolved: 2025-01-28

## 2025-01-28 PROBLEM — B02.9 HERPES ZOSTER WITHOUT COMPLICATION: Status: RESOLVED | Noted: 2024-07-08 | Resolved: 2025-01-28

## 2025-01-28 PROBLEM — K62.89 RECTAL PAIN: Status: RESOLVED | Noted: 2024-06-28 | Resolved: 2025-01-28

## 2025-01-28 PROBLEM — O99.210 OBESITY IN PREGNANCY: Status: RESOLVED | Noted: 2023-10-11 | Resolved: 2025-01-28

## 2025-01-28 PROCEDURE — 99213 OFFICE O/P EST LOW 20 MIN: CPT | Performed by: PHYSICIAN ASSISTANT

## 2025-01-28 NOTE — PROGRESS NOTES
Virtual Regular Visit  Name: Carola Paula      : 1995      MRN: [id number]  Encounter Provider: Shannon D Severino, PA-C  Encounter Date: 2025   Encounter department: VIRTUAL CARE       Verification of patient location:  Patient is located at Other in the following state in which I hold an active license [place] :  Assessment & Plan  Acute non-recurrent pansinusitis    Orders:    amoxicillin-clavulanate (AUGMENTIN) 875-125 mg per tablet; Take 1 tablet by mouth 2 (two) times a day for 10 days        Encounter provider Shannon D Severino, PA-C    The patient was identified by name and date of birth. Carola Paula was informed that this is a telemedicine visit and that the visit is being conducted through the Epic Embedded platform. She agrees to proceed..  My office door was closed. No one else was in the room.  She acknowledged consent and understanding of privacy and security of the video platform. The patient has agreed to participate and understands they can discontinue the visit at any time.    Patient is aware this is a billable service.     History was obtained from: History obtained from: patient  History of Present Illness     Pt reports has been sick with sinus congestion and pressure on R side of face x 10 days. Gets R sided headaches with spots in vision which is common for her. Sx overall not improving, pressure getting worse. Hasn't taken anything aside from ibuprofen since she is breast feeding. Help minimally. No fevers. No covid negative. No recent abx. No CP, SOB, NVD.       Review of Systems   Constitutional:  Negative for fever.   HENT:  Positive for congestion, sinus pressure and sinus pain. Negative for nosebleeds.    Eyes:  Negative for redness.   Respiratory:  Negative for shortness of breath.    Cardiovascular:  Negative for chest pain.   Gastrointestinal:  Negative for blood in stool.   Genitourinary:  Negative for hematuria.   Musculoskeletal:  Negative for gait problem.    Skin:  Negative for rash.   Neurological:  Positive for headaches. Negative for seizures.   Psychiatric/Behavioral:  Negative for behavioral problems.        Objective   There were no vitals taken for this visit.    Physical Exam  Constitutional:       General: She is not in acute distress.     Appearance: Normal appearance. She is not toxic-appearing.   HENT:      Head: Normocephalic and atraumatic.      Nose: No rhinorrhea.      Mouth/Throat:      Mouth: Mucous membranes are moist.   Eyes:      Conjunctiva/sclera: Conjunctivae normal.   Pulmonary:      Effort: Pulmonary effort is normal. No respiratory distress.      Breath sounds: No wheezing (no gross audible wheeze through computer).   Musculoskeletal:      Cervical back: Normal range of motion.   Skin:     Findings: No rash (on face or neck).   Neurological:      Mental Status: She is alert.      Cranial Nerves: No dysarthria or facial asymmetry.   Psychiatric:         Mood and Affect: Mood normal.         Behavior: Behavior normal.         Visit Time  Total Visit Duration: 7 minutes not including the time spent for establishing the audio/video connection.

## 2025-01-29 ENCOUNTER — TELEPHONE (OUTPATIENT)
Dept: OTHER | Facility: HOSPITAL | Age: 30
End: 2025-01-29

## 2025-01-29 DIAGNOSIS — J01.00 ACUTE NON-RECURRENT MAXILLARY SINUSITIS: Primary | ICD-10-CM

## 2025-01-29 RX ORDER — AZITHROMYCIN 250 MG/1
TABLET, FILM COATED ORAL
Qty: 6 TABLET | Refills: 0 | Status: SHIPPED | OUTPATIENT
Start: 2025-01-29 | End: 2025-02-02

## 2025-03-05 ENCOUNTER — CONSULT (OUTPATIENT)
Dept: DERMATOLOGY | Facility: CLINIC | Age: 30
End: 2025-03-05
Payer: COMMERCIAL

## 2025-03-05 DIAGNOSIS — D22.62 MULTIPLE BENIGN MELANOCYTIC NEVI OF UPPER AND LOWER EXTREMITIES AND TRUNK: ICD-10-CM

## 2025-03-05 DIAGNOSIS — D22.72 MULTIPLE BENIGN MELANOCYTIC NEVI OF UPPER AND LOWER EXTREMITIES AND TRUNK: ICD-10-CM

## 2025-03-05 DIAGNOSIS — D22.71 MULTIPLE BENIGN MELANOCYTIC NEVI OF UPPER AND LOWER EXTREMITIES AND TRUNK: ICD-10-CM

## 2025-03-05 DIAGNOSIS — D22.61 MULTIPLE BENIGN MELANOCYTIC NEVI OF UPPER AND LOWER EXTREMITIES AND TRUNK: ICD-10-CM

## 2025-03-05 DIAGNOSIS — L70.0 ACNE VULGARIS: Primary | ICD-10-CM

## 2025-03-05 DIAGNOSIS — D18.01 CHERRY ANGIOMA: ICD-10-CM

## 2025-03-05 DIAGNOSIS — D22.5 MULTIPLE BENIGN MELANOCYTIC NEVI OF UPPER AND LOWER EXTREMITIES AND TRUNK: ICD-10-CM

## 2025-03-05 PROCEDURE — 99244 OFF/OP CNSLTJ NEW/EST MOD 40: CPT | Performed by: DERMATOLOGY

## 2025-03-05 RX ORDER — CLINDAMYCIN PHOSPHATE 10 UG/ML
LOTION TOPICAL
Qty: 60 ML | Refills: 5 | Status: SHIPPED | OUTPATIENT
Start: 2025-03-05

## 2025-03-05 NOTE — PROGRESS NOTES
"[facility] Note     Patient Name: Carola Paula  Encounter Date: 3/5/25     Have you been cared for by a [facility] Dermatologist in the last 3 years and, if so, which description applies to you?    NO.   I am considered a \"new\" patient and must complete all patient intake questions. I am FEMALE/of child-bearing potential.    REVIEW OF SYSTEMS:  Have you recently had or currently have any of the following? Recent fever or chills? No  Any non-healing wound? No  Are you pregnant or planning to become pregnant? No  Are you currently or planning to be nursing or breast feeding? YES, Currently breastfeeding   PAST MEDICAL HISTORY:  Have you personally ever had or currently have any of the following?  If \"YES,\" then please provide more detail. Skin cancer (such as Melanoma, Basal Cell Carcinoma, Squamous Cell Carcinoma?  No  Tuberculosis, HIV/AIDS, Hepatitis B or C: No  Radiation Treatment No   HISTORY OF IMMUNOSUPPRESSION:   Do you have a history of any of the following:  Systemic Immunosuppression such as Diabetes, Biologic or Immunotherapy, Chemotherapy, Organ Transplantation, Bone Marrow Transplantation or Prednsione?  No    Answering \"YES\" requires the addition of the dotphrase \"IMMUNOSUPPRESSED\" as the first diagnosis of the patient's visit.   FAMILY HISTORY:  Any \"first degree relatives\" (parent, brother, sister, or child) with the following?    Skin Cancer, Pancreatic or Other Cancer? YES, Mother had BCC   PATIENT EXPERIENCE:    Do you want the Dermatologist to perform a COMPLETE skin exam today including a clinical examination under the \"bra and underwear\" areas?  Yes  If necessary, do we have your permission to call and leave a detailed message on your Preferred Phone number that includes your specific medical information?  Yes      Allergies   Allergen Reactions    Milk (Cow) GI Intolerance    Pollen Extract Other (See Comments)     Post nasal drip      Current Outpatient Medications:     " "polyethylene glycol (MIRALAX) 17 g packet, Take 17 g by mouth daily as needed (constipation) (Patient not taking: Reported on 1/28/2025), Disp: , Rfl:     predniSONE 20 mg tablet, 3 tabs day 1-3, 2 tabs day 4-6, 1 tab day 7-9, Disp: 18 tablet, Rfl: 0          Whom besides the patient is providing clinical information about today's encounter?   NO ADDITIONAL HISTORIAN (patient alone provided history)    Physical Exam and Assessment/Plan by Diagnosis:    CHERRY ANGIOMAS     Physical Exam:  Anatomic Location Affected:  Trunk   Morphological Description:  Scattered cherry red papules  Denies pain, itch, bleeding. No treatments tried. Present for years. Present constantly; no modifying factors which make it worse or better.     Assessment and Plan:  Based on a thorough discussion of this condition and the management approach to it (including a comprehensive discussion of the known risks, side effects and potential benefits of treatment), the patient (family) agrees to implement the following specific plan:  Reassure benign         MULTIPLE MELANOCYTIC NEVI (\"Moles\")     Physical Exam:  Anatomic Location Affected: Trunk and extremities (INCLUDING SOC ON LEFT CHEST)  Morphological Description:  Scattered, round to ovoid, symmetrical-appearing, even bordered, skin colored to dark brown macules/papules  Denies pain, itch, bleeding. No treatments tried. Present for years. Present constantly; no modifying factors which make it worse or better. Denies actively changing or growing moles.      Assessment and Plan:  Based on a thorough discussion of this condition and the management approach to it (including a comprehensive discussion of the known risks, side effects and potential benefits of treatment), the patient (family) agrees to implement the following specific plan:  Reassure benign  Monitor for changes  Use sun protection.  Apply SPF 30 or higher at least three times a day.  Wear sun protecting clothing and hats.     " "  Worrisome signs of skin malignancy discussed, questions answered. Regular self-skin check discussed. Advised to call or return to office if patient notices any spots of concern, rapidly growing/changing lesions, bleeding lesions, non-healing lesions. Advised regular SPF use.     ACNE VULGARIS (\"COMMON ACNE\")    Physical Exam:  Anatomic Location Affected: face  Morphological Description: inflammatory papules    Additional History of Present Condition:  Patient reports acne flares around her period. She is currently using CeraVe wash.     Assessment and Plan:  We reviewed the causes of acne, the “kinds” of acne, and the expected clinical course.  We discussed treatment options ranging from over-the-counter products, topical retinoids, antibiotics, BP, hormonal therapies (OCPs/spironolactone), and isotretinoin (Accutane).  We reviewed specific over-the-counter interventions and medications. Recommended typical hygiene measures washing regularly with mild cleanser, and refraining from picking and popping any pimples. Recommended non-comedogenic sunscreen use daily.   Expectations of therapy discussed. Side effects, risks and benefits of medications discussed. A comprehensive handout with treatment plan provided. The phone number to call in case of questions or concerns (and instructions to stop medications in such a scenario) was provided.  After lengthy discussion of etiology and treatment options, we decided to implement the following personalized treatment plan:      Mornin. Wash with over the counter Cerave Acne Foaming Cream cleanser with Benzoyl peroxide. This will bleach your towels. Will not bleach your skin.   2. Apply Clindamycin lotion to entire affected area   3. Follow with an oil-free sunscreen (SPF 30 or higher). Some options include Neutrogena oil-free moisturizer with SPF           Scribe Attestation      I,:  Ricardo Martínez am acting as a scribe while in the presence of the attending " physician.:       I,:  John Castle MD personally performed the services described in this documentation    as scribed in my presence.:

## 2025-05-02 ENCOUNTER — TELEMEDICINE (OUTPATIENT)
Dept: OTHER | Facility: HOSPITAL | Age: 30
End: 2025-05-02
Payer: COMMERCIAL

## 2025-05-02 DIAGNOSIS — R05.1 ACUTE COUGH: Primary | ICD-10-CM

## 2025-05-02 PROCEDURE — 99213 OFFICE O/P EST LOW 20 MIN: CPT | Performed by: NURSE PRACTITIONER

## 2025-05-02 NOTE — PATIENT INSTRUCTIONS
Recommended plain robitussin for cough. Avoid decongestants.  Rest and drink plenty of fluids. A cool mist humidifier can be helpful.  If you develop a worsening cough, chest pain, shortness of breath, palpitations, coughing up blood, prolonged high fever, any new or concerning symptoms please return or proceed ER.  Recommend following up with PCP in 3-5 days

## 2025-05-02 NOTE — PROGRESS NOTES
Virtual Regular Visit  Name: Carola Paula      : 1995      MRN: [id number]  Encounter Provider: Your Video Visit Provider  Encounter Date: 2025   Encounter department: VIRTUAL CARE       Verification of patient location:  Patient is located at Home in the following state in which I hold an active license [place] :  Assessment & Plan  Acute cough             Encounter provider Your Video Visit Provider    The patient was identified by name and date of birth. Carola Paula was informed that this is a telemedicine visit and that the visit is being conducted through the Epic Embedded platform. She agrees to proceed..  My office door was closed. No one else was in the room. She acknowledged consent and understanding of privacy and security of the video platform. The patient has agreed to participate and understands they can discontinue the visit at any time.    Patient is aware this is a billable service.     History obtained from: patient  History of Present Illness     Currently breastfeeding. +sick contacts at home.    Cough  This is a new problem. The current episode started yesterday. The problem has been unchanged. The problem occurs every few minutes. The cough is Non-productive. Associated symptoms include nasal congestion. Pertinent negatives include no chest pain, chills, ear pain, fever, headaches, myalgias, postnasal drip, rhinorrhea, sore throat, shortness of breath or wheezing. Nothing aggravates the symptoms. She has tried nothing for the symptoms. The treatment provided no relief. There is no history of asthma or COPD.     Review of Systems   Constitutional:  Negative for chills, diaphoresis, fatigue and fever.   HENT:  Positive for congestion. Negative for ear pain, facial swelling, postnasal drip, rhinorrhea, sinus pressure, sinus pain, sore throat, tinnitus and trouble swallowing.    Eyes: Negative.    Respiratory:  Positive for cough. Negative for chest tightness, shortness of breath,  wheezing and stridor.    Cardiovascular:  Negative for chest pain and palpitations.   Gastrointestinal: Negative.    Musculoskeletal:  Negative for arthralgias, back pain, joint swelling, myalgias, neck pain and neck stiffness.   Neurological:  Negative for dizziness, facial asymmetry, weakness, light-headedness, numbness and headaches.       Objective   There were no vitals taken for this visit.    Physical Exam  Constitutional:       General: She is not in acute distress.     Appearance: She is well-developed. She is not diaphoretic.   HENT:      Mouth/Throat:      Tonsils: No tonsillar exudate.   Cardiovascular:      Comments: Unable to assess in this setting  Pulmonary:      Effort: Pulmonary effort is normal.      Comments: Patient able to converse in full sentences, easy non-labored respirations noted. No dyspnea observed.      Lymphadenopathy:      Cervical: No cervical adenopathy.   Skin:     Coloration: Skin is not pale.   Neurological:      Mental Status: She is alert and oriented to person, place, and time.         Visit Time  Total Visit Duration: 15 minutes not including the time spent for establishing the audio/video connection.

## 2025-06-10 ENCOUNTER — RA CDI HCC (OUTPATIENT)
Dept: OTHER | Facility: HOSPITAL | Age: 30
End: 2025-06-10

## 2025-06-17 ENCOUNTER — TELEPHONE (OUTPATIENT)
Dept: FAMILY MEDICINE CLINIC | Facility: CLINIC | Age: 30
End: 2025-06-17

## 2025-06-17 NOTE — TELEPHONE ENCOUNTER
6/17 left message for patient to call back to reschedule physical appointment of 6/24/25 with Dahlia Zabala  Provider not in that day

## 2025-08-19 ENCOUNTER — OFFICE VISIT (OUTPATIENT)
Dept: FAMILY MEDICINE CLINIC | Facility: CLINIC | Age: 30
End: 2025-08-19
Payer: COMMERCIAL

## 2025-08-19 VITALS
WEIGHT: 181.4 LBS | HEART RATE: 82 BPM | DIASTOLIC BLOOD PRESSURE: 72 MMHG | TEMPERATURE: 98 F | BODY MASS INDEX: 30.97 KG/M2 | SYSTOLIC BLOOD PRESSURE: 120 MMHG | HEIGHT: 64 IN | OXYGEN SATURATION: 98 %

## 2025-08-19 DIAGNOSIS — N93.8 DYSFUNCTIONAL UTERINE BLEEDING: Primary | ICD-10-CM

## 2025-08-19 DIAGNOSIS — E78.2 MIXED HYPERLIPIDEMIA: ICD-10-CM

## 2025-08-19 DIAGNOSIS — R39.9 UTI SYMPTOMS: ICD-10-CM

## 2025-08-19 PROBLEM — O09.293 HISTORY OF PRE-ECLAMPSIA IN PRIOR PREGNANCY, CURRENTLY PREGNANT IN THIRD TRIMESTER: Status: RESOLVED | Noted: 2023-10-11 | Resolved: 2025-08-19

## 2025-08-19 LAB
SL AMB  POCT GLUCOSE, UA: NORMAL
SL AMB LEUKOCYTE ESTERASE,UA: NORMAL
SL AMB POCT BILIRUBIN,UA: NORMAL
SL AMB POCT BLOOD,UA: NORMAL
SL AMB POCT CLARITY,UA: CLEAR
SL AMB POCT COLOR,UA: YELLOW
SL AMB POCT KETONES,UA: NORMAL
SL AMB POCT NITRITE,UA: NORMAL
SL AMB POCT PH,UA: 6
SL AMB POCT SPECIFIC GRAVITY,UA: 1.01
SL AMB POCT URINE PROTEIN: NORMAL
SL AMB POCT UROBILINOGEN: 0.2

## 2025-08-19 PROCEDURE — 99214 OFFICE O/P EST MOD 30 MIN: CPT | Performed by: FAMILY MEDICINE

## 2025-08-19 PROCEDURE — 81002 URINALYSIS NONAUTO W/O SCOPE: CPT | Performed by: FAMILY MEDICINE

## 2025-08-19 PROCEDURE — 87086 URINE CULTURE/COLONY COUNT: CPT | Performed by: FAMILY MEDICINE

## 2025-08-21 DIAGNOSIS — N30.00 ACUTE CYSTITIS WITHOUT HEMATURIA: Primary | ICD-10-CM

## 2025-08-21 LAB — BACTERIA UR CULT: ABNORMAL
